# Patient Record
Sex: FEMALE | Race: WHITE | NOT HISPANIC OR LATINO | Employment: FULL TIME | URBAN - METROPOLITAN AREA
[De-identification: names, ages, dates, MRNs, and addresses within clinical notes are randomized per-mention and may not be internally consistent; named-entity substitution may affect disease eponyms.]

---

## 2017-01-13 ENCOUNTER — OFFICE VISIT (OUTPATIENT)
Dept: INTERNAL MEDICINE | Facility: CLINIC | Age: 57
End: 2017-01-13

## 2017-01-13 VITALS
HEIGHT: 65 IN | SYSTOLIC BLOOD PRESSURE: 128 MMHG | HEART RATE: 80 BPM | WEIGHT: 250 LBS | OXYGEN SATURATION: 98 % | DIASTOLIC BLOOD PRESSURE: 68 MMHG | BODY MASS INDEX: 41.65 KG/M2 | RESPIRATION RATE: 16 BRPM | TEMPERATURE: 97.7 F

## 2017-01-13 DIAGNOSIS — Z23 NEED FOR INFLUENZA VACCINATION: ICD-10-CM

## 2017-01-13 DIAGNOSIS — G47.33 OSA (OBSTRUCTIVE SLEEP APNEA): ICD-10-CM

## 2017-01-13 DIAGNOSIS — F32.A DEPRESSION, UNSPECIFIED DEPRESSION TYPE: ICD-10-CM

## 2017-01-13 DIAGNOSIS — Z13.6 SCREENING FOR CARDIOVASCULAR CONDITION: ICD-10-CM

## 2017-01-13 DIAGNOSIS — E78.5 HYPERLIPIDEMIA, UNSPECIFIED HYPERLIPIDEMIA TYPE: ICD-10-CM

## 2017-01-13 DIAGNOSIS — I10 ESSENTIAL HYPERTENSION: Primary | ICD-10-CM

## 2017-01-13 DIAGNOSIS — K76.0 FATTY LIVER DISEASE, NONALCOHOLIC: ICD-10-CM

## 2017-01-13 LAB
CHOLEST SERPL-MCNC: 140 MG/DL (ref 0–200)
HDLC SERPL-MCNC: 44 MG/DL (ref 40–60)
LDLC SERPL CALC-MCNC: 77 MG/DL (ref 0–100)
TRIGL SERPL-MCNC: 97 MG/DL (ref 0–150)
VLDLC SERPL CALC-MCNC: 19.4 MG/DL (ref 5–40)

## 2017-01-13 PROCEDURE — 90658 IIV3 VACCINE SPLT 0.5 ML IM: CPT | Performed by: INTERNAL MEDICINE

## 2017-01-13 PROCEDURE — 99213 OFFICE O/P EST LOW 20 MIN: CPT | Performed by: INTERNAL MEDICINE

## 2017-01-13 PROCEDURE — 90471 IMMUNIZATION ADMIN: CPT | Performed by: INTERNAL MEDICINE

## 2017-01-13 RX ORDER — EZETIMIBE 10 MG/1
10 TABLET ORAL DAILY
COMMUNITY
End: 2018-03-16 | Stop reason: SDUPTHER

## 2017-01-13 NOTE — MR AVS SNAPSHOT
Ann-Marie Eaton   1/13/2017 11:00 AM   Office Visit    Provider:  John Cowan MD   Department:  [facility] INTERNAL MEDICINE   Dept Phone:  [phone]                Your Full Care Plan              Today's Medication Changes          These changes are accurate as of: 1/13/17 11:47 AM.  If you have any questions, ask your nurse or doctor.               Stop taking medication(s)listed here:     dicyclomine 20 MG tablet   Commonly known as:  BENTYL   Stopped by:  John Cowan MD           gabapentin 300 MG capsule   Commonly known as:  NEURONTIN   Stopped by:  John Cowan MD                      Your Updated Medication List          This list is accurate as of: 1/13/17 11:47 AM.  Always use your most recent med list.                atorvastatin 20 MG tablet   Commonly known as:  LIPITOR   TAKE 1 TABLET BY MOUTH DAILY       CARTIA  MG 24 hr capsule   Generic drug:  diltiaZEM CD   TAKE 1 CAPSULE BY MOUTH DAILY       cetirizine 10 MG tablet   Commonly known as:  zyrTEC       lisinopril 10 MG tablet   Commonly known as:  PRINIVIL,ZESTRIL   TAKE 1 TABLET BY MOUTH EVERY DAY       loperamide 2 MG capsule   Commonly known as:  IMODIUM       melatonin 3 MG tablet       raNITIdine 150 MG tablet   Commonly known as:  ZANTAC       sertraline 100 MG tablet   Commonly known as:  ZOLOFT   TAKE 1&1/2 TABLETS BY MOUTH DAILY       valACYclovir 500 MG tablet   Commonly known as:  VALTREX   Take 1 tablet by mouth Daily.       vitamin D 08758 UNITS capsule capsule   Commonly known as:  ERGOCALCIFEROL   TAKE ONE CAPSULE BY MOUTH EVERY WEEK       Vitamin D3 16192 UNITS tablet       ZETIA 10 MG tablet   Generic drug:  ezetimibe               We Performed the Following     Flu Vaccine Greater Than or Equal To 2yo PF     Lipid Panel       You Were Diagnosed With        Codes Comments    Essential hypertension    -  Primary ICD-10-CM: I10  ICD-9-CM: 401.9     Hyperlipidemia, unspecified  hyperlipidemia type     ICD-10-CM: E78.5  ICD-9-CM: 272.4     Fatty liver disease, nonalcoholic     ICD-10-CM: K76.0  ICD-9-CM: 571.8     SHANNAN (obstructive sleep apnea)     ICD-10-CM: G47.33  ICD-9-CM: 327.23     Depression, unspecified depression type     ICD-10-CM: F32.9  ICD-9-CM: 311     Screening for cardiovascular condition     ICD-10-CM: Z13.6  ICD-9-CM: V81.2     Need for influenza vaccination     ICD-10-CM: Z23  ICD-9-CM: V04.81       Instructions     None    Patient Instructions History      [employer] Signup     [facility] [employer]Panel allows you to send messages to your doctor, view your test results, renew your prescriptions, schedule appointments, and more. To sign up, go to [employer] and click on the Sign Up Now link in the New User? box. Enter your MobPanel Activation Code exactly as it appears below along with the last four digits of your Social Security Number and your Date of Birth () to complete the sign-up process. If you do not sign up before the expiration date, you must request a new code.    MobPanel Activation Code: [email or web address]  Expires: 2017 11:46 AM    If you have questions, you can email [email or web address] or call [phone] to talk to our MobPanel staff. Remember, MobPanel is NOT to be used for urgent needs. For medical emergencies, dial 911.               Other Info from Your Visit           Your Appointments     2017  1:10 PM EDT   Follow Up with John Cowan MD   [facility] MEDICAL [facility] INTERNAL MEDICINE (--)    [address] [place] [id number] [phone]           Arrive 15 minutes prior to appointment.              Allergies     Darvon [Propoxyphene]      Other      Animal dander    Pollen Extract      Strawberry Extract  Hives    Sulfa Antibiotics      Penicillins  Rash      Reason for Visit     Hyperlipidemia IBS, GERD    Hypertension           Vital Signs     Blood Pressure Pulse Temperature Respirations Height  "Weight    128/68 (BP Location: Left arm, Patient Position: Sitting, Cuff Size: Large Adult) 80 97.7 °F (36.5 °C) (Oral) 16 65\" (165.1 cm) 250 lb (113 kg)    Oxygen Saturation Body Mass Index Smoking Status             98% 41.6 kg/m2 Former Smoker         Problems and Diagnoses Noted     Depression    Fatty liver disease, nonalcoholic    High cholesterol or triglycerides    High blood pressure    SHANNAN (obstructive sleep apnea)    Screening for cardiovascular condition        Needs flu shot          Immunizations Administered     Name Date    Influenza (IM) Preservative Free       "

## 2017-01-13 NOTE — PROGRESS NOTES
Subjective   Ann-Marie Eaton is a 56 y.o. female.     Chief Complaint   Patient presents with   • Hyperlipidemia     IBS, GERD   • Hypertension         Hyperlipidemia   This is a chronic problem. The current episode started more than 1 year ago. The problem is controlled. Recent lipid tests were reviewed and are normal. Pertinent negatives include no chest pain or shortness of breath. Current antihyperlipidemic treatment includes statins. The current treatment provides significant improvement of lipids. There are no compliance problems.  Risk factors for coronary artery disease include dyslipidemia, obesity, stress, post-menopausal and a sedentary lifestyle.   Hypertension   This is a chronic problem. The current episode started more than 1 year ago. The problem is unchanged. The problem is controlled. Associated symptoms include peripheral edema. Pertinent negatives include no chest pain, headaches, orthopnea, palpitations or shortness of breath. There are no associated agents to hypertension. Risk factors for coronary artery disease include dyslipidemia, post-menopausal state, stress, sedentary lifestyle and smoking/tobacco exposure. Past treatments include ACE inhibitors and calcium channel blockers. The current treatment provides significant improvement. There are no compliance problems.  There is no history of angina, kidney disease, CAD/MI, CVA or heart failure.        The following portions of the patient's history were reviewed and updated as appropriate: allergies, current medications, past social history and problem list.    Outpatient Prescriptions Marked as Taking for the 1/13/17 encounter (Office Visit) with John Cowan MD   Medication Sig Dispense Refill   • atorvastatin (LIPITOR) 20 MG tablet TAKE 1 TABLET BY MOUTH DAILY 90 tablet 0   • CARTIA  MG 24 hr capsule TAKE 1 CAPSULE BY MOUTH DAILY 90 capsule 0   • cetirizine (ZyrTEC) 10 MG tablet Take 10 mg by mouth daily.     • Cholecalciferol  (VITAMIN D3) 82247 UNITS tablet Take 50,000 Units by mouth 1 (one) time per week.     • ezetimibe (ZETIA) 10 MG tablet Take 10 mg by mouth Daily.     • lisinopril (PRINIVIL,ZESTRIL) 10 MG tablet TAKE 1 TABLET BY MOUTH EVERY DAY 90 tablet 0   • loperamide (IMODIUM) 2 MG capsule Take 2 mg by mouth as needed for diarrhea.     • melatonin tablet Take 3 mg by mouth At Night As Needed.     • ranitidine (ZANTAC) 150 MG tablet Take 150 mg by mouth 2 (two) times a day.     • sertraline (ZOLOFT) 100 MG tablet TAKE 1&1/2 TABLETS BY MOUTH DAILY 135 tablet 0   • valACYclovir (VALTREX) 500 MG tablet Take 1 tablet by mouth Daily. 90 tablet 3   • vitamin D (ERGOCALCIFEROL) 09947 UNITS capsule capsule TAKE ONE CAPSULE BY MOUTH EVERY WEEK 13 capsule 0       Review of Systems   Constitutional: Negative for chills, fatigue and fever.   Respiratory: Negative for cough, shortness of breath and wheezing.    Cardiovascular: Negative for chest pain, palpitations, orthopnea and leg swelling.   Gastrointestinal: Positive for diarrhea. Negative for abdominal pain, constipation, nausea and vomiting.   Neurological: Negative for headaches.       Objective   Vitals:    01/13/17 1045   BP: 128/68   Pulse: 80   Resp: 16   Temp: 97.7 °F (36.5 °C)   SpO2: 98%      Last Weight    01/13/17  1045   Weight: 250 lb (113 kg)    [unfilled]  Body mass index is 41.6 kg/(m^2).      Physical Exam   Constitutional: She appears well-developed and well-nourished. No distress.   HENT:   Head: Normocephalic and atraumatic.   Neck: Carotid bruit is not present. No thyromegaly present.   Cardiovascular: Normal rate, regular rhythm, normal heart sounds and intact distal pulses.  Exam reveals no gallop.    No murmur heard.  Pulmonary/Chest: Effort normal and breath sounds normal. No respiratory distress. She has no wheezes. She has no rales.   Abdominal: Soft. Bowel sounds are normal. She exhibits no mass. There is no tenderness. There is no guarding.          Problem List Items Addressed This Visit        Cardiovascular and Mediastinum    Hypertension - Primary    Hyperlipidemia    Relevant Medications    ezetimibe (ZETIA) 10 MG tablet    Other Relevant Orders    Lipid Panel       Respiratory    SHANNAN (obstructive sleep apnea)       Digestive    Fatty liver disease, nonalcoholic       Other    Depression      Other Visit Diagnoses     Screening for cardiovascular condition        Relevant Orders    Vascular Screening (Carotid) CAR        Assessment/Plan   In for recheck of htn, lipids, IBS, and Gerd.  Overall doing well.  Annual labs 4/16.  Lipids and hepatic q 3 mos.  Sertraline worked well at 150 mg daily.  Not seeing therapist at present and she does not feel the need.  Father is terminally ill which is currently the biggest stress in her life.  Father lives in [place].  Due for lipids today.  Flu shot updated today.  She had some x-rays done a dental office and some vascular calcifications were seen in her neck near the right carotid artery.  Further evaluation was suggested.  We'll get an on demand carotid ultrasound.         Dragon disclaimer:   Much of this encounter note is an electronic transcription/translation of spoken language to printed text. The electronic translation of spoken language may permit erroneous, or at times, nonsensical words or phrases to be inadvertently transcribed; Although I have reviewed the note for such errors, some may still exist.

## 2017-02-23 RX ORDER — ERGOCALCIFEROL 1.25 MG/1
CAPSULE ORAL
Qty: 13 CAPSULE | Refills: 0 | Status: SHIPPED | OUTPATIENT
Start: 2017-02-23 | End: 2017-04-18

## 2017-02-23 RX ORDER — SERTRALINE HYDROCHLORIDE 100 MG/1
TABLET, FILM COATED ORAL
Qty: 135 TABLET | Refills: 0 | Status: SHIPPED | OUTPATIENT
Start: 2017-02-23 | End: 2017-05-26 | Stop reason: SDUPTHER

## 2017-02-23 RX ORDER — LISINOPRIL 10 MG/1
TABLET ORAL
Qty: 90 TABLET | Refills: 0 | Status: SHIPPED | OUTPATIENT
Start: 2017-02-23 | End: 2017-05-26 | Stop reason: SDUPTHER

## 2017-02-23 RX ORDER — ATORVASTATIN CALCIUM 20 MG/1
TABLET, FILM COATED ORAL
Qty: 90 TABLET | Refills: 0 | Status: SHIPPED | OUTPATIENT
Start: 2017-02-23 | End: 2017-05-26 | Stop reason: SDUPTHER

## 2017-02-23 RX ORDER — DILTIAZEM HYDROCHLORIDE 240 MG/1
CAPSULE, EXTENDED RELEASE ORAL
Qty: 90 CAPSULE | Refills: 0 | Status: SHIPPED | OUTPATIENT
Start: 2017-02-23 | End: 2017-05-26 | Stop reason: SDUPTHER

## 2017-04-18 ENCOUNTER — OFFICE VISIT (OUTPATIENT)
Dept: INTERNAL MEDICINE | Facility: CLINIC | Age: 57
End: 2017-04-18

## 2017-04-18 VITALS
DIASTOLIC BLOOD PRESSURE: 76 MMHG | WEIGHT: 247.6 LBS | TEMPERATURE: 98.2 F | HEART RATE: 68 BPM | RESPIRATION RATE: 16 BRPM | SYSTOLIC BLOOD PRESSURE: 128 MMHG | HEIGHT: 65 IN | BODY MASS INDEX: 41.25 KG/M2 | OXYGEN SATURATION: 97 %

## 2017-04-18 DIAGNOSIS — E78.5 HYPERLIPIDEMIA, UNSPECIFIED HYPERLIPIDEMIA TYPE: ICD-10-CM

## 2017-04-18 DIAGNOSIS — I10 ESSENTIAL HYPERTENSION: Primary | ICD-10-CM

## 2017-04-18 DIAGNOSIS — F32.A DEPRESSION, UNSPECIFIED DEPRESSION TYPE: ICD-10-CM

## 2017-04-18 DIAGNOSIS — Z11.59 NEED FOR HEPATITIS C SCREENING TEST: ICD-10-CM

## 2017-04-18 DIAGNOSIS — K58.0 IRRITABLE BOWEL SYNDROME WITH DIARRHEA: ICD-10-CM

## 2017-04-18 DIAGNOSIS — K21.9 GASTROESOPHAGEAL REFLUX DISEASE, ESOPHAGITIS PRESENCE NOT SPECIFIED: ICD-10-CM

## 2017-04-18 DIAGNOSIS — Z79.899 MEDICATION MANAGEMENT: ICD-10-CM

## 2017-04-18 PROCEDURE — 99214 OFFICE O/P EST MOD 30 MIN: CPT | Performed by: INTERNAL MEDICINE

## 2017-04-18 RX ORDER — DICYCLOMINE HYDROCHLORIDE 10 MG/1
10 CAPSULE ORAL
Qty: 120 CAPSULE | Refills: 2 | Status: SHIPPED | OUTPATIENT
Start: 2017-04-18 | End: 2017-08-07

## 2017-04-18 NOTE — PROGRESS NOTES
Subjective   Ann-Marie Eaton is a 57 y.o. female.     Chief Complaint   Patient presents with   • Hyperlipidemia   • Hypertension         Hyperlipidemia   This is a chronic problem. The current episode started more than 1 year ago. The problem is controlled. Recent lipid tests were reviewed and are normal. Pertinent negatives include no chest pain or shortness of breath. Current antihyperlipidemic treatment includes statins. The current treatment provides significant improvement of lipids. There are no compliance problems.  Risk factors for coronary artery disease include dyslipidemia, obesity, stress, post-menopausal and a sedentary lifestyle.   Hypertension   This is a chronic problem. The current episode started more than 1 year ago. The problem is unchanged. The problem is controlled. Associated symptoms include peripheral edema. Pertinent negatives include no chest pain, headaches, orthopnea, palpitations or shortness of breath. There are no associated agents to hypertension. Risk factors for coronary artery disease include dyslipidemia, post-menopausal state, stress, sedentary lifestyle and smoking/tobacco exposure. Past treatments include ACE inhibitors and calcium channel blockers. The current treatment provides significant improvement. There are no compliance problems.  There is no history of angina, kidney disease, CAD/MI, CVA or heart failure.   Heartburn   She reports no abdominal pain, no chest pain, no coughing, no nausea or no wheezing. This is a chronic problem. The current episode started more than 1 year ago. The problem occurs occasionally. The problem has been unchanged. Pertinent negatives include no fatigue. She has tried a histamine-2 antagonist for the symptoms. The treatment provided significant relief.        The following portions of the patient's history were reviewed and updated as appropriate: allergies, current medications, past social history and problem list.    Outpatient Prescriptions  Marked as Taking for the 4/18/17 encounter (Office Visit) with John Cowan MD   Medication Sig Dispense Refill   • atorvastatin (LIPITOR) 20 MG tablet TAKE 1 TABLET BY MOUTH DAILY 90 tablet 0   • CARTIA  MG 24 hr capsule TAKE 1 CAPSULE BY MOUTH DAILY 90 capsule 0   • cetirizine (ZyrTEC) 10 MG tablet Take 10 mg by mouth daily.     • Cholecalciferol (VITAMIN D3) 72546 UNITS tablet Take 50,000 Units by mouth 1 (one) time per week.     • ezetimibe (ZETIA) 10 MG tablet Take 10 mg by mouth Daily.     • lisinopril (PRINIVIL,ZESTRIL) 10 MG tablet TAKE 1 TABLET BY MOUTH EVERY DAY 90 tablet 0   • loperamide (IMODIUM) 2 MG capsule Take 2 mg by mouth as needed for diarrhea.     • ranitidine (ZANTAC) 150 MG tablet Take 150 mg by mouth 2 (two) times a day.     • sertraline (ZOLOFT) 100 MG tablet TAKE 1&1/2 TABLETS BY MOUTH DAILY 135 tablet 0   • valACYclovir (VALTREX) 500 MG tablet Take 1 tablet by mouth Daily. 90 tablet 3   • [DISCONTINUED] melatonin tablet Take 3 mg by mouth At Night As Needed.         Review of Systems   Constitutional: Negative for chills, fatigue and fever.   Respiratory: Negative for cough, shortness of breath and wheezing.    Cardiovascular: Negative for chest pain, palpitations, orthopnea and leg swelling.   Gastrointestinal: Positive for diarrhea. Negative for abdominal pain, constipation, nausea and vomiting.   Neurological: Negative for headaches.       Objective   Vitals:    04/18/17 1300   BP: 128/76   Pulse: 68   Resp: 16   Temp: 98.2 °F (36.8 °C)   SpO2: 97%      Last Weight    04/18/17  1300   Weight: 247 lb 9.6 oz (112 kg)    [unfilled]  Body mass index is 41.2 kg/(m^2).      Physical Exam   Constitutional: She appears well-developed and well-nourished. No distress.   HENT:   Head: Normocephalic and atraumatic.   Neck: Carotid bruit is not present. No thyromegaly present.   Cardiovascular: Normal rate, regular rhythm, normal heart sounds and intact distal pulses.  Exam reveals no  gallop.    No murmur heard.  Pulmonary/Chest: Effort normal and breath sounds normal. No respiratory distress. She has no wheezes. She has no rales.   Abdominal: Soft. Bowel sounds are normal. She exhibits no mass. There is no tenderness. There is no guarding.         Problem List Items Addressed This Visit        Cardiovascular and Mediastinum    Hypertension - Primary    Hyperlipidemia    Relevant Orders    Lipid Panel       Digestive    IBS (irritable bowel syndrome)    GERD (gastroesophageal reflux disease)       Other    Depression      Other Visit Diagnoses     Medication management        Relevant Orders    CBC & Differential    Comprehensive Metabolic Panel    Need for hepatitis C screening test        Relevant Orders    Hepatitis C Antibody        Assessment/Plan   In for recheck of htn, lipids, IBS, and Gerd.  Overall doing well.  Diarrhea is worse and has diarrhea predominant irritable bowel.  Takes Imodium when necessary.  May need to start back on dicyclomine.  For now she'll start on a daily probiotic.  Annual labs today 4/17.  Lipids and hepatic q 3 mos.  Sertraline worked well at 150 mg daily.  Not seeing therapist at present and she does not feel the need.  Father has passed away since last visit.  She had some x-rays done a dental office and some vascular calcifications were seen in her neck near the right carotid artery.  Further evaluation was suggested.  We'll get an on demand carotid ultrasound.  Also due for Pap smear and mammogram.         Dragon disclaimer:   Much of this encounter note is an electronic transcription/translation of spoken language to printed text. The electronic translation of spoken language may permit erroneous, or at times, nonsensical words or phrases to be inadvertently transcribed; Although I have reviewed the note for such errors, some may still exist.

## 2017-04-19 ENCOUNTER — RESULTS ENCOUNTER (OUTPATIENT)
Dept: INTERNAL MEDICINE | Facility: CLINIC | Age: 57
End: 2017-04-19

## 2017-04-19 DIAGNOSIS — E78.5 HYPERLIPIDEMIA, UNSPECIFIED HYPERLIPIDEMIA TYPE: ICD-10-CM

## 2017-04-19 DIAGNOSIS — Z79.899 MEDICATION MANAGEMENT: ICD-10-CM

## 2017-04-19 DIAGNOSIS — Z11.59 NEED FOR HEPATITIS C SCREENING TEST: ICD-10-CM

## 2017-05-26 RX ORDER — LISINOPRIL 10 MG/1
TABLET ORAL
Qty: 90 TABLET | Refills: 0 | Status: SHIPPED | OUTPATIENT
Start: 2017-05-26 | End: 2017-08-18 | Stop reason: SDUPTHER

## 2017-05-26 RX ORDER — SERTRALINE HYDROCHLORIDE 100 MG/1
TABLET, FILM COATED ORAL
Qty: 135 TABLET | Refills: 0 | Status: SHIPPED | OUTPATIENT
Start: 2017-05-26 | End: 2017-08-18 | Stop reason: SDUPTHER

## 2017-05-26 RX ORDER — ATORVASTATIN CALCIUM 20 MG/1
TABLET, FILM COATED ORAL
Qty: 90 TABLET | Refills: 0 | Status: SHIPPED | OUTPATIENT
Start: 2017-05-26 | End: 2017-08-18 | Stop reason: SDUPTHER

## 2017-05-26 RX ORDER — ERGOCALCIFEROL 1.25 MG/1
CAPSULE ORAL
Qty: 13 CAPSULE | Refills: 0 | Status: SHIPPED | OUTPATIENT
Start: 2017-05-26 | End: 2017-08-18 | Stop reason: SDUPTHER

## 2017-05-26 RX ORDER — DILTIAZEM HYDROCHLORIDE 240 MG/1
CAPSULE, EXTENDED RELEASE ORAL
Qty: 90 CAPSULE | Refills: 0 | Status: SHIPPED | OUTPATIENT
Start: 2017-05-26 | End: 2017-08-18 | Stop reason: SDUPTHER

## 2017-08-07 ENCOUNTER — OFFICE VISIT (OUTPATIENT)
Dept: INTERNAL MEDICINE | Facility: CLINIC | Age: 57
End: 2017-08-07

## 2017-08-07 VITALS
SYSTOLIC BLOOD PRESSURE: 122 MMHG | HEART RATE: 63 BPM | OXYGEN SATURATION: 96 % | TEMPERATURE: 98.3 F | HEIGHT: 65 IN | WEIGHT: 247.4 LBS | RESPIRATION RATE: 16 BRPM | DIASTOLIC BLOOD PRESSURE: 74 MMHG | BODY MASS INDEX: 41.22 KG/M2

## 2017-08-07 DIAGNOSIS — I10 ESSENTIAL HYPERTENSION: Primary | ICD-10-CM

## 2017-08-07 DIAGNOSIS — E78.5 HYPERLIPIDEMIA, UNSPECIFIED HYPERLIPIDEMIA TYPE: ICD-10-CM

## 2017-08-07 DIAGNOSIS — Z79.899 MEDICATION MANAGEMENT: ICD-10-CM

## 2017-08-07 DIAGNOSIS — Z11.59 NEED FOR HEPATITIS C SCREENING TEST: ICD-10-CM

## 2017-08-07 DIAGNOSIS — G47.33 OSA (OBSTRUCTIVE SLEEP APNEA): ICD-10-CM

## 2017-08-07 DIAGNOSIS — F41.1 ANXIETY, GENERALIZED: ICD-10-CM

## 2017-08-07 DIAGNOSIS — K21.9 GASTROESOPHAGEAL REFLUX DISEASE, ESOPHAGITIS PRESENCE NOT SPECIFIED: ICD-10-CM

## 2017-08-07 LAB
ALBUMIN SERPL-MCNC: 4.2 G/DL (ref 3.5–5.2)
ALP SERPL-CCNC: 115 U/L (ref 39–117)
ALT SERPL-CCNC: 29 U/L (ref 1–33)
AST SERPL-CCNC: 23 U/L (ref 1–32)
BILIRUB DIRECT SERPL-MCNC: <0.2 MG/DL (ref 0–0.3)
BILIRUB SERPL-MCNC: 0.7 MG/DL (ref 0.1–1.2)
CHOLEST SERPL-MCNC: 153 MG/DL (ref 0–200)
HDLC SERPL-MCNC: 49 MG/DL (ref 40–60)
LDLC SERPL CALC-MCNC: 83 MG/DL (ref 0–100)
TRIGL SERPL-MCNC: 106 MG/DL (ref 0–150)
VLDLC SERPL CALC-MCNC: 21.2 MG/DL (ref 5–40)

## 2017-08-07 PROCEDURE — 99214 OFFICE O/P EST MOD 30 MIN: CPT | Performed by: INTERNAL MEDICINE

## 2017-08-07 NOTE — PROGRESS NOTES
Subjective   Ann-Marie Eaton is a 57 y.o. female.     Chief Complaint   Patient presents with   • Hyperlipidemia   • Hypertension         Hyperlipidemia   This is a chronic problem. The current episode started more than 1 year ago. The problem is controlled. Recent lipid tests were reviewed and are normal. Pertinent negatives include no chest pain or shortness of breath. Current antihyperlipidemic treatment includes statins. The current treatment provides significant improvement of lipids. There are no compliance problems.  Risk factors for coronary artery disease include dyslipidemia, obesity, stress, post-menopausal and a sedentary lifestyle.   Hypertension   This is a chronic problem. The current episode started more than 1 year ago. The problem is unchanged. The problem is controlled. Associated symptoms include peripheral edema. Pertinent negatives include no chest pain, headaches, orthopnea, palpitations or shortness of breath. There are no associated agents to hypertension. Risk factors for coronary artery disease include dyslipidemia, post-menopausal state, stress, sedentary lifestyle and smoking/tobacco exposure. Past treatments include ACE inhibitors and calcium channel blockers. The current treatment provides significant improvement. There are no compliance problems.  There is no history of angina, kidney disease, CAD/MI, CVA or heart failure.   Heartburn   She reports no abdominal pain, no chest pain, no coughing, no nausea or no wheezing. This is a chronic problem. The current episode started more than 1 year ago. The problem occurs occasionally. The problem has been unchanged. Pertinent negatives include no fatigue. She has tried a histamine-2 antagonist for the symptoms. The treatment provided significant relief.        The following portions of the patient's history were reviewed and updated as appropriate: allergies, current medications, past social history and problem list.    Outpatient Prescriptions  Marked as Taking for the 8/7/17 encounter (Office Visit) with John Cowan MD   Medication Sig Dispense Refill   • atorvastatin (LIPITOR) 20 MG tablet TAKE 1 TABLET BY MOUTH DAILY 90 tablet 0   • CARTIA  MG 24 hr capsule TAKE 1 CAPSULE BY MOUTH DAILY 90 capsule 0   • cetirizine (ZyrTEC) 10 MG tablet Take 10 mg by mouth daily.     • ezetimibe (ZETIA) 10 MG tablet Take 10 mg by mouth Daily.     • lisinopril (PRINIVIL,ZESTRIL) 10 MG tablet TAKE 1 TABLET BY MOUTH EVERY DAY 90 tablet 0   • loperamide (IMODIUM) 2 MG capsule Take 2 mg by mouth as needed for diarrhea.     • ranitidine (ZANTAC) 150 MG tablet Take 150 mg by mouth 2 (two) times a day.     • sertraline (ZOLOFT) 100 MG tablet TAKE 1&1/2 TABLETS BY MOUTH DAILY 135 tablet 0   • vitamin D (ERGOCALCIFEROL) 49085 UNITS capsule capsule TAKE ONE CAPSULE BY MOUTH EVERY WEEK 13 capsule 0   • [DISCONTINUED] Cholecalciferol (VITAMIN D3) 47984 UNITS tablet Take 50,000 Units by mouth 1 (one) time per week.     • [DISCONTINUED] dicyclomine (BENTYL) 10 MG capsule Take 1 capsule by mouth 4 (Four) Times a Day Before Meals & at Bedtime. 120 capsule 2       Review of Systems   Constitutional: Negative for chills, fatigue and fever.   Respiratory: Negative for cough, shortness of breath and wheezing.    Cardiovascular: Negative for chest pain, palpitations, orthopnea and leg swelling.   Gastrointestinal: Positive for diarrhea. Negative for abdominal pain, constipation, nausea and vomiting.   Neurological: Negative for headaches.       Objective   Vitals:    08/07/17 0850   BP: 122/74   Pulse: 63   Resp: 16   Temp: 98.3 °F (36.8 °C)   SpO2: 96%      Last Weight    08/07/17  0850   Weight: 247 lb 6.4 oz (112 kg)    [unfilled]  Body mass index is 41.17 kg/(m^2).      Physical Exam   Constitutional: She appears well-developed and well-nourished. No distress.   HENT:   Head: Normocephalic and atraumatic.   Neck: Carotid bruit is not present. No thyromegaly present.    Cardiovascular: Normal rate, regular rhythm, normal heart sounds and intact distal pulses.  Exam reveals no gallop.    No murmur heard.  Pulmonary/Chest: Effort normal and breath sounds normal. No respiratory distress. She has no wheezes. She has no rales.   Abdominal: Soft. Bowel sounds are normal. She exhibits no mass. There is no tenderness. There is no guarding.         Problem List Items Addressed This Visit        Cardiovascular and Mediastinum    Hypertension - Primary    Hyperlipidemia       Respiratory    SHANNAN (obstructive sleep apnea)       Digestive    GERD (gastroesophageal reflux disease)       Other    Anxiety, generalized        Assessment/Plan   In for recheck of htn, lipids, IBS, and Gerd.  Overall doing well.  Has diarrhea predominant irritable bowel which is doing pretty well overall.  Takes Imodium when necessary.  May need to start back on dicyclomine.  For now she'll start on a daily probiotic.  Annual labs 4/17.  Lipids and hepatic q 3 mos.  Sertraline worked well at 150 mg daily.  She boosted it to 200 mg per day with recent deaths of father and brother.  Also due for Pap smear and mammogram.         Dragon disclaimer:   Much of this encounter note is an electronic transcription/translation of spoken language to printed text. The electronic translation of spoken language may permit erroneous, or at times, nonsensical words or phrases to be inadvertently transcribed; Although I have reviewed the note for such errors, some may still exist.

## 2017-08-08 LAB — HCV AB S/CO SERPL IA: <0.1 S/CO RATIO (ref 0–0.9)

## 2017-08-18 RX ORDER — SERTRALINE HYDROCHLORIDE 100 MG/1
TABLET, FILM COATED ORAL
Qty: 135 TABLET | Refills: 0 | Status: SHIPPED | OUTPATIENT
Start: 2017-08-18 | End: 2017-11-16 | Stop reason: SDUPTHER

## 2017-08-18 RX ORDER — LISINOPRIL 10 MG/1
TABLET ORAL
Qty: 90 TABLET | Refills: 0 | Status: SHIPPED | OUTPATIENT
Start: 2017-08-18 | End: 2017-11-16 | Stop reason: SDUPTHER

## 2017-08-18 RX ORDER — DILTIAZEM HYDROCHLORIDE 240 MG/1
CAPSULE, EXTENDED RELEASE ORAL
Qty: 90 CAPSULE | Refills: 0 | Status: SHIPPED | OUTPATIENT
Start: 2017-08-18 | End: 2017-11-16 | Stop reason: SDUPTHER

## 2017-08-18 RX ORDER — ERGOCALCIFEROL 1.25 MG/1
CAPSULE ORAL
Qty: 13 CAPSULE | Refills: 0 | Status: SHIPPED | OUTPATIENT
Start: 2017-08-18 | End: 2017-11-16 | Stop reason: SDUPTHER

## 2017-08-18 RX ORDER — ATORVASTATIN CALCIUM 20 MG/1
TABLET, FILM COATED ORAL
Qty: 90 TABLET | Refills: 0 | Status: SHIPPED | OUTPATIENT
Start: 2017-08-18 | End: 2017-11-16 | Stop reason: SDUPTHER

## 2017-08-28 RX ORDER — VALACYCLOVIR HYDROCHLORIDE 500 MG/1
TABLET, FILM COATED ORAL
Qty: 90 TABLET | Refills: 0 | Status: SHIPPED | OUTPATIENT
Start: 2017-08-28 | End: 2017-11-18 | Stop reason: SDUPTHER

## 2017-11-06 ENCOUNTER — HOSPITAL ENCOUNTER (OUTPATIENT)
Dept: CT IMAGING | Facility: HOSPITAL | Age: 57
Discharge: HOME | End: 2017-11-06
Attending: INTERNAL MEDICINE | Admitting: INTERNAL MEDICINE

## 2017-11-06 ENCOUNTER — HOSPITAL ENCOUNTER (OUTPATIENT)
Dept: ULTRASOUND IMAGING | Facility: HOSPITAL | Age: 57
Discharge: HOME | End: 2017-11-06

## 2017-11-06 ENCOUNTER — OFFICE VISIT (OUTPATIENT)
Dept: INTERNAL MEDICINE | Facility: CLINIC | Age: 57
End: 2017-11-06

## 2017-11-06 ENCOUNTER — HOSPITAL ENCOUNTER (OUTPATIENT)
Dept: CT IMAGING | Facility: HOSPITAL | Age: 57
Discharge: HOME | End: 2017-11-06
Attending: INTERNAL MEDICINE

## 2017-11-06 VITALS
BODY MASS INDEX: 42.65 KG/M2 | OXYGEN SATURATION: 96 % | HEART RATE: 71 BPM | WEIGHT: 256 LBS | RESPIRATION RATE: 16 BRPM | HEIGHT: 65 IN | SYSTOLIC BLOOD PRESSURE: 164 MMHG | DIASTOLIC BLOOD PRESSURE: 84 MMHG | TEMPERATURE: 97.7 F

## 2017-11-06 DIAGNOSIS — E78.5 HYPERLIPIDEMIA, UNSPECIFIED HYPERLIPIDEMIA TYPE: ICD-10-CM

## 2017-11-06 DIAGNOSIS — N20.0 KIDNEY STONE: ICD-10-CM

## 2017-11-06 DIAGNOSIS — F32.A DEPRESSION, UNSPECIFIED DEPRESSION TYPE: ICD-10-CM

## 2017-11-06 DIAGNOSIS — N13.30 HYDRONEPHROSIS, RIGHT: Primary | ICD-10-CM

## 2017-11-06 DIAGNOSIS — N20.0 KIDNEY STONE: Primary | ICD-10-CM

## 2017-11-06 DIAGNOSIS — R10.9 ACUTE RIGHT FLANK PAIN: Primary | ICD-10-CM

## 2017-11-06 DIAGNOSIS — Z79.899 MEDICATION MANAGEMENT: ICD-10-CM

## 2017-11-06 DIAGNOSIS — K58.0 IRRITABLE BOWEL SYNDROME WITH DIARRHEA: ICD-10-CM

## 2017-11-06 DIAGNOSIS — N20.0 KIDNEY STONE ON RIGHT SIDE: ICD-10-CM

## 2017-11-06 DIAGNOSIS — I10 ESSENTIAL HYPERTENSION: ICD-10-CM

## 2017-11-06 LAB
ALBUMIN SERPL-MCNC: 4.2 G/DL (ref 3.5–5.2)
ALP SERPL-CCNC: 89 U/L (ref 39–117)
ALT SERPL-CCNC: 24 U/L (ref 1–33)
AST SERPL-CCNC: 25 U/L (ref 1–32)
BILIRUB DIRECT SERPL-MCNC: <0.2 MG/DL (ref 0–0.3)
BILIRUB SERPL-MCNC: 0.7 MG/DL (ref 0.1–1.2)
CHOLEST SERPL-MCNC: 128 MG/DL (ref 0–200)
HDLC SERPL-MCNC: 47 MG/DL (ref 40–60)
LDLC SERPL CALC-MCNC: 64 MG/DL (ref 0–100)
TRIGL SERPL-MCNC: 85 MG/DL (ref 0–150)
VLDLC SERPL CALC-MCNC: 17 MG/DL (ref 5–40)

## 2017-11-06 PROCEDURE — 74176 CT ABD & PELVIS W/O CONTRAST: CPT

## 2017-11-06 PROCEDURE — 99214 OFFICE O/P EST MOD 30 MIN: CPT | Performed by: INTERNAL MEDICINE

## 2017-11-06 PROCEDURE — 76775 US EXAM ABDO BACK WALL LIM: CPT

## 2017-11-06 NOTE — PROGRESS NOTES
Subjective   Ann-Marie Eaton is a 57 y.o. female.     Chief Complaint   Patient presents with   • Hypertension     IBS, DEPRESSION FOLLOW UP   • Hyperlipidemia   • Flank Pain     RIGHT FLANK PAIN SINCE THIS MORNING         Hypertension   This is a chronic problem. The current episode started more than 1 year ago. The problem is unchanged. The problem is controlled. Associated symptoms include peripheral edema. Pertinent negatives include no chest pain, headaches, orthopnea, palpitations or shortness of breath. There are no associated agents to hypertension. Risk factors for coronary artery disease include dyslipidemia, post-menopausal state, stress, sedentary lifestyle and smoking/tobacco exposure. Past treatments include ACE inhibitors and calcium channel blockers. The current treatment provides significant improvement. There are no compliance problems.  There is no history of angina, kidney disease, CAD/MI, CVA or heart failure.   Hyperlipidemia   This is a chronic problem. The current episode started more than 1 year ago. The problem is controlled. Recent lipid tests were reviewed and are normal. Pertinent negatives include no chest pain or shortness of breath. Current antihyperlipidemic treatment includes statins. The current treatment provides significant improvement of lipids. There are no compliance problems.  Risk factors for coronary artery disease include dyslipidemia, obesity, stress, post-menopausal and a sedentary lifestyle.   Flank Pain   This is a new problem. The current episode started today. The problem occurs constantly. The problem is unchanged. The pain is present in the lumbar spine. The quality of the pain is described as stabbing and shooting. The pain does not radiate. The pain is severe. Pertinent negatives include no abdominal pain, chest pain, fever or headaches.   Heartburn   She reports no abdominal pain, no chest pain, no coughing, no nausea or no wheezing. This is a chronic problem. The  current episode started more than 1 year ago. The problem occurs occasionally. The problem has been unchanged. Pertinent negatives include no fatigue. She has tried a histamine-2 antagonist for the symptoms. The treatment provided significant relief.        The following portions of the patient's history were reviewed and updated as appropriate: allergies, current medications, past social history and problem list.    Outpatient Prescriptions Marked as Taking for the 11/6/17 encounter (Office Visit) with John Cowan MD   Medication Sig Dispense Refill   • CARTIA  MG 24 hr capsule TAKE 1 CAPSULE BY MOUTH DAILY 90 capsule 0   • cetirizine (ZyrTEC) 10 MG tablet Take 10 mg by mouth daily.     • ezetimibe (ZETIA) 10 MG tablet Take 10 mg by mouth Daily.     • lisinopril (PRINIVIL,ZESTRIL) 10 MG tablet TAKE 1 TABLET BY MOUTH EVERY DAY 90 tablet 0   • loperamide (IMODIUM) 2 MG capsule Take 2 mg by mouth as needed for diarrhea.     • ranitidine (ZANTAC) 150 MG tablet Take 150 mg by mouth 2 (two) times a day.     • sertraline (ZOLOFT) 100 MG tablet TAKE 1&1/2 TABLETS BY MOUTH DAILY 135 tablet 0   • valACYclovir (VALTREX) 500 MG tablet TAKE 1 TABLET BY MOUTH DAILY 90 tablet 0   • vitamin D (ERGOCALCIFEROL) 95508 units capsule capsule TAKE ONE CAPSULE BY MOUTH EVERY WEEK 13 capsule 0       Review of Systems   Constitutional: Negative for chills, fatigue and fever.   Respiratory: Negative for cough, shortness of breath and wheezing.    Cardiovascular: Negative for chest pain, palpitations, orthopnea and leg swelling.   Gastrointestinal: Positive for diarrhea. Negative for abdominal pain, constipation, nausea and vomiting.   Genitourinary: Positive for flank pain.   Neurological: Negative for headaches.       Objective   Vitals:    11/06/17 1003   BP: 164/84   Pulse: 71   Resp: 16   Temp: 97.7 °F (36.5 °C)   SpO2: 96%      Last Weight    11/06/17  1003   Weight: 256 lb (116 kg)    [unfilled]  Body mass index is 42.6  kg/(m^2).      Physical Exam   Constitutional: She appears well-developed and well-nourished. No distress.   HENT:   Head: Normocephalic and atraumatic.   Neck: Carotid bruit is not present. No thyromegaly present.   Cardiovascular: Normal rate, regular rhythm, normal heart sounds and intact distal pulses.  Exam reveals no gallop.    No murmur heard.  Pulmonary/Chest: Effort normal and breath sounds normal. No respiratory distress. She has no wheezes. She has no rales.   Abdominal: Soft. Bowel sounds are normal. She exhibits no mass. There is no tenderness. There is no guarding.         Problem List Items Addressed This Visit        Cardiovascular and Mediastinum    Hypertension    Hyperlipidemia       Digestive    IBS (irritable bowel syndrome)       Genitourinary    Kidney stone       Other    Depression      Other Visit Diagnoses     Acute right flank pain    -  Primary    Relevant Orders    POC Urinalysis Dipstick, Automated        Assessment/Plan   In for recheck of htn, lipids, IBS, and Gerd.  Began about 1 hour ago with right flank pain.  Is very severe and intense.  No worse with motion.  She's had a total of 5 kidney stones in the past.  This reminds her of a kidney stone.  Has diarrhea predominant irritable bowel which is doing pretty well overall.  Takes Imodium when necessary.  For now she is off of probiotics.  Annual labs 4/17.  Lipids and hepatic q 3 mos.  Sertraline dose is back to 150 mg daily.  Depression is better now.         Dragon disclaimer:   Much of this encounter note is an electronic transcription/translation of spoken language to printed text. The electronic translation of spoken language may permit erroneous, or at times, nonsensical words or phrases to be inadvertently transcribed; Although I have reviewed the note for such errors, some may still exist.

## 2017-11-06 NOTE — NURSING NOTE
Dr. Cowan called and spoke with patient. Patient able to go home. Patient ambulated towards [facility].

## 2017-11-16 RX ORDER — ATORVASTATIN CALCIUM 20 MG/1
TABLET, FILM COATED ORAL
Qty: 90 TABLET | Refills: 0 | Status: SHIPPED | OUTPATIENT
Start: 2017-11-16 | End: 2018-03-16 | Stop reason: SDUPTHER

## 2017-11-16 RX ORDER — SERTRALINE HYDROCHLORIDE 100 MG/1
TABLET, FILM COATED ORAL
Qty: 135 TABLET | Refills: 0 | Status: SHIPPED | OUTPATIENT
Start: 2017-11-16 | End: 2018-03-16 | Stop reason: SDUPTHER

## 2017-11-16 RX ORDER — LISINOPRIL 10 MG/1
TABLET ORAL
Qty: 90 TABLET | Refills: 0 | Status: SHIPPED | OUTPATIENT
Start: 2017-11-16 | End: 2018-03-16 | Stop reason: SDUPTHER

## 2017-11-16 RX ORDER — DILTIAZEM HYDROCHLORIDE 240 MG/1
CAPSULE, EXTENDED RELEASE ORAL
Qty: 90 CAPSULE | Refills: 0 | Status: SHIPPED | OUTPATIENT
Start: 2017-11-16 | End: 2018-03-16 | Stop reason: SDUPTHER

## 2017-11-16 RX ORDER — ERGOCALCIFEROL 1.25 MG/1
CAPSULE ORAL
Qty: 13 CAPSULE | Refills: 0 | Status: SHIPPED | OUTPATIENT
Start: 2017-11-16 | End: 2018-03-16 | Stop reason: SDUPTHER

## 2017-11-20 RX ORDER — VALACYCLOVIR HYDROCHLORIDE 500 MG/1
TABLET, FILM COATED ORAL
Qty: 90 TABLET | Refills: 0 | Status: SHIPPED | OUTPATIENT
Start: 2017-11-20 | End: 2018-02-10 | Stop reason: SDUPTHER

## 2018-01-17 ENCOUNTER — OFFICE VISIT (OUTPATIENT)
Dept: INTERNAL MEDICINE | Facility: CLINIC | Age: 58
End: 2018-01-17

## 2018-01-17 VITALS
OXYGEN SATURATION: 96 % | TEMPERATURE: 98.1 F | WEIGHT: 249.2 LBS | HEART RATE: 78 BPM | DIASTOLIC BLOOD PRESSURE: 78 MMHG | RESPIRATION RATE: 16 BRPM | HEIGHT: 65 IN | BODY MASS INDEX: 41.52 KG/M2 | SYSTOLIC BLOOD PRESSURE: 128 MMHG

## 2018-01-17 DIAGNOSIS — J40 BRONCHITIS: Primary | ICD-10-CM

## 2018-01-17 PROCEDURE — 99213 OFFICE O/P EST LOW 20 MIN: CPT | Performed by: INTERNAL MEDICINE

## 2018-01-17 RX ORDER — DICYCLOMINE HYDROCHLORIDE 10 MG/1
10 CAPSULE ORAL AS NEEDED
COMMUNITY
End: 2019-08-08

## 2018-01-17 RX ORDER — AZITHROMYCIN 250 MG/1
TABLET, FILM COATED ORAL
Qty: 6 TABLET | Refills: 0 | Status: SHIPPED | OUTPATIENT
Start: 2018-01-17 | End: 2018-03-16

## 2018-01-17 NOTE — PROGRESS NOTES
Subjective   Ann-Marie Eaton is a 57 y.o. female.     Chief Complaint   Patient presents with   • Nasal Congestion     X 1 WK   • Cough         Cough   This is a new problem. The current episode started in the past 7 days. The problem has been unchanged. The cough is non-productive. Associated symptoms include rhinorrhea. Pertinent negatives include no chest pain, chills, fever, headaches, myalgias, nasal congestion, postnasal drip or shortness of breath. Nothing aggravates the symptoms. She has tried OTC cough suppressant for the symptoms. The treatment provided no relief.        The following portions of the patient's history were reviewed and updated as appropriate: allergies, current medications, past social history and problem list.    Outpatient Prescriptions Marked as Taking for the 1/17/18 encounter (Office Visit) with John Cowan MD   Medication Sig Dispense Refill   • atorvastatin (LIPITOR) 20 MG tablet TAKE 1 TABLET BY MOUTH DAILY 90 tablet 0   • CARTIA  MG 24 hr capsule TAKE 1 CAPSULE BY MOUTH DAILY 90 capsule 0   • cetirizine (ZyrTEC) 10 MG tablet Take 10 mg by mouth daily.     • dicyclomine (BENTYL) 10 MG capsule Take 10 mg by mouth As Needed.     • ezetimibe (ZETIA) 10 MG tablet Take 10 mg by mouth Daily.     • lisinopril (PRINIVIL,ZESTRIL) 10 MG tablet TAKE 1 TABLET BY MOUTH EVERY DAY 90 tablet 0   • loperamide (IMODIUM) 2 MG capsule Take 2 mg by mouth as needed for diarrhea.     • ranitidine (ZANTAC) 150 MG tablet Take 150 mg by mouth 2 (two) times a day.     • sertraline (ZOLOFT) 100 MG tablet TAKE 1&1/2 TABLETS BY MOUTH DAILY 135 tablet 0   • valACYclovir (VALTREX) 500 MG tablet TAKE 1 TABLET BY MOUTH DAILY 90 tablet 0   • vitamin D (ERGOCALCIFEROL) 93880 units capsule capsule TAKE ONE CAPSULE BY MOUTH EVERY WEEK 13 capsule 0       Review of Systems   Constitutional: Negative for chills and fever.   HENT: Positive for rhinorrhea. Negative for postnasal drip.    Respiratory: Positive for  cough. Negative for shortness of breath.    Cardiovascular: Negative for chest pain.   Musculoskeletal: Negative for myalgias.   Neurological: Negative for headaches.       Objective   Vitals:    01/17/18 0813   BP: 128/78   Pulse: 78   Resp: 16   Temp: 98.1 °F (36.7 °C)   SpO2: 96%      Last Weight    01/17/18 0813   Weight: 113 kg (249 lb 3.2 oz)    [unfilled]  Body mass index is 41.47 kg/(m^2).      Physical Exam   Constitutional: She appears well-developed and well-nourished.   HENT:   Head: Normocephalic and atraumatic.   Right Ear: External ear normal.   Left Ear: External ear normal.   Nose: Nose normal.   Mouth/Throat: Oropharynx is clear and moist.   Eyes: Conjunctivae are normal. Pupils are equal, round, and reactive to light.   Pulmonary/Chest: Effort normal and breath sounds normal. No respiratory distress. She has no wheezes. She has no rales.   Skin: Skin is warm and dry.         Problem List Items Addressed This Visit     None      Visit Diagnoses     Bronchitis    -  Primary        Assessment/Plan   With 1 week of head congestion.  Cough.  No sputum production.  Some ache in the maxillary sinus regions.  OTCs are not helping.  We went through options on treatment today.  She prefers to start Z-Paul which is reasonable.  She'll continue with OTCs.  She's using nasal spray and Mucinex.         Dragon disclaimer:   Much of this encounter note is an electronic transcription/translation of spoken language to printed text. The electronic translation of spoken language may permit erroneous, or at times, nonsensical words or phrases to be inadvertently transcribed; Although I have reviewed the note for such errors, some may still exist.

## 2018-02-09 RX ORDER — ERGOCALCIFEROL 1.25 MG/1
CAPSULE ORAL
Qty: 13 CAPSULE | Refills: 0 | OUTPATIENT
Start: 2018-02-09

## 2018-02-09 RX ORDER — SERTRALINE HYDROCHLORIDE 100 MG/1
TABLET, FILM COATED ORAL
Qty: 135 TABLET | Refills: 0 | OUTPATIENT
Start: 2018-02-09

## 2018-02-09 RX ORDER — LISINOPRIL 10 MG/1
TABLET ORAL
Qty: 90 TABLET | Refills: 0 | OUTPATIENT
Start: 2018-02-09

## 2018-02-09 RX ORDER — DILTIAZEM HYDROCHLORIDE 240 MG/1
CAPSULE, EXTENDED RELEASE ORAL
Qty: 90 CAPSULE | Refills: 0 | OUTPATIENT
Start: 2018-02-09

## 2018-02-09 RX ORDER — ATORVASTATIN CALCIUM 20 MG/1
TABLET, FILM COATED ORAL
Qty: 90 TABLET | Refills: 0 | OUTPATIENT
Start: 2018-02-09

## 2018-02-12 RX ORDER — VALACYCLOVIR HYDROCHLORIDE 500 MG/1
TABLET, FILM COATED ORAL
Qty: 90 TABLET | Refills: 0 | Status: SHIPPED | OUTPATIENT
Start: 2018-02-12 | End: 2018-03-16 | Stop reason: SDUPTHER

## 2018-03-16 ENCOUNTER — OFFICE VISIT (OUTPATIENT)
Dept: INTERNAL MEDICINE | Facility: CLINIC | Age: 58
End: 2018-03-16

## 2018-03-16 VITALS
SYSTOLIC BLOOD PRESSURE: 118 MMHG | OXYGEN SATURATION: 92 % | WEIGHT: 254.4 LBS | HEIGHT: 65 IN | BODY MASS INDEX: 42.38 KG/M2 | HEART RATE: 76 BPM | RESPIRATION RATE: 16 BRPM | TEMPERATURE: 97.8 F | DIASTOLIC BLOOD PRESSURE: 70 MMHG

## 2018-03-16 DIAGNOSIS — E78.5 HYPERLIPIDEMIA, UNSPECIFIED HYPERLIPIDEMIA TYPE: ICD-10-CM

## 2018-03-16 DIAGNOSIS — K58.0 IRRITABLE BOWEL SYNDROME WITH DIARRHEA: ICD-10-CM

## 2018-03-16 DIAGNOSIS — Z79.899 MEDICATION MANAGEMENT: ICD-10-CM

## 2018-03-16 DIAGNOSIS — K21.9 GASTROESOPHAGEAL REFLUX DISEASE, ESOPHAGITIS PRESENCE NOT SPECIFIED: ICD-10-CM

## 2018-03-16 DIAGNOSIS — F41.1 ANXIETY, GENERALIZED: ICD-10-CM

## 2018-03-16 DIAGNOSIS — I10 ESSENTIAL HYPERTENSION: Primary | ICD-10-CM

## 2018-03-16 LAB
ALBUMIN SERPL-MCNC: 4 G/DL (ref 3.5–5.2)
ALP SERPL-CCNC: 112 U/L (ref 39–117)
ALT SERPL-CCNC: 24 U/L (ref 1–33)
AST SERPL-CCNC: 23 U/L (ref 1–32)
BILIRUB DIRECT SERPL-MCNC: <0.2 MG/DL (ref 0–0.3)
BILIRUB SERPL-MCNC: 0.8 MG/DL (ref 0.1–1.2)
CHOLEST SERPL-MCNC: 152 MG/DL (ref 0–200)
HDLC SERPL-MCNC: 42 MG/DL (ref 40–60)
LDLC SERPL CALC-MCNC: 86 MG/DL (ref 0–100)
TRIGL SERPL-MCNC: 121 MG/DL (ref 0–150)
VLDLC SERPL CALC-MCNC: 24.2 MG/DL (ref 5–40)

## 2018-03-16 PROCEDURE — 99214 OFFICE O/P EST MOD 30 MIN: CPT | Performed by: INTERNAL MEDICINE

## 2018-03-16 RX ORDER — RANITIDINE 150 MG/1
150 TABLET ORAL NIGHTLY
Qty: 90 TABLET | Refills: 1 | Status: SHIPPED | OUTPATIENT
Start: 2018-03-16 | End: 2020-01-09 | Stop reason: RX

## 2018-03-16 RX ORDER — SERTRALINE HYDROCHLORIDE 100 MG/1
150 TABLET, FILM COATED ORAL DAILY
Qty: 135 TABLET | Refills: 1 | Status: SHIPPED | OUTPATIENT
Start: 2018-03-16 | End: 2018-09-01 | Stop reason: SDUPTHER

## 2018-03-16 RX ORDER — EZETIMIBE 10 MG/1
10 TABLET ORAL DAILY
Qty: 90 TABLET | Refills: 1 | Status: SHIPPED | OUTPATIENT
Start: 2018-03-16 | End: 2018-09-01 | Stop reason: SDUPTHER

## 2018-03-16 RX ORDER — ATORVASTATIN CALCIUM 20 MG/1
20 TABLET, FILM COATED ORAL DAILY
Qty: 90 TABLET | Refills: 1 | Status: SHIPPED | OUTPATIENT
Start: 2018-03-16 | End: 2018-09-01 | Stop reason: SDUPTHER

## 2018-03-16 RX ORDER — ERGOCALCIFEROL 1.25 MG/1
50000 CAPSULE ORAL WEEKLY
Qty: 13 CAPSULE | Refills: 1 | Status: SHIPPED | OUTPATIENT
Start: 2018-03-16 | End: 2018-09-01 | Stop reason: SDUPTHER

## 2018-03-16 RX ORDER — DILTIAZEM HYDROCHLORIDE 240 MG/1
240 CAPSULE, COATED, EXTENDED RELEASE ORAL DAILY
Qty: 90 CAPSULE | Refills: 1 | Status: SHIPPED | OUTPATIENT
Start: 2018-03-16 | End: 2018-09-01 | Stop reason: SDUPTHER

## 2018-03-16 RX ORDER — LISINOPRIL 10 MG/1
10 TABLET ORAL DAILY
Qty: 90 TABLET | Refills: 1 | Status: SHIPPED | OUTPATIENT
Start: 2018-03-16 | End: 2018-09-01 | Stop reason: SDUPTHER

## 2018-03-16 RX ORDER — VALACYCLOVIR HYDROCHLORIDE 500 MG/1
500 TABLET, FILM COATED ORAL DAILY
Qty: 90 TABLET | Refills: 1 | Status: SHIPPED | OUTPATIENT
Start: 2018-03-16 | End: 2018-12-16 | Stop reason: SDUPTHER

## 2018-03-16 NOTE — PROGRESS NOTES
Subjective   Ann-Marie Eaton is a 58 y.o. female.     Chief Complaint   Patient presents with   • Hypertension   • Hyperlipidemia   • Irritable Bowel Syndrome         Hypertension   This is a chronic problem. The current episode started more than 1 year ago. The problem is unchanged. The problem is controlled. Associated symptoms include peripheral edema. Pertinent negatives include no chest pain, headaches, orthopnea, palpitations or shortness of breath. There are no associated agents to hypertension. Risk factors for coronary artery disease include dyslipidemia, post-menopausal state, stress, sedentary lifestyle and smoking/tobacco exposure. Past treatments include ACE inhibitors and calcium channel blockers. The current treatment provides significant improvement. There are no compliance problems.  There is no history of angina, kidney disease, CAD/MI, CVA or heart failure. Current antihypertension treatment includes ACE inhibitors and calcium channel blockers.   Hyperlipidemia   This is a chronic problem. The current episode started more than 1 year ago. The problem is controlled. Recent lipid tests were reviewed and are normal. Pertinent negatives include no chest pain or shortness of breath. Current antihyperlipidemic treatment includes statins. The current treatment provides significant improvement of lipids. There are no compliance problems.  Risk factors for coronary artery disease include dyslipidemia, obesity, stress, post-menopausal and a sedentary lifestyle.   Irritable Bowel Syndrome   Pertinent negatives include no abdominal pain, chest pain, chills, coughing, fatigue, fever, headaches, nausea or vomiting.   Flank Pain   This is a new problem. The current episode started today. The problem occurs constantly. The problem is unchanged. The pain is present in the lumbar spine. The quality of the pain is described as stabbing and shooting. The pain does not radiate. The pain is severe. Pertinent negatives  include no abdominal pain, chest pain, fever or headaches.   Heartburn   She reports no abdominal pain, no chest pain, no coughing, no nausea or no wheezing. This is a chronic problem. The current episode started more than 1 year ago. The problem occurs occasionally. The problem has been unchanged. Pertinent negatives include no fatigue. She has tried a histamine-2 antagonist for the symptoms. The treatment provided significant relief.        The following portions of the patient's history were reviewed and updated as appropriate: allergies, current medications, past social history and problem list.    Outpatient Prescriptions Marked as Taking for the 3/16/18 encounter (Office Visit) with John Cowan MD   Medication Sig Dispense Refill   • atorvastatin (LIPITOR) 20 MG tablet TAKE 1 TABLET BY MOUTH DAILY 90 tablet 0   • CARTIA  MG 24 hr capsule TAKE 1 CAPSULE BY MOUTH DAILY 90 capsule 0   • cetirizine (ZyrTEC) 10 MG tablet Take 10 mg by mouth daily.     • dicyclomine (BENTYL) 10 MG capsule Take 10 mg by mouth As Needed.     • ezetimibe (ZETIA) 10 MG tablet Take 10 mg by mouth Daily.     • lisinopril (PRINIVIL,ZESTRIL) 10 MG tablet TAKE 1 TABLET BY MOUTH EVERY DAY 90 tablet 0   • loperamide (IMODIUM) 2 MG capsule Take 2 mg by mouth as needed for diarrhea.     • ranitidine (ZANTAC) 150 MG tablet Take 150 mg by mouth 2 (two) times a day.     • sertraline (ZOLOFT) 100 MG tablet TAKE 1&1/2 TABLETS BY MOUTH DAILY 135 tablet 0   • valACYclovir (VALTREX) 500 MG tablet TAKE 1 TABLET BY MOUTH DAILY 90 tablet 0   • vitamin D (ERGOCALCIFEROL) 65693 units capsule capsule TAKE ONE CAPSULE BY MOUTH EVERY WEEK 13 capsule 0       Review of Systems   Constitutional: Negative for chills, fatigue and fever.   Respiratory: Negative for cough, shortness of breath and wheezing.    Cardiovascular: Negative for chest pain, palpitations, orthopnea and leg swelling.   Gastrointestinal: Positive for diarrhea. Negative for abdominal  pain, constipation, nausea and vomiting.   Genitourinary: Positive for flank pain.   Neurological: Negative for headaches.       Objective   Vitals:    03/16/18 1032   BP: 118/70   Pulse: 76   Resp: 16   Temp: 97.8 °F (36.6 °C)   SpO2: 92%      1    03/16/18  1032   Weight: 115 kg (254 lb 6.4 oz)    [unfilled]  Body mass index is 42.33 kg/m².      Physical Exam   Constitutional: She appears well-developed and well-nourished. No distress.   HENT:   Head: Normocephalic and atraumatic.   Neck: Carotid bruit is not present. No thyromegaly present.   Cardiovascular: Normal rate, regular rhythm, normal heart sounds and intact distal pulses.  Exam reveals no gallop.    No murmur heard.  Pulmonary/Chest: Effort normal and breath sounds normal. No respiratory distress. She has no wheezes. She has no rales.   Abdominal: Soft. Bowel sounds are normal. She exhibits no mass. There is no tenderness. There is no guarding.         Problem List Items Addressed This Visit        Cardiovascular and Mediastinum    Hypertension - Primary    Hyperlipidemia       Digestive    IBS (irritable bowel syndrome)    GERD (gastroesophageal reflux disease)       Other    Anxiety, generalized      Other Visit Diagnoses    None.       Assessment/Plan   In for recheck of htn, lipids, IBS, and Gerd.  Has diarrhea predominant irritable bowel which is doing pretty well overall.  Takes Imodium when necessary.  For now she is off of probiotics.  Annual labs 6/18.  Lipids and hepatic q 3 mos.  Sertraline dose is back to 150 mg daily.  Depression is better now.  Due for Shingrix.         Dragon disclaimer:   Much of this encounter note is an electronic transcription/translation of spoken language to printed text. The electronic translation of spoken language may permit erroneous, or at times, nonsensical words or phrases to be inadvertently transcribed; Although I have reviewed the note for such errors, some may still exist.

## 2018-05-27 ENCOUNTER — HOSPITAL ENCOUNTER (INPATIENT)
Facility: HOSPITAL | Age: 58
LOS: 2 days | Discharge: HOME | End: 2018-05-29
Attending: EMERGENCY MEDICINE | Admitting: INTERNAL MEDICINE

## 2018-05-27 ENCOUNTER — APPOINTMENT (OUTPATIENT)
Dept: CT IMAGING | Facility: HOSPITAL | Age: 58
End: 2018-05-27

## 2018-05-27 DIAGNOSIS — K12.2 SUBMANDIBULAR SPACE INFECTION: Primary | ICD-10-CM

## 2018-05-27 DIAGNOSIS — J32.0 LEFT MAXILLARY SINUSITIS: ICD-10-CM

## 2018-05-27 PROBLEM — T85.79XA: Status: ACTIVE | Noted: 2018-05-27

## 2018-05-27 LAB
ALBUMIN SERPL-MCNC: 3.8 G/DL (ref 3.5–5.2)
ALBUMIN/GLOB SERPL: 1.2 G/DL
ALP SERPL-CCNC: 118 U/L (ref 39–117)
ALT SERPL W P-5'-P-CCNC: 16 U/L (ref 1–33)
ANION GAP SERPL CALCULATED.3IONS-SCNC: 9.7 MMOL/L
AST SERPL-CCNC: 12 U/L (ref 1–32)
BASOPHILS # BLD AUTO: 0.03 10*3/MM3 (ref 0–0.2)
BASOPHILS NFR BLD AUTO: 0.3 % (ref 0–1.5)
BILIRUB SERPL-MCNC: 0.8 MG/DL (ref 0.1–1.2)
BUN BLD-MCNC: 12 MG/DL (ref 8–23)
BUN/CREAT SERPL: 20 (ref 7–25)
CALCIUM SPEC-SCNC: 9.2 MG/DL (ref 8.6–10.5)
CHLORIDE SERPL-SCNC: 102 MMOL/L (ref 98–107)
CO2 SERPL-SCNC: 29.3 MMOL/L (ref 22–29)
CREAT BLD-MCNC: 0.6 MG/DL (ref 0.57–1)
DEPRECATED RDW RBC AUTO: 48.5 FL (ref 37–54)
EOSINOPHIL # BLD AUTO: 0.3 10*3/MM3 (ref 0–0.7)
EOSINOPHIL NFR BLD AUTO: 2.8 % (ref 0.3–6.2)
ERYTHROCYTE [DISTWIDTH] IN BLOOD BY AUTOMATED COUNT: 13.4 % (ref 11.7–13)
GFR SERPL CREATININE-BSD FRML MDRD: 102 ML/MIN/1.73
GLOBULIN UR ELPH-MCNC: 3.2 GM/DL
GLUCOSE BLD-MCNC: 105 MG/DL (ref 65–99)
HCT VFR BLD AUTO: 38.1 % (ref 35.6–45.5)
HGB BLD-MCNC: 12.5 G/DL (ref 11.9–15.5)
IMM GRANULOCYTES # BLD: 0.02 10*3/MM3 (ref 0–0.03)
IMM GRANULOCYTES NFR BLD: 0.2 % (ref 0–0.5)
LYMPHOCYTES # BLD AUTO: 1.23 10*3/MM3 (ref 0.9–4.8)
LYMPHOCYTES NFR BLD AUTO: 11.6 % (ref 19.6–45.3)
MCH RBC QN AUTO: 32.5 PG (ref 26.9–32)
MCHC RBC AUTO-ENTMCNC: 32.8 G/DL (ref 32.4–36.3)
MCV RBC AUTO: 99 FL (ref 80.5–98.2)
MONOCYTES # BLD AUTO: 1.16 10*3/MM3 (ref 0.2–1.2)
MONOCYTES NFR BLD AUTO: 11 % (ref 5–12)
NEUTROPHILS # BLD AUTO: 7.84 10*3/MM3 (ref 1.9–8.1)
NEUTROPHILS NFR BLD AUTO: 74.3 % (ref 42.7–76)
PLATELET # BLD AUTO: 271 10*3/MM3 (ref 140–500)
PMV BLD AUTO: 9.9 FL (ref 6–12)
POTASSIUM BLD-SCNC: 4 MMOL/L (ref 3.5–5.2)
PROT SERPL-MCNC: 7 G/DL (ref 6–8.5)
RBC # BLD AUTO: 3.85 10*6/MM3 (ref 3.9–5.2)
SODIUM BLD-SCNC: 141 MMOL/L (ref 136–145)
WBC NRBC COR # BLD: 10.56 10*3/MM3 (ref 4.5–10.7)

## 2018-05-27 PROCEDURE — 99284 EMERGENCY DEPT VISIT MOD MDM: CPT

## 2018-05-27 PROCEDURE — 25010000002 IOPAMIDOL 61 % SOLUTION: Performed by: EMERGENCY MEDICINE

## 2018-05-27 PROCEDURE — 25010000002 PIPERACILLIN SOD-TAZOBACTAM PER 1 G: Performed by: EMERGENCY MEDICINE

## 2018-05-27 PROCEDURE — 25010000002 MORPHINE PER 10 MG: Performed by: EMERGENCY MEDICINE

## 2018-05-27 PROCEDURE — 25010000002 HYDROMORPHONE HCL PF 500 MG/50ML SOLUTION: Performed by: INTERNAL MEDICINE

## 2018-05-27 PROCEDURE — 85025 COMPLETE CBC W/AUTO DIFF WBC: CPT | Performed by: EMERGENCY MEDICINE

## 2018-05-27 PROCEDURE — 25010000002 HEPARIN (PORCINE) PER 1000 UNITS: Performed by: INTERNAL MEDICINE

## 2018-05-27 PROCEDURE — 80053 COMPREHEN METABOLIC PANEL: CPT | Performed by: EMERGENCY MEDICINE

## 2018-05-27 PROCEDURE — 70491 CT SOFT TISSUE NECK W/DYE: CPT

## 2018-05-27 RX ORDER — FAMOTIDINE 40 MG/1
40 TABLET, FILM COATED ORAL DAILY
Status: DISCONTINUED | OUTPATIENT
Start: 2018-05-27 | End: 2018-05-29 | Stop reason: HOSPADM

## 2018-05-27 RX ORDER — NALOXONE HCL 0.4 MG/ML
0.4 VIAL (ML) INJECTION
Status: DISCONTINUED | OUTPATIENT
Start: 2018-05-27 | End: 2018-05-29 | Stop reason: HOSPADM

## 2018-05-27 RX ORDER — HEPARIN SODIUM 5000 [USP'U]/ML
5000 INJECTION, SOLUTION INTRAVENOUS; SUBCUTANEOUS EVERY 12 HOURS SCHEDULED
Status: DISCONTINUED | OUTPATIENT
Start: 2018-05-27 | End: 2018-05-29 | Stop reason: HOSPADM

## 2018-05-27 RX ORDER — DIAZEPAM 10 MG/1
10 TABLET ORAL NIGHTLY PRN
COMMUNITY
End: 2018-07-27

## 2018-05-27 RX ORDER — CLINDAMYCIN HYDROCHLORIDE 300 MG/1
300 CAPSULE ORAL 4 TIMES DAILY
COMMUNITY
End: 2018-05-29 | Stop reason: HOSPADM

## 2018-05-27 RX ORDER — ASPIRIN 81 MG/1
81 TABLET, CHEWABLE ORAL DAILY
Status: DISCONTINUED | OUTPATIENT
Start: 2018-05-27 | End: 2018-05-29 | Stop reason: HOSPADM

## 2018-05-27 RX ORDER — LISINOPRIL 10 MG/1
10 TABLET ORAL DAILY
Status: DISCONTINUED | OUTPATIENT
Start: 2018-05-27 | End: 2018-05-29 | Stop reason: HOSPADM

## 2018-05-27 RX ORDER — DILTIAZEM HYDROCHLORIDE 240 MG/1
240 CAPSULE, COATED, EXTENDED RELEASE ORAL DAILY
Status: DISCONTINUED | OUTPATIENT
Start: 2018-05-27 | End: 2018-05-29 | Stop reason: HOSPADM

## 2018-05-27 RX ORDER — CETIRIZINE HYDROCHLORIDE 10 MG/1
10 TABLET ORAL DAILY
Status: DISCONTINUED | OUTPATIENT
Start: 2018-05-27 | End: 2018-05-29 | Stop reason: HOSPADM

## 2018-05-27 RX ORDER — DIPHENHYDRAMINE HCL 25 MG
25 CAPSULE ORAL EVERY 6 HOURS
Status: COMPLETED | OUTPATIENT
Start: 2018-05-28 | End: 2018-05-28

## 2018-05-27 RX ORDER — ASPIRIN 81 MG/1
81 TABLET, CHEWABLE ORAL DAILY
COMMUNITY
End: 2019-08-08

## 2018-05-27 RX ORDER — HYDROMORPHONE HYDROCHLORIDE 1 MG/ML
0.5 INJECTION, SOLUTION INTRAMUSCULAR; INTRAVENOUS; SUBCUTANEOUS
Status: DISCONTINUED | OUTPATIENT
Start: 2018-05-27 | End: 2018-05-29 | Stop reason: HOSPADM

## 2018-05-27 RX ORDER — SODIUM CHLORIDE 0.9 % (FLUSH) 0.9 %
1-10 SYRINGE (ML) INJECTION AS NEEDED
Status: DISCONTINUED | OUTPATIENT
Start: 2018-05-27 | End: 2018-05-29 | Stop reason: HOSPADM

## 2018-05-27 RX ORDER — LOPERAMIDE HYDROCHLORIDE 2 MG/1
2 CAPSULE ORAL AS NEEDED
Status: DISCONTINUED | OUTPATIENT
Start: 2018-05-27 | End: 2018-05-29 | Stop reason: HOSPADM

## 2018-05-27 RX ORDER — SODIUM CHLORIDE 9 MG/ML
50 INJECTION, SOLUTION INTRAVENOUS CONTINUOUS
Status: DISCONTINUED | OUTPATIENT
Start: 2018-05-27 | End: 2018-05-29 | Stop reason: HOSPADM

## 2018-05-27 RX ORDER — ACETAMINOPHEN 325 MG/1
650 TABLET ORAL EVERY 4 HOURS PRN
Status: DISCONTINUED | OUTPATIENT
Start: 2018-05-27 | End: 2018-05-29 | Stop reason: HOSPADM

## 2018-05-27 RX ORDER — L.ACID,PARA/B.BIFIDUM/S.THERM 8B CELL
1 CAPSULE ORAL DAILY
Status: DISCONTINUED | OUTPATIENT
Start: 2018-05-27 | End: 2018-05-29 | Stop reason: HOSPADM

## 2018-05-27 RX ORDER — MORPHINE SULFATE 2 MG/ML
2 INJECTION, SOLUTION INTRAMUSCULAR; INTRAVENOUS ONCE
Status: COMPLETED | OUTPATIENT
Start: 2018-05-27 | End: 2018-05-27

## 2018-05-27 RX ORDER — ATORVASTATIN CALCIUM 20 MG/1
20 TABLET, FILM COATED ORAL DAILY
Status: DISCONTINUED | OUTPATIENT
Start: 2018-05-27 | End: 2018-05-29 | Stop reason: HOSPADM

## 2018-05-27 RX ORDER — MELATONIN
5000 DAILY
Status: DISCONTINUED | OUTPATIENT
Start: 2018-05-27 | End: 2018-05-29 | Stop reason: HOSPADM

## 2018-05-27 RX ORDER — DIAZEPAM 5 MG/1
10 TABLET ORAL NIGHTLY PRN
Status: DISCONTINUED | OUTPATIENT
Start: 2018-05-27 | End: 2018-05-29 | Stop reason: HOSPADM

## 2018-05-27 RX ORDER — ONDANSETRON 2 MG/ML
4 INJECTION INTRAMUSCULAR; INTRAVENOUS EVERY 6 HOURS PRN
Status: DISCONTINUED | OUTPATIENT
Start: 2018-05-27 | End: 2018-05-29 | Stop reason: HOSPADM

## 2018-05-27 RX ORDER — DEXAMETHASONE SODIUM PHOSPHATE 4 MG/ML
6 VIAL (ML) INJECTION EVERY 6 HOURS
Status: COMPLETED | OUTPATIENT
Start: 2018-05-28 | End: 2018-05-28

## 2018-05-27 RX ORDER — SODIUM CHLORIDE 0.9 % (FLUSH) 0.9 %
10 SYRINGE (ML) INJECTION AS NEEDED
Status: DISCONTINUED | OUTPATIENT
Start: 2018-05-27 | End: 2018-05-29 | Stop reason: HOSPADM

## 2018-05-27 RX ADMIN — IOPAMIDOL 80 ML: 612 INJECTION, SOLUTION INTRAVENOUS at 14:12

## 2018-05-27 RX ADMIN — HYDROMORPHONE HYDROCHLORIDE 0.5 MG: 10 INJECTION INTRAMUSCULAR; INTRAVENOUS; SUBCUTANEOUS at 20:19

## 2018-05-27 RX ADMIN — TAZOBACTAM SODIUM AND PIPERACILLIN SODIUM 4.5 G: 500; 4 INJECTION, SOLUTION INTRAVENOUS at 14:48

## 2018-05-27 RX ADMIN — DIPHENHYDRAMINE HYDROCHLORIDE 25 MG: 25 CAPSULE ORAL at 23:47

## 2018-05-27 RX ADMIN — HYDROMORPHONE HYDROCHLORIDE 0.5 MG: 10 INJECTION INTRAMUSCULAR; INTRAVENOUS; SUBCUTANEOUS at 23:54

## 2018-05-27 RX ADMIN — SODIUM CHLORIDE 100 ML/HR: 9 INJECTION, SOLUTION INTRAVENOUS at 20:25

## 2018-05-27 RX ADMIN — HEPARIN SODIUM 5000 UNITS: 5000 INJECTION, SOLUTION INTRAVENOUS; SUBCUTANEOUS at 23:47

## 2018-05-27 RX ADMIN — MORPHINE SULFATE 2 MG: 2 INJECTION, SOLUTION INTRAMUSCULAR; INTRAVENOUS at 13:55

## 2018-05-28 ENCOUNTER — APPOINTMENT (OUTPATIENT)
Dept: GENERAL RADIOLOGY | Facility: HOSPITAL | Age: 58
End: 2018-05-28

## 2018-05-28 PROBLEM — D72.829 LEUKOCYTOSIS: Status: ACTIVE | Noted: 2018-05-28

## 2018-05-28 LAB
ALBUMIN SERPL-MCNC: 3.9 G/DL (ref 3.5–5.2)
ALBUMIN/GLOB SERPL: 1.1 G/DL
ALP SERPL-CCNC: 123 U/L (ref 39–117)
ALT SERPL W P-5'-P-CCNC: 14 U/L (ref 1–33)
ANION GAP SERPL CALCULATED.3IONS-SCNC: 11.5 MMOL/L
AST SERPL-CCNC: 12 U/L (ref 1–32)
BASOPHILS # BLD AUTO: 0.01 10*3/MM3 (ref 0–0.2)
BASOPHILS NFR BLD AUTO: 0.1 % (ref 0–1.5)
BILIRUB SERPL-MCNC: 0.9 MG/DL (ref 0.1–1.2)
BUN BLD-MCNC: 10 MG/DL (ref 8–23)
BUN/CREAT SERPL: 15.9 (ref 7–25)
CALCIUM SPEC-SCNC: 9.3 MG/DL (ref 8.6–10.5)
CHLORIDE SERPL-SCNC: 99 MMOL/L (ref 98–107)
CO2 SERPL-SCNC: 26.5 MMOL/L (ref 22–29)
CREAT BLD-MCNC: 0.63 MG/DL (ref 0.57–1)
DEPRECATED RDW RBC AUTO: 49.4 FL (ref 37–54)
EOSINOPHIL # BLD AUTO: 0.06 10*3/MM3 (ref 0–0.7)
EOSINOPHIL NFR BLD AUTO: 0.5 % (ref 0.3–6.2)
ERYTHROCYTE [DISTWIDTH] IN BLOOD BY AUTOMATED COUNT: 13.6 % (ref 11.7–13)
GFR SERPL CREATININE-BSD FRML MDRD: 96 ML/MIN/1.73
GLOBULIN UR ELPH-MCNC: 3.5 GM/DL
GLUCOSE BLD-MCNC: 143 MG/DL (ref 65–99)
HCT VFR BLD AUTO: 39 % (ref 35.6–45.5)
HGB BLD-MCNC: 12.8 G/DL (ref 11.9–15.5)
IMM GRANULOCYTES # BLD: 0.02 10*3/MM3 (ref 0–0.03)
IMM GRANULOCYTES NFR BLD: 0.2 % (ref 0–0.5)
LYMPHOCYTES # BLD AUTO: 0.74 10*3/MM3 (ref 0.9–4.8)
LYMPHOCYTES NFR BLD AUTO: 6 % (ref 19.6–45.3)
MCH RBC QN AUTO: 32.7 PG (ref 26.9–32)
MCHC RBC AUTO-ENTMCNC: 32.8 G/DL (ref 32.4–36.3)
MCV RBC AUTO: 99.5 FL (ref 80.5–98.2)
MONOCYTES # BLD AUTO: 0.18 10*3/MM3 (ref 0.2–1.2)
MONOCYTES NFR BLD AUTO: 1.5 % (ref 5–12)
NEUTROPHILS # BLD AUTO: 11.29 10*3/MM3 (ref 1.9–8.1)
NEUTROPHILS NFR BLD AUTO: 91.9 % (ref 42.7–76)
PLATELET # BLD AUTO: 277 10*3/MM3 (ref 140–500)
PMV BLD AUTO: 10.3 FL (ref 6–12)
POTASSIUM BLD-SCNC: 4.1 MMOL/L (ref 3.5–5.2)
PROT SERPL-MCNC: 7.4 G/DL (ref 6–8.5)
RBC # BLD AUTO: 3.92 10*6/MM3 (ref 3.9–5.2)
SODIUM BLD-SCNC: 137 MMOL/L (ref 136–145)
WBC NRBC COR # BLD: 12.28 10*3/MM3 (ref 4.5–10.7)

## 2018-05-28 PROCEDURE — 70355 PANORAMIC X-RAY OF JAWS: CPT

## 2018-05-28 PROCEDURE — 85025 COMPLETE CBC W/AUTO DIFF WBC: CPT | Performed by: INTERNAL MEDICINE

## 2018-05-28 PROCEDURE — 25010000002 DEXAMETHASONE PER 1 MG: Performed by: INTERNAL MEDICINE

## 2018-05-28 PROCEDURE — 80053 COMPREHEN METABOLIC PANEL: CPT | Performed by: INTERNAL MEDICINE

## 2018-05-28 PROCEDURE — 25010000002 HEPARIN (PORCINE) PER 1000 UNITS: Performed by: INTERNAL MEDICINE

## 2018-05-28 PROCEDURE — 25010000003 AMPICILLIN-SULBACTAM PER 1.5 G: Performed by: INTERNAL MEDICINE

## 2018-05-28 PROCEDURE — 63710000001 DIPHENHYDRAMINE PER 50 MG: Performed by: INTERNAL MEDICINE

## 2018-05-28 RX ADMIN — DIPHENHYDRAMINE HYDROCHLORIDE 25 MG: 25 CAPSULE ORAL at 12:21

## 2018-05-28 RX ADMIN — SODIUM CHLORIDE 3 G: 900 INJECTION INTRAVENOUS at 19:11

## 2018-05-28 RX ADMIN — LOPERAMIDE HYDROCHLORIDE 2 MG: 2 CAPSULE ORAL at 20:43

## 2018-05-28 RX ADMIN — HEPARIN SODIUM 5000 UNITS: 5000 INJECTION, SOLUTION INTRAVENOUS; SUBCUTANEOUS at 08:25

## 2018-05-28 RX ADMIN — SODIUM CHLORIDE 50 ML/HR: 9 INJECTION, SOLUTION INTRAVENOUS at 10:01

## 2018-05-28 RX ADMIN — DEXAMETHASONE SODIUM PHOSPHATE 6 MG: 4 INJECTION, SOLUTION INTRAMUSCULAR; INTRAVENOUS at 19:12

## 2018-05-28 RX ADMIN — DEXAMETHASONE SODIUM PHOSPHATE 6 MG: 4 INJECTION, SOLUTION INTRAMUSCULAR; INTRAVENOUS at 07:08

## 2018-05-28 RX ADMIN — DIPHENHYDRAMINE HYDROCHLORIDE 25 MG: 25 CAPSULE ORAL at 19:12

## 2018-05-28 RX ADMIN — DEXAMETHASONE SODIUM PHOSPHATE 6 MG: 4 INJECTION, SOLUTION INTRAMUSCULAR; INTRAVENOUS at 12:21

## 2018-05-28 RX ADMIN — SODIUM CHLORIDE 3 G: 900 INJECTION INTRAVENOUS at 01:20

## 2018-05-28 RX ADMIN — SODIUM CHLORIDE 3 G: 900 INJECTION INTRAVENOUS at 07:08

## 2018-05-28 RX ADMIN — DEXAMETHASONE SODIUM PHOSPHATE 6 MG: 4 INJECTION, SOLUTION INTRAMUSCULAR; INTRAVENOUS at 01:19

## 2018-05-28 RX ADMIN — SODIUM CHLORIDE 3 G: 900 INJECTION INTRAVENOUS at 12:22

## 2018-05-28 RX ADMIN — DIPHENHYDRAMINE HYDROCHLORIDE 25 MG: 25 CAPSULE ORAL at 07:08

## 2018-05-29 VITALS
RESPIRATION RATE: 16 BRPM | BODY MASS INDEX: 43.59 KG/M2 | HEART RATE: 82 BPM | WEIGHT: 246 LBS | SYSTOLIC BLOOD PRESSURE: 121 MMHG | HEIGHT: 63 IN | TEMPERATURE: 97.8 F | DIASTOLIC BLOOD PRESSURE: 67 MMHG | OXYGEN SATURATION: 92 %

## 2018-05-29 LAB
ANION GAP SERPL CALCULATED.3IONS-SCNC: 13.2 MMOL/L
BUN BLD-MCNC: 11 MG/DL (ref 8–23)
BUN/CREAT SERPL: 19.6 (ref 7–25)
CALCIUM SPEC-SCNC: 9.5 MG/DL (ref 8.6–10.5)
CHLORIDE SERPL-SCNC: 103 MMOL/L (ref 98–107)
CO2 SERPL-SCNC: 25.8 MMOL/L (ref 22–29)
CREAT BLD-MCNC: 0.56 MG/DL (ref 0.57–1)
DEPRECATED RDW RBC AUTO: 46.6 FL (ref 37–54)
ERYTHROCYTE [DISTWIDTH] IN BLOOD BY AUTOMATED COUNT: 12.9 % (ref 11.7–13)
GFR SERPL CREATININE-BSD FRML MDRD: 110 ML/MIN/1.73
GLUCOSE BLD-MCNC: 148 MG/DL (ref 65–99)
HCT VFR BLD AUTO: 38.1 % (ref 35.6–45.5)
HGB BLD-MCNC: 12.3 G/DL (ref 11.9–15.5)
MCH RBC QN AUTO: 32.1 PG (ref 26.9–32)
MCHC RBC AUTO-ENTMCNC: 32.3 G/DL (ref 32.4–36.3)
MCV RBC AUTO: 99.5 FL (ref 80.5–98.2)
PLATELET # BLD AUTO: 325 10*3/MM3 (ref 140–500)
PMV BLD AUTO: 10.5 FL (ref 6–12)
POTASSIUM BLD-SCNC: 3.5 MMOL/L (ref 3.5–5.2)
RBC # BLD AUTO: 3.83 10*6/MM3 (ref 3.9–5.2)
SODIUM BLD-SCNC: 142 MMOL/L (ref 136–145)
WBC NRBC COR # BLD: 13.18 10*3/MM3 (ref 4.5–10.7)

## 2018-05-29 PROCEDURE — 25010000003 AMPICILLIN-SULBACTAM PER 1.5 G: Performed by: INTERNAL MEDICINE

## 2018-05-29 PROCEDURE — 80048 BASIC METABOLIC PNL TOTAL CA: CPT | Performed by: HOSPITALIST

## 2018-05-29 PROCEDURE — 99239 HOSP IP/OBS DSCHRG MGMT >30: CPT | Performed by: INTERNAL MEDICINE

## 2018-05-29 PROCEDURE — 85027 COMPLETE CBC AUTOMATED: CPT | Performed by: HOSPITALIST

## 2018-05-29 RX ORDER — AMOXICILLIN AND CLAVULANATE POTASSIUM 875; 125 MG/1; MG/1
1 TABLET, FILM COATED ORAL EVERY 12 HOURS SCHEDULED
Qty: 14 TABLET | Refills: 0 | Status: SHIPPED | OUTPATIENT
Start: 2018-05-29 | End: 2018-06-15

## 2018-05-29 RX ADMIN — SODIUM CHLORIDE 3 G: 900 INJECTION INTRAVENOUS at 00:18

## 2018-05-29 RX ADMIN — SODIUM CHLORIDE 3 G: 900 INJECTION INTRAVENOUS at 06:16

## 2018-05-29 RX ADMIN — ACETAMINOPHEN 650 MG: 325 TABLET, FILM COATED ORAL at 03:49

## 2018-06-04 ENCOUNTER — OFFICE VISIT (OUTPATIENT)
Dept: INTERNAL MEDICINE | Facility: CLINIC | Age: 58
End: 2018-06-04

## 2018-06-04 ENCOUNTER — TRANSCRIBE ORDERS (OUTPATIENT)
Dept: ADMINISTRATIVE | Facility: HOSPITAL | Age: 58
End: 2018-06-04

## 2018-06-04 VITALS
HEART RATE: 89 BPM | OXYGEN SATURATION: 95 % | RESPIRATION RATE: 16 BRPM | TEMPERATURE: 98.5 F | HEIGHT: 63 IN | DIASTOLIC BLOOD PRESSURE: 80 MMHG | SYSTOLIC BLOOD PRESSURE: 118 MMHG | WEIGHT: 238.6 LBS | BODY MASS INDEX: 42.28 KG/M2

## 2018-06-04 DIAGNOSIS — T85.79XA: Primary | ICD-10-CM

## 2018-06-04 DIAGNOSIS — Z13.9 VISIT FOR SCREENING: Primary | ICD-10-CM

## 2018-06-04 PROCEDURE — 99213 OFFICE O/P EST LOW 20 MIN: CPT | Performed by: INTERNAL MEDICINE

## 2018-06-04 NOTE — PROGRESS NOTES
Subjective   Ann-Marie Eaton is a 58 y.o. female.     Chief Complaint   Patient presents with   • Facial Swelling     x 1 wkER 5/27/18, dental implant 4/27/18 5 on top, 4 on bottom, removed 1 bottom implant x1.5wk agoi         Facial Swelling   This is a new problem. The current episode started 1 to 4 weeks ago. The problem occurs constantly. The problem has been waxing and waning. Associated symptoms include nausea. Pertinent negatives include no chills, fever, sore throat or vomiting. Nothing aggravates the symptoms. The treatment provided moderate relief.        The following portions of the patient's history were reviewed and updated as appropriate: allergies, current medications, past social history and problem list.    Outpatient Prescriptions Marked as Taking for the 6/4/18 encounter (Office Visit) with John Cowan MD   Medication Sig Dispense Refill   • amoxicillin-clavulanate (AUGMENTIN) 875-125 MG per tablet Take 1 tablet by mouth Every 12 (Twelve) Hours. 14 tablet 0   • aspirin 81 MG chewable tablet Chew 81 mg Daily.     • atorvastatin (LIPITOR) 20 MG tablet Take 1 tablet by mouth Daily. 90 tablet 1   • cetirizine (ZyrTEC) 10 MG tablet Take 10 mg by mouth daily.     • diazePAM (VALIUM) 10 MG tablet Take 10 mg by mouth At Night As Needed for Anxiety (for grinding teeth). Patient taking half a tablet qHS for grinding teeth since dental procedure April 27th, 2018.     • dicyclomine (BENTYL) 10 MG capsule Take 10 mg by mouth As Needed.     • diltiaZEM CD (CARTIA XT) 240 MG 24 hr capsule Take 1 capsule by mouth Daily. 90 capsule 1   • ezetimibe (ZETIA) 10 MG tablet Take 1 tablet by mouth Daily. 90 tablet 1   • lisinopril (PRINIVIL,ZESTRIL) 10 MG tablet Take 1 tablet by mouth Daily. 90 tablet 1   • loperamide (IMODIUM) 2 MG capsule Take 2 mg by mouth as needed for diarrhea.     • Probiotic Product (PROBIOTIC DAILY PO) Take  by mouth. Taking while on antibiotic therapy.     • raNITIdine (ZANTAC) 150 MG tablet  Take 1 tablet by mouth Every Night. 90 tablet 1   • sertraline (ZOLOFT) 100 MG tablet Take 1.5 tablets by mouth Daily. 135 tablet 1   • valACYclovir (VALTREX) 500 MG tablet Take 1 tablet by mouth Daily. 90 tablet 1   • vitamin D (ERGOCALCIFEROL) 14257 units capsule capsule Take 1 capsule by mouth 1 (One) Time Per Week. 13 capsule 1       Review of Systems   Constitutional: Negative for chills and fever.   HENT: Positive for facial swelling. Negative for sore throat.    Gastrointestinal: Positive for nausea. Negative for vomiting.       Objective   Vitals:    06/04/18 0927   BP: 118/80   Pulse: 89   Resp: 16   Temp: 98.5 °F (36.9 °C)   SpO2: 95%      1    06/04/18 0927   Weight: 108 kg (238 lb 9.6 oz)    [unfilled]  Body mass index is 42.28 kg/m².      Physical Exam   Constitutional: She appears well-developed and well-nourished.   Neck: Normal range of motion. Neck supple. No tracheal deviation present. No thyromegaly present.   Lymphadenopathy:     She has no cervical adenopathy.         Problem List Items Addressed This Visit        Other    Dental will-implantitis - Primary        Assessment/Plan   In today for recheck from hospitalization with submandibular abscess related to infection around her dental implant.  She been on antibiotics for about 3 weeks prior to coming into the hospital.  Was given IV Avelox for a few days and went home 6 days ago and had an implant removal.  Since then she's doing very well.  Her examination is fantastic today.  I feel very little pathology in the neck.  She is having diarrhea about 3 times a day but is loose, not watery.  On a probiotic.  On Augmentin 875 twice a day.  We'll going to stop her antibiotics today.  Call if she has any worsening in her neck symptoms.  She also plans to get her hep A shots.       Current outpatient and discharge medications have been reconciled for the patient.  Reviewed by: John Cowan MD    Patient's Body mass index is 42.28 kg/m². BMI  is above normal parameters. Recommendations include: educational material and exercise counseling.    Dragon disclaimer:   Much of this encounter note is an electronic transcription/translation of spoken language to printed text. The electronic translation of spoken language may permit erroneous, or at times, nonsensical words or phrases to be inadvertently transcribed; Although I have reviewed the note for such errors, some may still exist.

## 2018-06-14 ENCOUNTER — TELEPHONE (OUTPATIENT)
Dept: INTERNAL MEDICINE | Facility: CLINIC | Age: 58
End: 2018-06-14

## 2018-06-14 RX ORDER — AMOXICILLIN AND CLAVULANATE POTASSIUM 875; 125 MG/1; MG/1
1 TABLET, FILM COATED ORAL EVERY 12 HOURS SCHEDULED
Qty: 14 TABLET | Refills: 0 | Status: SHIPPED | OUTPATIENT
Start: 2018-06-14 | End: 2018-07-27

## 2018-06-14 NOTE — TELEPHONE ENCOUNTER
Patient was in the office 6-4-18 with submandibular abcess related to infection around dental implant. Per your office note, if patient has any worsening symptoms in her neck to call the office. This morning she started with pain and swollen lymph nodes in the neck again. Within the last hour it has worsened. Please advise.

## 2018-06-14 NOTE — TELEPHONE ENCOUNTER
Per Dr. Cowan e-scribe Augmentin and have patient come in for appointment tomorrow.    E-scribed medication & scheduled appt for tomorrow.

## 2018-06-15 ENCOUNTER — OFFICE VISIT (OUTPATIENT)
Dept: INTERNAL MEDICINE | Facility: CLINIC | Age: 58
End: 2018-06-15

## 2018-06-15 VITALS
DIASTOLIC BLOOD PRESSURE: 62 MMHG | RESPIRATION RATE: 16 BRPM | BODY MASS INDEX: 43.77 KG/M2 | SYSTOLIC BLOOD PRESSURE: 122 MMHG | HEART RATE: 68 BPM | OXYGEN SATURATION: 97 % | HEIGHT: 63 IN | TEMPERATURE: 98.1 F | WEIGHT: 247 LBS

## 2018-06-15 DIAGNOSIS — T85.79XA: Primary | ICD-10-CM

## 2018-06-15 PROCEDURE — 99213 OFFICE O/P EST LOW 20 MIN: CPT | Performed by: INTERNAL MEDICINE

## 2018-06-15 NOTE — PROGRESS NOTES
Subjective   Ann-Marie Eaton is a 58 y.o. female.     Chief Complaint   Patient presents with   • Neck Pain     TOOTH ABCESS x 1 day         Neck Pain    This is a new problem. The current episode started in the past 7 days. The problem occurs constantly. The problem has been gradually worsening. The pain is associated with nothing. Pertinent negatives include no fever, headaches or pain with swallowing. The treatment provided no relief.        The following portions of the patient's history were reviewed and updated as appropriate: allergies, current medications, past social history and problem list.    Outpatient Prescriptions Marked as Taking for the 6/15/18 encounter (Office Visit) with John Cowan MD   Medication Sig Dispense Refill   • amoxicillin-clavulanate (AUGMENTIN) 875-125 MG per tablet Take 1 tablet by mouth Every 12 (Twelve) Hours. 14 tablet 0   • aspirin 81 MG chewable tablet Chew 81 mg Daily.     • atorvastatin (LIPITOR) 20 MG tablet Take 1 tablet by mouth Daily. 90 tablet 1   • cetirizine (ZyrTEC) 10 MG tablet Take 10 mg by mouth daily.     • diazePAM (VALIUM) 10 MG tablet Take 10 mg by mouth At Night As Needed for Anxiety (for grinding teeth). Patient taking half a tablet qHS for grinding teeth since dental procedure April 27th, 2018.     • dicyclomine (BENTYL) 10 MG capsule Take 10 mg by mouth As Needed.     • diltiaZEM CD (CARTIA XT) 240 MG 24 hr capsule Take 1 capsule by mouth Daily. 90 capsule 1   • ezetimibe (ZETIA) 10 MG tablet Take 1 tablet by mouth Daily. 90 tablet 1   • lisinopril (PRINIVIL,ZESTRIL) 10 MG tablet Take 1 tablet by mouth Daily. 90 tablet 1   • loperamide (IMODIUM) 2 MG capsule Take 2 mg by mouth as needed for diarrhea.     • Probiotic Product (PROBIOTIC DAILY PO) Take  by mouth. Taking while on antibiotic therapy.     • raNITIdine (ZANTAC) 150 MG tablet Take 1 tablet by mouth Every Night. 90 tablet 1   • sertraline (ZOLOFT) 100 MG tablet Take 1.5 tablets by mouth Daily. 135  tablet 1   • valACYclovir (VALTREX) 500 MG tablet Take 1 tablet by mouth Daily. 90 tablet 1   • vitamin D (ERGOCALCIFEROL) 22159 units capsule capsule Take 1 capsule by mouth 1 (One) Time Per Week. 13 capsule 1       Review of Systems   Constitutional: Negative for chills and fever.   HENT: Positive for dental problem. Negative for drooling, postnasal drip, rhinorrhea, sinus pressure and sore throat.    Musculoskeletal: Positive for neck pain.   Neurological: Negative for headaches.       Objective   Vitals:    06/15/18 0811   BP: 122/62   Pulse: 68   Resp: 16   Temp: 98.1 °F (36.7 °C)   SpO2: 97%      1    06/15/18  0811   Weight: 112 kg (247 lb)    [unfilled]  Body mass index is 43.77 kg/m².      Physical Exam   Constitutional: She appears well-developed and well-nourished.   Neck: Normal range of motion. Neck supple. No tracheal deviation present. No thyromegaly present.   Lymphadenopathy:        Head (left side): Submental adenopathy present.         Problem List Items Addressed This Visit        Other    Dental will-implantitis - Primary    Relevant Orders    CT soft tissue neck w contrast        Assessment/Plan   In with swelling in the left submandibular region.  Seems be getting worse.  Symptoms for 3-4 days.  Began on Augmentin last night.  She was just discharged from [facility] about 3 weeks ago with a dental abscess that extended into the left neck region causing a phlegmon in that area.  She was treated with IV antibiotics, Zosyn, and discharged on Augmentin for another 3-5 days.  She did had a removal of the implant.  She does have some firmness right under the angle of the jaw.  We'll continue Augmentin for now.  Recheck a CT scan of the soft tissues of the neck with contrast.  Mammogram is scheduled for next week.  Due for Shingrix follow-up shot.           .[email or web address]  Dragon disclaimer:   Much of this encounter note is an electronic transcription/translation of spoken language to printed  text. The electronic translation of spoken language may permit erroneous, or at times, nonsensical words or phrases to be inadvertently transcribed; Although I have reviewed the note for such errors, some may still exist.

## 2018-06-16 ENCOUNTER — HOSPITAL ENCOUNTER (OUTPATIENT)
Dept: CT IMAGING | Facility: HOSPITAL | Age: 58
Discharge: HOME | End: 2018-06-16
Attending: INTERNAL MEDICINE | Admitting: INTERNAL MEDICINE

## 2018-06-16 DIAGNOSIS — T85.79XA: ICD-10-CM

## 2018-06-16 LAB — CREAT BLDA-MCNC: 0.5 MG/DL (ref 0.6–1.3)

## 2018-06-16 PROCEDURE — 25010000002 IOPAMIDOL 61 % SOLUTION: Performed by: INTERNAL MEDICINE

## 2018-06-16 PROCEDURE — 82565 ASSAY OF CREATININE: CPT

## 2018-06-16 PROCEDURE — 70491 CT SOFT TISSUE NECK W/DYE: CPT

## 2018-06-16 RX ADMIN — IOPAMIDOL 85 ML: 612 INJECTION, SOLUTION INTRAVENOUS at 09:58

## 2018-06-19 ENCOUNTER — APPOINTMENT (OUTPATIENT)
Dept: MAMMOGRAPHY | Facility: HOSPITAL | Age: 58
End: 2018-06-19
Attending: INTERNAL MEDICINE

## 2018-07-03 ENCOUNTER — APPOINTMENT (OUTPATIENT)
Dept: MAMMOGRAPHY | Facility: HOSPITAL | Age: 58
End: 2018-07-03
Attending: INTERNAL MEDICINE

## 2018-07-27 ENCOUNTER — LAB (OUTPATIENT)
Dept: LAB | Facility: HOSPITAL | Age: 58
End: 2018-07-27

## 2018-07-27 ENCOUNTER — OFFICE VISIT (OUTPATIENT)
Dept: INTERNAL MEDICINE | Facility: CLINIC | Age: 58
End: 2018-07-27

## 2018-07-27 VITALS
WEIGHT: 240.6 LBS | SYSTOLIC BLOOD PRESSURE: 130 MMHG | HEART RATE: 81 BPM | TEMPERATURE: 98.6 F | BODY MASS INDEX: 42.63 KG/M2 | HEIGHT: 63 IN | OXYGEN SATURATION: 97 % | DIASTOLIC BLOOD PRESSURE: 80 MMHG | RESPIRATION RATE: 16 BRPM

## 2018-07-27 DIAGNOSIS — I10 ESSENTIAL HYPERTENSION: ICD-10-CM

## 2018-07-27 DIAGNOSIS — E78.5 HYPERLIPIDEMIA, UNSPECIFIED HYPERLIPIDEMIA TYPE: ICD-10-CM

## 2018-07-27 DIAGNOSIS — K58.0 IRRITABLE BOWEL SYNDROME WITH DIARRHEA: ICD-10-CM

## 2018-07-27 DIAGNOSIS — K76.0 FATTY LIVER DISEASE, NONALCOHOLIC: ICD-10-CM

## 2018-07-27 DIAGNOSIS — K21.9 GASTROESOPHAGEAL REFLUX DISEASE, ESOPHAGITIS PRESENCE NOT SPECIFIED: ICD-10-CM

## 2018-07-27 DIAGNOSIS — Z00.00 ENCOUNTER FOR PREVENTIVE HEALTH EXAMINATION: Primary | ICD-10-CM

## 2018-07-27 DIAGNOSIS — E78.5 HYPERLIPIDEMIA, UNSPECIFIED HYPERLIPIDEMIA TYPE: Primary | ICD-10-CM

## 2018-07-27 PROBLEM — K12.2 SUBMANDIBULAR SPACE INFECTION: Status: RESOLVED | Noted: 2018-05-27 | Resolved: 2018-07-27

## 2018-07-27 LAB
ALBUMIN SERPL-MCNC: 3.9 G/DL (ref 3.5–5.2)
ALP SERPL-CCNC: 93 U/L (ref 39–117)
ALT SERPL W P-5'-P-CCNC: 17 U/L (ref 1–33)
AST SERPL-CCNC: 14 U/L (ref 1–32)
BILIRUB CONJ SERPL-MCNC: <0.2 MG/DL (ref 0–0.3)
BILIRUB INDIRECT SERPL-MCNC: NORMAL MG/DL
BILIRUB SERPL-MCNC: 0.5 MG/DL (ref 0.1–1.2)
CHOLEST SERPL-MCNC: 121 MG/DL (ref 0–200)
HDLC SERPL-MCNC: 42 MG/DL (ref 40–60)
LDLC SERPL CALC-MCNC: 66 MG/DL (ref 0–100)
LDLC/HDLC SERPL: 1.56 {RATIO}
PROT SERPL-MCNC: 6.5 G/DL (ref 6–8.5)
TRIGL SERPL-MCNC: 67 MG/DL (ref 0–150)
VLDLC SERPL-MCNC: 13.4 MG/DL (ref 5–40)

## 2018-07-27 PROCEDURE — 99396 PREV VISIT EST AGE 40-64: CPT | Performed by: INTERNAL MEDICINE

## 2018-07-27 PROCEDURE — 36415 COLL VENOUS BLD VENIPUNCTURE: CPT

## 2018-07-27 PROCEDURE — 80061 LIPID PANEL: CPT | Performed by: INTERNAL MEDICINE

## 2018-07-27 PROCEDURE — 80076 HEPATIC FUNCTION PANEL: CPT

## 2018-07-27 NOTE — PATIENT INSTRUCTIONS
Exercising to Stay Healthy  Exercising regularly is important. It has many health benefits, such as:  · Improving your overall fitness, flexibility, and endurance.  · Increasing your bone density.  · Helping with weight control.  · Decreasing your body fat.  · Increasing your muscle strength.  · Reducing stress and tension.  · Improving your overall health.    In order to become healthy and stay healthy, it is recommended that you do moderate-intensity and vigorous-intensity exercise. You can tell that you are exercising at a moderate intensity if you have a higher heart rate and faster breathing, but you are still able to hold a conversation. You can tell that you are exercising at a vigorous intensity if you are breathing much harder and faster and cannot hold a conversation while exercising.  How often should I exercise?  Choose an activity that you enjoy and set realistic goals. Your health care provider can help you to make an activity plan that works for you. Exercise regularly as directed by your health care provider. This may include:  · Doing resistance training twice each week, such as:  ? Push-ups.  ? Sit-ups.  ? Lifting weights.  ? Using resistance bands.  · Doing a given intensity of exercise for a given amount of time. Choose from these options:  ? 150 minutes of moderate-intensity exercise every week.  ? 75 minutes of vigorous-intensity exercise every week.  ? A mix of moderate-intensity and vigorous-intensity exercise every week.    Children, pregnant women, people who are out of shape, people who are overweight, and older adults may need to consult a health care provider for individual recommendations. If you have any sort of medical condition, be sure to consult your health care provider before starting a new exercise program.  What are some exercise ideas?  Some moderate-intensity exercise ideas include:  · Walking at a rate of 1 mile in 15  minutes.  · Biking.  · Hiking.  · Golfing.  · Dancing.    Some vigorous-intensity exercise ideas include:  · Walking at a rate of at least 4.5 miles per hour.  · Jogging or running at a rate of 5 miles per hour.  · Biking at a rate of at least 10 miles per hour.  · Lap swimming.  · Roller-skating or in-line skating.  · Cross-country skiing.  · Vigorous competitive sports, such as football, basketball, and soccer.  · Jumping rope.  · Aerobic dancing.    What are some everyday activities that can help me to get exercise?  · Yard work, such as:  ? Pushing a .  ? Raking and bagging leaves.  · Washing and waxing your car.  · Pushing a stroller.  · Shoveling snow.  · Gardening.  · Washing windows or floors.  How can I be more active in my day-to-day activities?  · Use the stairs instead of the elevator.  · Take a walk during your lunch break.  · If you drive, park your car farther away from work or school.  · If you take public transportation, get off one stop early and walk the rest of the way.  · Make all of your phone calls while standing up and walking around.  · Get up, stretch, and walk around every 30 minutes throughout the day.  What guidelines should I follow while exercising?  · Do not exercise so much that you hurt yourself, feel dizzy, or get very short of breath.  · Consult your health care provider before starting a new exercise program.  · Wear comfortable clothes and shoes with good support.  · Drink plenty of water while you exercise to prevent dehydration or heat stroke. Body water is lost during exercise and must be replaced.  · Work out until you breathe faster and your heart beats faster.  This information is not intended to replace advice given to you by your health care provider. Make sure you discuss any questions you have with your health care provider.  Document Released: 01/20/2012 Document Revised: 05/25/2017 Document Reviewed: 05/21/2015  [employer] Interactive Patient Education © 2018  [employer]  Calorie Counting for Weight Loss  Calories are units of energy. Your body needs a certain amount of calories from food to keep you going throughout the day. When you eat more calories than your body needs, your body stores the extra calories as fat. When you eat fewer calories than your body needs, your body burns fat to get the energy it needs.  Calorie counting means keeping track of how many calories you eat and drink each day. Calorie counting can be helpful if you need to lose weight. If you make sure to eat fewer calories than your body needs, you should lose weight. Ask your health care provider what a healthy weight is for you.  For calorie counting to work, you will need to eat the right number of calories in a day in order to lose a healthy amount of weight per week. A dietitian can help you determine how many calories you need in a day and will give you suggestions on how to reach your calorie goal.  · A healthy amount of weight to lose per week is usually 1-2 lb (0.5-0.9 kg). This usually means that your daily calorie intake should be reduced by 500-750 calories.  · Eating 1,200 - 1,500 calories per day can help most women lose weight.  · Eating 1,500 - 1,800 calories per day can help most men lose weight.    What do I need to know about calorie counting?  In order to meet your daily calorie goal, you will need to:  · Find out how many calories are in each food you would like to eat. Try to do this before you eat.  · Decide how much of the food you plan to eat.  · Write down what you ate and how many calories it had. Doing this is called keeping a food log.    To successfully lose weight, it is important to balance calorie counting with a healthy lifestyle that includes regular activity. Aim for 150 minutes of moderate exercise (such as walking) or 75 minutes of vigorous exercise (such as running) each week.  Where do I find calorie information?    The number of calories in a food can be  found on a Nutrition Facts label. If a food does not have a Nutrition Facts label, try to look up the calories online or ask your dietitian for help.  Remember that calories are listed per serving. If you choose to have more than one serving of a food, you will have to multiply the calories per serving by the amount of servings you plan to eat. For example, the label on a package of bread might say that a serving size is 1 slice and that there are 90 calories in a serving. If you eat 1 slice, you will have eaten 90 calories. If you eat 2 slices, you will have eaten 180 calories.  How do I keep a food log?  Immediately after each meal, record the following information in your food log:  · What you ate. Don't forget to include toppings, sauces, and other extras on the food.  · How much you ate. This can be measured in cups, ounces, or number of items.  · How many calories each food and drink had.  · The total number of calories in the meal.    Keep your food log near you, such as in a small notebook in your pocket, or use a mobile krystal or website. Some programs will calculate calories for you and show you how many calories you have left for the day to meet your goal.  What are some calorie counting tips?  · Use your calories on foods and drinks that will fill you up and not leave you hungry:  ? Some examples of foods that fill you up are nuts and nut butters, vegetables, lean proteins, and high-fiber foods like whole grains. High-fiber foods are foods with more than 5 g fiber per serving.  ? Drinks such as sodas, specialty coffee drinks, alcohol, and juices have a lot of calories, yet do not fill you up.  · Eat nutritious foods and avoid empty calories. Empty calories are calories you get from foods or beverages that do not have many vitamins or protein, such as candy, sweets, and soda. It is better to have a nutritious high-calorie food (such as an avocado) than a food with few nutrients (such as a bag of  "chips).  · Know how many calories are in the foods you eat most often. This will help you calculate calorie counts faster.  · Pay attention to calories in drinks. Low-calorie drinks include water and unsweetened drinks.  · Pay attention to nutrition labels for \"low fat\" or \"fat free\" foods. These foods sometimes have the same amount of calories or more calories than the full fat versions. They also often have added sugar, starch, or salt, to make up for flavor that was removed with the fat.  · Find a way of tracking calories that works for you. Get creative. Try different apps or programs if writing down calories does not work for you.  What are some portion control tips?  · Know how many calories are in a serving. This will help you know how many servings of a certain food you can have.  · Use a measuring cup to measure serving sizes. You could also try weighing out portions on a kitchen scale. With time, you will be able to estimate serving sizes for some foods.  · Take some time to put servings of different foods on your favorite plates, bowls, and cups so you know what a serving looks like.  · Try not to eat straight from a bag or box. Doing this can lead to overeating. Put the amount you would like to eat in a cup or on a plate to make sure you are eating the right portion.  · Use smaller plates, glasses, and bowls to prevent overeating.  · Try not to multitask (for example, watch TV or use your computer) while eating. If it is time to eat, sit down at a table and enjoy your food. This will help you to know when you are full. It will also help you to be aware of what you are eating and how much you are eating.  What are tips for following this plan?  Reading food labels  · Check the calorie count compared to the serving size. The serving size may be smaller than what you are used to eating.  · Check the source of the calories. Make sure the food you are eating is high in vitamins and protein and low in " saturated and trans fats.  Shopping  · Read nutrition labels while you shop. This will help you make healthy decisions before you decide to purchase your food.  · Make a grocery list and stick to it.  Cooking  · Try to cook your favorite foods in a healthier way. For example, try baking instead of frying.  · Use low-fat dairy products.  Meal planning  · Use more fruits and vegetables. Half of your plate should be fruits and vegetables.  · Include lean proteins like poultry and fish.  How do I count calories when eating out?  · Ask for smaller portion sizes.  · Consider sharing an entree and sides instead of getting your own entree.  · If you get your own entree, eat only half. Ask for a box at the beginning of your meal and put the rest of your entree in it so you are not tempted to eat it.  · If calories are listed on the menu, choose the lower calorie options.  · Choose dishes that include vegetables, fruits, whole grains, low-fat dairy products, and lean protein.  · Choose items that are boiled, broiled, grilled, or steamed. Stay away from items that are buttered, battered, fried, or served with cream sauce. Items labeled “crispy” are usually fried, unless stated otherwise.  · Choose water, low-fat milk, unsweetened iced tea, or other drinks without added sugar. If you want an alcoholic beverage, choose a lower calorie option such as a glass of wine or light beer.  · Ask for dressings, sauces, and syrups on the side. These are usually high in calories, so you should limit the amount you eat.  · If you want a salad, choose a garden salad and ask for grilled meats. Avoid extra toppings like felder, cheese, or fried items. Ask for the dressing on the side, or ask for olive oil and vinegar or lemon to use as dressing.  · Estimate how many servings of a food you are given. For example, a serving of cooked rice is ½ cup or about the size of half a baseball. Knowing serving sizes will help you be aware of how much food  you are eating at restaurants. The list below tells you how big or small some common portion sizes are based on everyday objects:  ? 1 oz--4 stacked dice.  ? 3 oz--1 deck of cards.  ? 1 tsp--1 die.  ? 1 Tbsp--½ a ping-pong ball.  ? 2 Tbsp--1 ping-pong ball.  ? ½ cup--½ baseball.  ? 1 cup--1 baseball.  Summary  · Calorie counting means keeping track of how many calories you eat and drink each day. If you eat fewer calories than your body needs, you should lose weight.  · A healthy amount of weight to lose per week is usually 1-2 lb (0.5-0.9 kg). This usually means reducing your daily calorie intake by 500-750 calories.  · The number of calories in a food can be found on a Nutrition Facts label. If a food does not have a Nutrition Facts label, try to look up the calories online or ask your dietitian for help.  · Use your calories on foods and drinks that will fill you up, and not on foods and drinks that will leave you hungry.  · Use smaller plates, glasses, and bowls to prevent overeating.  This information is not intended to replace advice given to you by your health care provider. Make sure you discuss any questions you have with your health care provider.  Document Released: 12/18/2006 Document Revised: 11/17/2017 Document Reviewed: 11/17/2017  [employer] Interactive Patient Education © 2018 [employer]  BMI for Adults  Body mass index (BMI) is a number that is calculated from a person's weight and height. In most adults, the number is used to find how much of an adult's weight is made up of fat. BMI is not as accurate as a direct measure of body fat.  How is BMI calculated?  BMI is calculated by dividing weight in kilograms by height in meters squared. It can also be calculated by dividing weight in pounds by height in inches squared, then multiplying the resulting number by 703. Charts are available to help you find your BMI quickly and easily without doing this calculation.  How is BMI interpreted?  Health care  professionals use BMI charts to identify whether an adult is underweight, at a normal weight, or overweight based on the following guidelines:  · Underweight: BMI less than 18.5.  · Normal weight: BMI between 18.5 and 24.9.  · Overweight: BMI between 25 and 29.9.  · Obese: BMI of 30 and above.    BMI is usually interpreted the same for males and females.  Weight includes both fat and muscle, so someone with a muscular build, such as an athlete, may have a BMI that is higher than 24.9. In cases like these, BMI may not accurately depict body fat. To determine if excess body fat is the cause of a BMI of 25 or higher, further assessments may need to be done by a health care provider.  Why is BMI a useful tool?  BMI is used to identify a possible weight problem that may be related to a medical problem or may increase the risk for medical problems. BMI can also be used to promote changes to reach a healthy weight.  This information is not intended to replace advice given to you by your health care provider. Make sure you discuss any questions you have with your health care provider.  Document Released: 08/29/2005 Document Revised: 04/27/2017 Document Reviewed: 05/15/2015  [employer] Interactive Patient Education © 2018 [employer]

## 2018-07-27 NOTE — PROGRESS NOTES
Subjective   Ann-Marie Eaton is a 58 y.o. female.     Chief Complaint   Patient presents with   • Annual Exam     mammo scheduled 8/8/18         In for annual preventative exam.  Sleeps 6-7 hours per night.  Exercises about one day per week.  Energy is good.  Diet is so-so.  Still on a soft diet.         The following portions of the patient's history were reviewed and updated as appropriate: allergies, current medications, past social history and problem list.    HISTORY  Outpatient Prescriptions Marked as Taking for the 7/27/18 encounter (Office Visit) with John Cowan MD   Medication Sig Dispense Refill   • aspirin 81 MG chewable tablet Chew 81 mg Daily.     • atorvastatin (LIPITOR) 20 MG tablet Take 1 tablet by mouth Daily. 90 tablet 1   • diltiaZEM CD (CARTIA XT) 240 MG 24 hr capsule Take 1 capsule by mouth Daily. 90 capsule 1   • ezetimibe (ZETIA) 10 MG tablet Take 1 tablet by mouth Daily. 90 tablet 1   • lisinopril (PRINIVIL,ZESTRIL) 10 MG tablet Take 1 tablet by mouth Daily. 90 tablet 1   • loperamide (IMODIUM) 2 MG capsule Take 2 mg by mouth as needed for diarrhea.     • Naproxen (NAPROSYN PO) Take 220 mg by mouth Daily.     • Probiotic Product (PROBIOTIC DAILY PO) Take 1 tablet by mouth Daily. Taking while on antibiotic therapy.      • raNITIdine (ZANTAC) 150 MG tablet Take 1 tablet by mouth Every Night. 90 tablet 1   • sertraline (ZOLOFT) 100 MG tablet Take 1.5 tablets by mouth Daily. (Patient taking differently: Take 100 mg by mouth Daily.) 135 tablet 1   • valACYclovir (VALTREX) 500 MG tablet Take 1 tablet by mouth Daily. 90 tablet 1   • vitamin D (ERGOCALCIFEROL) 41160 units capsule capsule Take 1 capsule by mouth 1 (One) Time Per Week. 13 capsule 1     Social History     Social History   • Marital status:      Spouse name: N/A   • Number of children: N/A   • Years of education: N/A     Occupational History   • Not on file.     Social History Main Topics   • Smoking status: Former Smoker      Packs/day: 1.00     Years: 15.00     Types: Cigarettes     Quit date: 2006   • Smokeless tobacco: Never Used   • Alcohol use Yes      Comment: occasionaly    • Drug use: No   • Sexual activity: Defer     Other Topics Concern   • Not on file     Social History Narrative   • No narrative on file     Family History   Problem Relation Age of Onset   • Lung cancer Mother    • Aneurysm Father    • COPD Father    • No Known Problems Brother    • No Known Problems Daughter    • No Known Problems Sister      Past Medical History:   Diagnosis Date   • Anxiety    • DDD (degenerative disc disease), lumbar    • Fatty liver disease, nonalcoholic    • GERD (gastroesophageal reflux disease)    • HLD (hyperlipidemia)    • HTN (hypertension)    • IBS (irritable bowel syndrome)    • SHANNAN (obstructive sleep apnea)    • Osteoarthritis of right knee    • Urolithiasis    • Vitamin D deficiency      Past Surgical History:   Procedure Laterality Date   • APPENDECTOMY     • CHOLECYSTECTOMY     • CYSTOSCOPY W/ LASER LITHOTRIPSY  04/2014   • [place] TOTAL KNEE ARTHROPLASTY Left 4/18/2016    Procedure: LT TOTAL KNEE ARTHROPLASTY;  Surgeon: Cam Ryan MD;  Location: [facility];  Service: Orthopedics   • TONSILLECTOMY     • TOTAL KNEE ARTHROPLASTY     • URETEROTOMY  03/2014    R Stent- stone       Review of Systems   Constitutional: Negative for appetite change, chills, fatigue and fever.   HENT: Negative for congestion, ear pain, hearing loss, nosebleeds, postnasal drip, rhinorrhea, sinus pressure and trouble swallowing.    Eyes: Negative for pain, itching and visual disturbance.   Respiratory: Negative for cough, chest tightness, shortness of breath and wheezing.    Cardiovascular: Negative for chest pain, palpitations and leg swelling.   Gastrointestinal: Positive for abdominal pain (GERD), constipation and diarrhea. Negative for anal bleeding, nausea, rectal pain and vomiting.   Endocrine: Negative for cold intolerance, heat intolerance  and polyuria.   Genitourinary: Negative for difficulty urinating, dysuria, flank pain, frequency, hematuria and urgency.   Musculoskeletal: Positive for arthralgias (right knee). Negative for back pain and myalgias.   Skin: Negative for rash.   Allergic/Immunologic: Positive for environmental allergies.   Neurological: Negative for dizziness, syncope, speech difficulty, weakness, light-headedness, numbness and headaches.   Hematological: Bruises/bleeds easily.   Psychiatric/Behavioral: Positive for dysphoric mood. Negative for agitation, confusion and sleep disturbance. The patient is not nervous/anxious.        Objective   Vitals:    07/27/18 0919   BP: 130/80   Pulse: 81   Resp: 16   Temp: 98.6 °F (37 °C)   SpO2: 97%      1    07/27/18 0919   Weight: 109 kg (240 lb 9.6 oz)    [unfilled]  Body mass index is 42.63 kg/m².      Physical Exam   Constitutional: She is oriented to person, place, and time. She appears well-developed and well-nourished.   HENT:   Head: Normocephalic and atraumatic.   Right Ear: External ear normal.   Left Ear: External ear normal.   Nose: Nose normal.   Mouth/Throat: Oropharynx is clear and moist.   Eyes: Pupils are equal, round, and reactive to light. Conjunctivae and EOM are normal.   Neck: Normal range of motion. Neck supple. No JVD present. No thyromegaly present.   Cardiovascular: Normal rate, regular rhythm, normal heart sounds and intact distal pulses.  Exam reveals no gallop.    No murmur heard.  Pulmonary/Chest: Effort normal and breath sounds normal. No respiratory distress. She has no wheezes. She has no rales.   Abdominal: Soft. Bowel sounds are normal. She exhibits no distension and no mass. There is no tenderness. There is no guarding. No hernia.   Musculoskeletal: Normal range of motion. She exhibits no edema.   Lymphadenopathy:     She has no cervical adenopathy.   Neurological: She is alert and oriented to person, place, and time. She displays normal reflexes. No  cranial nerve deficit. Coordination normal.   Skin: Skin is warm and dry.   Psychiatric: She has a normal mood and affect. Her behavior is normal. Judgment and thought content normal.   Nursing note and vitals reviewed.        Problem List Items Addressed This Visit        Cardiovascular and Mediastinum    Hypertension    Hyperlipidemia       Digestive    IBS (irritable bowel syndrome)    GERD (gastroesophageal reflux disease)      Other Visit Diagnoses     Encounter for preventive health examination    -  Primary        Assessment/Plan   In for annual preventative exam and recheck of htn, lipids, IBS, and Gerd.  Has diarrhea predominant irritable bowel which is doing pretty well overall.  Takes Imodium when necessary.  For now she is improved on probiotics.  Annual labs 5/18.  Lipids and hepatic q 3 mos.  Sertraline dose is back to 150 mg daily.  Depression is better now.  Best I have seen her in several years.  Due for Shingrix.  Mammogram is scheduled in about 2 weeks.         Dragon disclaimer:   Much of this encounter note is an electronic transcription/translation of spoken language to printed text. The electronic translation of spoken language may permit erroneous, or at times, nonsensical words or phrases to be inadvertently transcribed; Although I have reviewed the note for such errors, some may still exist.

## 2018-08-15 ENCOUNTER — HOSPITAL ENCOUNTER (OUTPATIENT)
Dept: MAMMOGRAPHY | Facility: HOSPITAL | Age: 58
Discharge: HOME | End: 2018-08-15
Attending: INTERNAL MEDICINE | Admitting: INTERNAL MEDICINE

## 2018-08-15 DIAGNOSIS — Z13.9 VISIT FOR SCREENING: ICD-10-CM

## 2018-08-15 PROCEDURE — 77063 BREAST TOMOSYNTHESIS BI: CPT

## 2018-08-15 PROCEDURE — 77067 SCR MAMMO BI INCL CAD: CPT

## 2018-09-04 RX ORDER — SERTRALINE HYDROCHLORIDE 100 MG/1
TABLET, FILM COATED ORAL
Qty: 135 TABLET | Refills: 0 | Status: SHIPPED | OUTPATIENT
Start: 2018-09-04 | End: 2018-11-29 | Stop reason: SDUPTHER

## 2018-09-04 RX ORDER — ERGOCALCIFEROL 1.25 MG/1
CAPSULE ORAL
Qty: 13 CAPSULE | Refills: 0 | Status: SHIPPED | OUTPATIENT
Start: 2018-09-04 | End: 2018-11-29 | Stop reason: SDUPTHER

## 2018-09-04 RX ORDER — LISINOPRIL 10 MG/1
10 TABLET ORAL DAILY
Qty: 90 TABLET | Refills: 0 | Status: SHIPPED | OUTPATIENT
Start: 2018-09-04 | End: 2018-11-29 | Stop reason: SDUPTHER

## 2018-09-04 RX ORDER — EZETIMIBE 10 MG/1
10 TABLET ORAL DAILY
Qty: 90 TABLET | Refills: 0 | Status: SHIPPED | OUTPATIENT
Start: 2018-09-04 | End: 2018-11-29 | Stop reason: SDUPTHER

## 2018-09-04 RX ORDER — DILTIAZEM HYDROCHLORIDE 240 MG/1
240 CAPSULE, EXTENDED RELEASE ORAL DAILY
Qty: 90 CAPSULE | Refills: 0 | Status: SHIPPED | OUTPATIENT
Start: 2018-09-04 | End: 2018-11-29 | Stop reason: SDUPTHER

## 2018-09-04 RX ORDER — ATORVASTATIN CALCIUM 20 MG/1
20 TABLET, FILM COATED ORAL DAILY
Qty: 90 TABLET | Refills: 0 | Status: SHIPPED | OUTPATIENT
Start: 2018-09-04 | End: 2018-11-29 | Stop reason: SDUPTHER

## 2018-10-29 ENCOUNTER — OFFICE VISIT (OUTPATIENT)
Dept: INTERNAL MEDICINE | Facility: CLINIC | Age: 58
End: 2018-10-29

## 2018-10-29 VITALS
WEIGHT: 252 LBS | HEIGHT: 63 IN | DIASTOLIC BLOOD PRESSURE: 74 MMHG | HEART RATE: 74 BPM | SYSTOLIC BLOOD PRESSURE: 128 MMHG | OXYGEN SATURATION: 95 % | TEMPERATURE: 98.3 F | BODY MASS INDEX: 44.65 KG/M2 | RESPIRATION RATE: 16 BRPM

## 2018-10-29 DIAGNOSIS — K21.9 GASTROESOPHAGEAL REFLUX DISEASE, ESOPHAGITIS PRESENCE NOT SPECIFIED: ICD-10-CM

## 2018-10-29 DIAGNOSIS — F41.1 ANXIETY, GENERALIZED: ICD-10-CM

## 2018-10-29 DIAGNOSIS — Z23 NEED FOR IMMUNIZATION AGAINST INFLUENZA: Primary | ICD-10-CM

## 2018-10-29 DIAGNOSIS — K58.0 IRRITABLE BOWEL SYNDROME WITH DIARRHEA: ICD-10-CM

## 2018-10-29 DIAGNOSIS — G47.33 OSA (OBSTRUCTIVE SLEEP APNEA): ICD-10-CM

## 2018-10-29 DIAGNOSIS — I10 ESSENTIAL HYPERTENSION: ICD-10-CM

## 2018-10-29 DIAGNOSIS — E78.5 HYPERLIPIDEMIA, UNSPECIFIED HYPERLIPIDEMIA TYPE: ICD-10-CM

## 2018-10-29 LAB
CHOLEST SERPL-MCNC: 144 MG/DL (ref 0–200)
HDLC SERPL-MCNC: 56 MG/DL (ref 40–60)
LDLC SERPL CALC-MCNC: 74 MG/DL (ref 0–100)
TRIGL SERPL-MCNC: 72 MG/DL (ref 0–150)
VLDLC SERPL CALC-MCNC: 14.4 MG/DL (ref 5–40)

## 2018-10-29 PROCEDURE — 90674 CCIIV4 VAC NO PRSV 0.5 ML IM: CPT | Performed by: INTERNAL MEDICINE

## 2018-10-29 PROCEDURE — 99214 OFFICE O/P EST MOD 30 MIN: CPT | Performed by: INTERNAL MEDICINE

## 2018-10-29 PROCEDURE — 90471 IMMUNIZATION ADMIN: CPT | Performed by: INTERNAL MEDICINE

## 2018-10-29 NOTE — PATIENT INSTRUCTIONS
Influenza Virus Vaccine injection  What is this medicine?  INFLUENZA VIRUS VACCINE (in [facility] SE[facility]) helps to reduce the risk of getting influenza also known as the flu. The vaccine only helps protect you against some strains of the flu.  This medicine may be used for other purposes; ask your health care provider or pharmacist if you have questions.  COMMON BRAND NAME(S): Afluria, Agriflu, Alfuria, FLUAD, Fluarix, Fluarix Quadrivalent, Flublok, Flublok Quadrivalent, FLUCELVAX, Flulaval, Fluvirin, Fluzone, Fluzone High-Dose, Fluzone Intradermal  What should I tell my health care provider before I take this medicine?  They need to know if you have any of these conditions:  -bleeding disorder like hemophilia  -fever or infection  -Guillain-Crosby syndrome or other neurological problems  -immune system problems  -infection with the human immunodeficiency virus (HIV) or AIDS  -low blood platelet counts  -multiple sclerosis  -an unusual or allergic reaction to influenza virus vaccine, latex, other medicines, foods, dyes, or preservatives. Different brands of vaccines contain different allergens. Some may contain latex or eggs. Talk to your doctor about your allergies to make sure that you get the right vaccine.  -pregnant or trying to get pregnant  -breast-feeding  How should I use this medicine?  This vaccine is for injection into a muscle or under the skin. It is given by a health care professional.  A copy of Vaccine Information Statements will be given before each vaccination. Read this sheet carefully each time. The sheet may change frequently.  Talk to your healthcare provider to see which vaccines are right for you. Some vaccines should not be used in all age groups.  Overdosage: If you think you have taken too much of this medicine contact a poison control center or emergency room at once.  NOTE: This medicine is only for you. Do not share this medicine with others.  What if I miss a dose?  This  does not apply.  What may interact with this medicine?  -chemotherapy or radiation therapy  -medicines that lower your immune system like etanercept, anakinra, infliximab, and adalimumab  -medicines that treat or prevent blood clots like warfarin  -phenytoin  -steroid medicines like prednisone or cortisone  -theophylline  -vaccines  This list may not describe all possible interactions. Give your health care provider a list of all the medicines, herbs, non-prescription drugs, or dietary supplements you use. Also tell them if you smoke, drink alcohol, or use illegal drugs. Some items may interact with your medicine.  What should I watch for while using this medicine?  Report any side effects that do not go away within 3 days to your doctor or health care professional. Call your health care provider if any unusual symptoms occur within 6 weeks of receiving this vaccine.  You may still catch the flu, but the illness is not usually as bad. You cannot get the flu from the vaccine. The vaccine will not protect against colds or other illnesses that may cause fever. The vaccine is needed every year.  What side effects may I notice from receiving this medicine?  Side effects that you should report to your doctor or health care professional as soon as possible:  -allergic reactions like skin rash, itching or hives, swelling of the face, lips, or tongue  Side effects that usually do not require medical attention (report to your doctor or health care professional if they continue or are bothersome):  -fever  -headache  -muscle aches and pains  -pain, tenderness, redness, or swelling at the injection site  -tiredness  This list may not describe all possible side effects. Call your doctor for medical advice about side effects. You may report side effects to FDA at 2-902-FDA-5043.  Where should I keep my medicine?  The vaccine will be given by a health care professional in a clinic, pharmacy, doctor's office, or other health care  setting. You will not be given vaccine doses to store at home.  NOTE: This sheet is a summary. It may not cover all possible information. If you have questions about this medicine, talk to your doctor, pharmacist, or health care provider.  © 2018 [employer]vier/Gold Standard (2016-07-08 10:07:28)

## 2018-10-29 NOTE — PROGRESS NOTES
Subjective   Ann-Marie Eaton is a 58 y.o. female.     Chief Complaint   Patient presents with   • Hypertension   • Hyperlipidemia   • Irritable Bowel Syndrome         Hypertension   This is a chronic problem. The current episode started more than 1 year ago. The problem is unchanged. The problem is controlled. Associated symptoms include peripheral edema. Pertinent negatives include no chest pain, headaches, orthopnea, palpitations or shortness of breath. There are no associated agents to hypertension. Risk factors for coronary artery disease include dyslipidemia, post-menopausal state, stress, sedentary lifestyle and smoking/tobacco exposure. Current antihypertension treatment includes ACE inhibitors and calcium channel blockers. The current treatment provides significant improvement. There are no compliance problems.  There is no history of angina, kidney disease, CAD/MI, CVA or heart failure.   Hyperlipidemia   This is a chronic problem. The current episode started more than 1 year ago. The problem is controlled. Recent lipid tests were reviewed and are normal. Pertinent negatives include no chest pain or shortness of breath. Current antihyperlipidemic treatment includes statins. The current treatment provides significant improvement of lipids. There are no compliance problems.  Risk factors for coronary artery disease include dyslipidemia, obesity, stress, post-menopausal and a sedentary lifestyle.   Irritable Bowel Syndrome   Pertinent negatives include no abdominal pain, chest pain, chills, coughing, fatigue, fever, headaches, nausea or vomiting.   Flank Pain   This is a new problem. The current episode started today. The problem occurs constantly. The problem is unchanged. The pain is present in the lumbar spine. The quality of the pain is described as stabbing and shooting. The pain does not radiate. The pain is severe. Pertinent negatives include no abdominal pain, chest pain, fever or headaches.   Heartburn    She reports no abdominal pain, no chest pain, no coughing, no nausea or no wheezing. This is a chronic problem. The current episode started more than 1 year ago. The problem occurs occasionally. The problem has been unchanged. Pertinent negatives include no fatigue. She has tried a histamine-2 antagonist for the symptoms. The treatment provided significant relief.        The following portions of the patient's history were reviewed and updated as appropriate: allergies, current medications, past social history and problem list.    Outpatient Prescriptions Marked as Taking for the 10/29/18 encounter (Office Visit) with John Cowan MD   Medication Sig Dispense Refill   • aspirin 81 MG chewable tablet Chew 81 mg Daily.     • atorvastatin (LIPITOR) 20 MG tablet TAKE 1 TABLET BY MOUTH DAILY 90 tablet 0   • CARTIA  MG 24 hr capsule TAKE 1 CAPSULE BY MOUTH DAILY 90 capsule 0   • dicyclomine (BENTYL) 10 MG capsule Take 10 mg by mouth As Needed.     • ezetimibe (ZETIA) 10 MG tablet TAKE 1 TABLET BY MOUTH DAILY 90 tablet 0   • lisinopril (PRINIVIL,ZESTRIL) 10 MG tablet TAKE 1 TABLET BY MOUTH DAILY 90 tablet 0   • loperamide (IMODIUM) 2 MG capsule Take 2 mg by mouth as needed for diarrhea.     • Naproxen (NAPROSYN PO) Take 220 mg by mouth Daily.     • Probiotic Product (PROBIOTIC DAILY PO) Take 1 tablet by mouth Daily. Taking while on antibiotic therapy.      • raNITIdine (ZANTAC) 150 MG tablet Take 1 tablet by mouth Every Night. 90 tablet 1   • sertraline (ZOLOFT) 100 MG tablet TAKE 1 AND 1/2 TABLETS BY MOUTH DAILY 135 tablet 0   • valACYclovir (VALTREX) 500 MG tablet Take 1 tablet by mouth Daily. 90 tablet 1   • vitamin D (ERGOCALCIFEROL) 30310 units capsule capsule TAKE 1 CAPSULE BY MOUTH ONCE A WEEK 13 capsule 0       Review of Systems   Constitutional: Negative for chills, fatigue and fever.   Respiratory: Negative for cough, shortness of breath and wheezing.    Cardiovascular: Negative for chest pain,  palpitations, orthopnea and leg swelling.   Gastrointestinal: Positive for diarrhea. Negative for abdominal pain, constipation, nausea and vomiting.   Genitourinary: Positive for flank pain.   Neurological: Negative for headaches.       Objective   Vitals:    10/29/18 1021   BP: 128/74   Pulse: 74   Resp: 16   Temp: 98.3 °F (36.8 °C)   SpO2: 95%      1    10/29/18  1021   Weight: 114 kg (252 lb)    [unfilled]  Body mass index is 44.65 kg/m².      Physical Exam   Constitutional: She appears well-developed and well-nourished. No distress.   HENT:   Head: Normocephalic and atraumatic.   Neck: Carotid bruit is not present. No thyromegaly present.   Cardiovascular: Normal rate, regular rhythm, normal heart sounds and intact distal pulses.  Exam reveals no gallop.    No murmur heard.  Pulmonary/Chest: Effort normal and breath sounds normal. No respiratory distress. She has no wheezes. She has no rales.   Abdominal: Soft. Bowel sounds are normal. She exhibits no mass. There is no tenderness. There is no guarding.         Problem List Items Addressed This Visit        Cardiovascular and Mediastinum    Hypertension    Hyperlipidemia       Respiratory    SHANNAN (obstructive sleep apnea)       Digestive    IBS (irritable bowel syndrome)    GERD (gastroesophageal reflux disease)       Other    Anxiety, generalized      Other Visit Diagnoses     Need for immunization against influenza    -  Primary    Relevant Orders    Flucelvax Quad=>4Years (PFS) (Completed)        Assessment/Plan   In for recheck of htn, lipids, IBS, and Gerd.  Has diarrhea predominant irritable bowel which is doing pretty well overall.  Takes Imodium when necessary.  For now she is off of probiotics.  Annual labs 6/18.  Lipids q 3 mos.  Sertraline dose is back to 150 mg daily.  Depression is better now.  Due for Shingrix.         Shree disclaimer:   Much of this encounter note is an electronic transcription/translation of spoken language to printed text.  The electronic translation of spoken language may permit erroneous, or at times, nonsensical words or phrases to be inadvertently transcribed; Although I have reviewed the note for such errors, some may still exist.

## 2018-11-29 RX ORDER — EZETIMIBE 10 MG/1
10 TABLET ORAL DAILY
Qty: 90 TABLET | Refills: 0 | Status: SHIPPED | OUTPATIENT
Start: 2018-11-29 | End: 2019-02-24 | Stop reason: SDUPTHER

## 2018-11-29 RX ORDER — ERGOCALCIFEROL 1.25 MG/1
CAPSULE ORAL
Qty: 13 CAPSULE | Refills: 0 | Status: SHIPPED | OUTPATIENT
Start: 2018-11-29 | End: 2019-02-24 | Stop reason: SDUPTHER

## 2018-11-29 RX ORDER — SERTRALINE HYDROCHLORIDE 100 MG/1
TABLET, FILM COATED ORAL
Qty: 135 TABLET | Refills: 0 | Status: SHIPPED | OUTPATIENT
Start: 2018-11-29 | End: 2019-02-24 | Stop reason: SDUPTHER

## 2018-11-29 RX ORDER — ATORVASTATIN CALCIUM 20 MG/1
20 TABLET, FILM COATED ORAL DAILY
Qty: 90 TABLET | Refills: 0 | Status: SHIPPED | OUTPATIENT
Start: 2018-11-29 | End: 2019-02-24 | Stop reason: SDUPTHER

## 2018-11-29 RX ORDER — DILTIAZEM HYDROCHLORIDE 240 MG/1
240 CAPSULE, EXTENDED RELEASE ORAL DAILY
Qty: 90 CAPSULE | Refills: 0 | Status: SHIPPED | OUTPATIENT
Start: 2018-11-29 | End: 2019-02-24 | Stop reason: SDUPTHER

## 2018-11-29 RX ORDER — LISINOPRIL 10 MG/1
10 TABLET ORAL DAILY
Qty: 90 TABLET | Refills: 0 | Status: SHIPPED | OUTPATIENT
Start: 2018-11-29 | End: 2019-02-24 | Stop reason: SDUPTHER

## 2018-12-17 RX ORDER — VALACYCLOVIR HYDROCHLORIDE 500 MG/1
500 TABLET, FILM COATED ORAL DAILY
Qty: 90 TABLET | Refills: 0 | Status: SHIPPED | OUTPATIENT
Start: 2018-12-17 | End: 2019-03-10 | Stop reason: SDUPTHER

## 2018-12-18 ENCOUNTER — OFFICE VISIT (OUTPATIENT)
Dept: INTERNAL MEDICINE | Facility: CLINIC | Age: 58
End: 2018-12-18

## 2018-12-18 VITALS
HEART RATE: 77 BPM | BODY MASS INDEX: 46.21 KG/M2 | RESPIRATION RATE: 16 BRPM | OXYGEN SATURATION: 95 % | WEIGHT: 260.8 LBS | HEIGHT: 63 IN | SYSTOLIC BLOOD PRESSURE: 128 MMHG | DIASTOLIC BLOOD PRESSURE: 82 MMHG | TEMPERATURE: 98.6 F

## 2018-12-18 DIAGNOSIS — J01.00 ACUTE NON-RECURRENT MAXILLARY SINUSITIS: Primary | ICD-10-CM

## 2018-12-18 PROCEDURE — 99213 OFFICE O/P EST LOW 20 MIN: CPT | Performed by: INTERNAL MEDICINE

## 2018-12-18 RX ORDER — DIAZEPAM 5 MG/1
TABLET ORAL
Refills: 0 | COMMUNITY
Start: 2018-11-02 | End: 2019-08-08

## 2018-12-18 RX ORDER — AZITHROMYCIN 250 MG/1
TABLET, FILM COATED ORAL
Qty: 6 TABLET | Refills: 0 | Status: SHIPPED | OUTPATIENT
Start: 2018-12-18 | End: 2019-01-24

## 2018-12-18 NOTE — PROGRESS NOTES
Subjective   Ann-Marie Eaton is a 58 y.o. female.     Chief Complaint   Patient presents with   • Sinusitis         Sinusitis   This is a new problem. The current episode started in the past 7 days. The problem is unchanged. There has been no fever. Associated symptoms include congestion, coughing, headaches, sneezing and a sore throat. Pertinent negatives include no shortness of breath. Past treatments include acetaminophen. The treatment provided mild relief.        The following portions of the patient's history were reviewed and updated as appropriate: allergies, current medications, past social history and problem list.    Outpatient Medications Marked as Taking for the 12/18/18 encounter (Office Visit) with John Cowan MD   Medication Sig Dispense Refill   • aspirin 81 MG chewable tablet Chew 81 mg Daily.     • atorvastatin (LIPITOR) 20 MG tablet TAKE 1 TABLET BY MOUTH DAILY 90 tablet 0   • CARTIA  MG 24 hr capsule TAKE 1 CAPSULE BY MOUTH DAILY 90 capsule 0   • diazePAM (VALIUM) 5 MG tablet TK 1 T PO 30 MINUTES BEFORE BEDTIME  0   • dicyclomine (BENTYL) 10 MG capsule Take 10 mg by mouth As Needed.     • ezetimibe (ZETIA) 10 MG tablet TAKE 1 TABLET BY MOUTH DAILY 90 tablet 0   • lisinopril (PRINIVIL,ZESTRIL) 10 MG tablet TAKE 1 TABLET BY MOUTH DAILY 90 tablet 0   • loperamide (IMODIUM) 2 MG capsule Take 2 mg by mouth as needed for diarrhea.     • Naproxen (NAPROSYN PO) Take 220 mg by mouth Daily.     • Probiotic Product (PROBIOTIC DAILY PO) Take 1 tablet by mouth Daily. Taking while on antibiotic therapy.      • raNITIdine (ZANTAC) 150 MG tablet Take 1 tablet by mouth Every Night. 90 tablet 1   • sertraline (ZOLOFT) 100 MG tablet TAKE 1 AND 1/2 TABLETS BY MOUTH DAILY 135 tablet 0   • valACYclovir (VALTREX) 500 MG tablet TAKE 1 TABLET BY MOUTH DAILY 90 tablet 0   • vitamin D (ERGOCALCIFEROL) 45014 units capsule capsule TAKE 1 CAPSULE BY MOUTH ONCE A WEEK 13 capsule 0       Review of Systems   HENT:  Positive for congestion, sneezing and sore throat.    Respiratory: Positive for cough. Negative for shortness of breath.    Neurological: Positive for headaches.       Objective   Vitals:    12/18/18 1319   BP: 128/82   Pulse: 77   Resp: 16   Temp: 98.6 °F (37 °C)   SpO2: 95%          12/18/18  1319   Weight: 118 kg (260 lb 12.8 oz)    [unfilled]  Body mass index is 46.21 kg/m².      Physical Exam   Constitutional: She appears well-developed and well-nourished.   HENT:   Head: Normocephalic and atraumatic.   Right Ear: External ear normal.   Left Ear: External ear normal.   Nose: Nose normal.   Mouth/Throat: Oropharynx is clear and moist.   Eyes: Conjunctivae are normal. Pupils are equal, round, and reactive to light.   Pulmonary/Chest: Effort normal and breath sounds normal. No respiratory distress. She has no wheezes. She has no rales.   Skin: Skin is warm and dry.         Problem List Items Addressed This Visit     None      Visit Diagnoses     Acute non-recurrent maxillary sinusitis    -  Primary        Assessment/Plan   In with 3-4 day illness.  Head congestion.  Facial ache.  Slight sore throat.  Sneezing and cough.  Exam is unremarkable.  Likely sinusitis.  We'll begin on a Z-Paul.  She's got multiple drug allergies.  Continue with OTCs.           .[email or web address]  Dragon disclaimer:   Much of this encounter note is an electronic transcription/translation of spoken language to printed text. The electronic translation of spoken language may permit erroneous, or at times, nonsensical words or phrases to be inadvertently transcribed; Although I have reviewed the note for such errors, some may still exist.

## 2019-01-24 ENCOUNTER — OFFICE VISIT (OUTPATIENT)
Dept: INTERNAL MEDICINE | Facility: CLINIC | Age: 59
End: 2019-01-24

## 2019-01-24 VITALS
SYSTOLIC BLOOD PRESSURE: 120 MMHG | WEIGHT: 254 LBS | BODY MASS INDEX: 45 KG/M2 | OXYGEN SATURATION: 98 % | DIASTOLIC BLOOD PRESSURE: 74 MMHG | HEIGHT: 63 IN | TEMPERATURE: 98.4 F | HEART RATE: 83 BPM | RESPIRATION RATE: 16 BRPM

## 2019-01-24 DIAGNOSIS — K58.0 IRRITABLE BOWEL SYNDROME WITH DIARRHEA: ICD-10-CM

## 2019-01-24 DIAGNOSIS — M51.37 DDD (DEGENERATIVE DISC DISEASE), LUMBOSACRAL: ICD-10-CM

## 2019-01-24 DIAGNOSIS — I10 ESSENTIAL HYPERTENSION: Primary | ICD-10-CM

## 2019-01-24 DIAGNOSIS — K21.9 GASTROESOPHAGEAL REFLUX DISEASE, ESOPHAGITIS PRESENCE NOT SPECIFIED: ICD-10-CM

## 2019-01-24 DIAGNOSIS — E78.5 HYPERLIPIDEMIA, UNSPECIFIED HYPERLIPIDEMIA TYPE: ICD-10-CM

## 2019-01-24 LAB
CHOLEST SERPL-MCNC: 148 MG/DL (ref 0–200)
HDLC SERPL-MCNC: 54 MG/DL (ref 40–60)
LDLC SERPL CALC-MCNC: 79 MG/DL (ref 0–100)
TRIGL SERPL-MCNC: 76 MG/DL (ref 0–150)
VLDLC SERPL CALC-MCNC: 15.2 MG/DL (ref 5–40)

## 2019-01-24 PROCEDURE — 99214 OFFICE O/P EST MOD 30 MIN: CPT | Performed by: INTERNAL MEDICINE

## 2019-01-24 NOTE — PROGRESS NOTES
Subjective   Ann-Marie Eaton is a 59 y.o. female.     Chief Complaint   Patient presents with   • Hypertension   • Hyperlipidemia   • Irritable Bowel Syndrome         Hypertension   This is a chronic problem. The current episode started more than 1 year ago. The problem is unchanged. The problem is controlled. Associated symptoms include peripheral edema. Pertinent negatives include no chest pain, headaches, orthopnea, palpitations or shortness of breath. There are no associated agents to hypertension. Risk factors for coronary artery disease include dyslipidemia, post-menopausal state, stress, sedentary lifestyle and smoking/tobacco exposure. Current antihypertension treatment includes ACE inhibitors and calcium channel blockers. The current treatment provides significant improvement. There are no compliance problems.  There is no history of angina, kidney disease, CAD/MI, CVA or heart failure.   Hyperlipidemia   This is a chronic problem. The current episode started more than 1 year ago. The problem is controlled. Recent lipid tests were reviewed and are normal. Pertinent negatives include no chest pain or shortness of breath. Current antihyperlipidemic treatment includes statins. The current treatment provides significant improvement of lipids. There are no compliance problems.  Risk factors for coronary artery disease include dyslipidemia, obesity, stress, post-menopausal and a sedentary lifestyle.   Irritable Bowel Syndrome   This is a chronic problem. Pertinent negatives include no abdominal pain, chest pain, chills, coughing, fatigue, fever, headaches, nausea or vomiting.   Flank Pain   This is a new problem. The current episode started today. The problem occurs constantly. The problem is unchanged. The pain is present in the lumbar spine. The quality of the pain is described as stabbing and shooting. The pain does not radiate. The pain is severe. Pertinent negatives include no abdominal pain, chest pain, fever  or headaches.   Heartburn   She reports no abdominal pain, no chest pain, no coughing, no nausea or no wheezing. This is a chronic problem. The current episode started more than 1 year ago. The problem occurs occasionally. The problem has been unchanged. Pertinent negatives include no fatigue. She has tried a histamine-2 antagonist for the symptoms. The treatment provided significant relief.        The following portions of the patient's history were reviewed and updated as appropriate: allergies, current medications, past social history and problem list.    Outpatient Medications Marked as Taking for the 1/24/19 encounter (Office Visit) with John Cowan MD   Medication Sig Dispense Refill   • aspirin 81 MG chewable tablet Chew 81 mg Daily.     • atorvastatin (LIPITOR) 20 MG tablet TAKE 1 TABLET BY MOUTH DAILY 90 tablet 0   • CARTIA  MG 24 hr capsule TAKE 1 CAPSULE BY MOUTH DAILY 90 capsule 0   • diazePAM (VALIUM) 5 MG tablet TK 1 T PO 30 MINUTES BEFORE BEDTIME  0   • dicyclomine (BENTYL) 10 MG capsule Take 10 mg by mouth As Needed.     • ezetimibe (ZETIA) 10 MG tablet TAKE 1 TABLET BY MOUTH DAILY 90 tablet 0   • lisinopril (PRINIVIL,ZESTRIL) 10 MG tablet TAKE 1 TABLET BY MOUTH DAILY 90 tablet 0   • loperamide (IMODIUM) 2 MG capsule Take 2 mg by mouth as needed for diarrhea.     • Naproxen (NAPROSYN PO) Take 220 mg by mouth Daily.     • Probiotic Product (PROBIOTIC DAILY PO) Take 1 tablet by mouth Daily. Taking while on antibiotic therapy.      • raNITIdine (ZANTAC) 150 MG tablet Take 1 tablet by mouth Every Night. 90 tablet 1   • sertraline (ZOLOFT) 100 MG tablet TAKE 1 AND 1/2 TABLETS BY MOUTH DAILY 135 tablet 0   • valACYclovir (VALTREX) 500 MG tablet TAKE 1 TABLET BY MOUTH DAILY 90 tablet 0   • vitamin D (ERGOCALCIFEROL) 20802 units capsule capsule TAKE 1 CAPSULE BY MOUTH ONCE A WEEK 13 capsule 0       Review of Systems   Constitutional: Negative for chills, fatigue and fever.   Respiratory: Negative  for cough, shortness of breath and wheezing.    Cardiovascular: Negative for chest pain, palpitations, orthopnea and leg swelling.   Gastrointestinal: Positive for diarrhea. Negative for abdominal pain, constipation, nausea and vomiting.   Genitourinary: Positive for flank pain.   Neurological: Negative for headaches.       Objective   Vitals:    01/24/19 1029   BP: 120/74   Pulse: 83   Resp: 16   Temp: 98.4 °F (36.9 °C)   SpO2: 98%          01/24/19  1029   Weight: 115 kg (254 lb)    [unfilled]  Body mass index is 45.01 kg/m².      Physical Exam   Constitutional: She appears well-developed and well-nourished. No distress.   HENT:   Head: Normocephalic and atraumatic.   Neck: Carotid bruit is not present. No thyromegaly present.   Cardiovascular: Normal rate, regular rhythm, normal heart sounds and intact distal pulses. Exam reveals no gallop.   No murmur heard.  Pulmonary/Chest: Effort normal and breath sounds normal. No respiratory distress. She has no wheezes. She has no rales.   Abdominal: Soft. Bowel sounds are normal. She exhibits no mass. There is no tenderness. There is no guarding.         Problem List Items Addressed This Visit        Cardiovascular and Mediastinum    Hypertension - Primary    Hyperlipidemia       Digestive    IBS (irritable bowel syndrome)    GERD (gastroesophageal reflux disease)       Musculoskeletal and Integument    DDD (degenerative disc disease), lumbosacral        Assessment/Plan   In for recheck of htn, lipids, IBS, and Gerd.  Has diarrhea predominant irritable bowel which is doing pretty well overall.  Takes Imodium when necessary.  For now she is off of probiotics.  Annual labs 6/18.  Lipids q 3 mos.  Sertraline dose is back to 150 mg daily.  Depression is better now.  Due for Shingrix.         Dragon disclaimer:   Much of this encounter note is an electronic transcription/translation of spoken language to printed text. The electronic translation of spoken language may  permit erroneous, or at times, nonsensical words or phrases to be inadvertently transcribed; Although I have reviewed the note for such errors, some may still exist.

## 2019-02-11 ENCOUNTER — OFFICE VISIT (OUTPATIENT)
Dept: INTERNAL MEDICINE | Facility: CLINIC | Age: 59
End: 2019-02-11

## 2019-02-11 VITALS
BODY MASS INDEX: 45.01 KG/M2 | RESPIRATION RATE: 16 BRPM | HEART RATE: 96 BPM | TEMPERATURE: 98.8 F | SYSTOLIC BLOOD PRESSURE: 142 MMHG | HEIGHT: 63 IN | DIASTOLIC BLOOD PRESSURE: 82 MMHG | OXYGEN SATURATION: 95 %

## 2019-02-11 DIAGNOSIS — J06.9 VIRAL UPPER RESPIRATORY TRACT INFECTION: Primary | ICD-10-CM

## 2019-02-11 PROCEDURE — 99213 OFFICE O/P EST LOW 20 MIN: CPT | Performed by: INTERNAL MEDICINE

## 2019-02-11 RX ORDER — BENZONATATE 200 MG/1
200 CAPSULE ORAL 3 TIMES DAILY PRN
Qty: 30 CAPSULE | Refills: 0 | Status: SHIPPED | OUTPATIENT
Start: 2019-02-11 | End: 2019-05-10

## 2019-02-11 NOTE — PROGRESS NOTES
Subjective   Ann-Marie Eaton is a 59 y.o. female.     Chief Complaint   Patient presents with   • Cough     3-4 days ago, using mucinex   • Nasal Congestion         Cough   This is a new problem. The current episode started in the past 7 days. The problem has been gradually worsening. The cough is productive of sputum. Associated symptoms include chest pain, chills, nasal congestion, a sore throat and shortness of breath. Pertinent negatives include no fever, postnasal drip or rhinorrhea. Nothing aggravates the symptoms. She has tried OTC cough suppressant for the symptoms. The treatment provided no relief. There is no history of asthma, bronchiectasis, COPD or emphysema.        The following portions of the patient's history were reviewed and updated as appropriate: allergies, current medications, past social history and problem list.    Outpatient Medications Marked as Taking for the 2/11/19 encounter (Office Visit) with John Cowan MD   Medication Sig Dispense Refill   • aspirin 81 MG chewable tablet Chew 81 mg Daily.     • atorvastatin (LIPITOR) 20 MG tablet TAKE 1 TABLET BY MOUTH DAILY 90 tablet 0   • CARTIA  MG 24 hr capsule TAKE 1 CAPSULE BY MOUTH DAILY 90 capsule 0   • diazePAM (VALIUM) 5 MG tablet TK 1 T PO 30 MINUTES BEFORE BEDTIME  0   • dicyclomine (BENTYL) 10 MG capsule Take 10 mg by mouth As Needed.     • ezetimibe (ZETIA) 10 MG tablet TAKE 1 TABLET BY MOUTH DAILY 90 tablet 0   • lisinopril (PRINIVIL,ZESTRIL) 10 MG tablet TAKE 1 TABLET BY MOUTH DAILY 90 tablet 0   • loperamide (IMODIUM) 2 MG capsule Take 2 mg by mouth as needed for diarrhea.     • Naproxen (NAPROSYN PO) Take 220 mg by mouth Daily.     • Probiotic Product (PROBIOTIC DAILY PO) Take 1 tablet by mouth Daily. Taking while on antibiotic therapy.      • raNITIdine (ZANTAC) 150 MG tablet Take 1 tablet by mouth Every Night. 90 tablet 1   • sertraline (ZOLOFT) 100 MG tablet TAKE 1 AND 1/2 TABLETS BY MOUTH DAILY 135 tablet 0   •  valACYclovir (VALTREX) 500 MG tablet TAKE 1 TABLET BY MOUTH DAILY 90 tablet 0   • vitamin D (ERGOCALCIFEROL) 34355 units capsule capsule TAKE 1 CAPSULE BY MOUTH ONCE A WEEK 13 capsule 0       Review of Systems   Constitutional: Positive for chills. Negative for fever.   HENT: Positive for sore throat. Negative for postnasal drip and rhinorrhea.    Respiratory: Positive for cough and shortness of breath.    Cardiovascular: Positive for chest pain.       Objective   Vitals:    02/11/19 1312   BP: 142/82   Pulse: 96   Resp: 16   Temp: 98.8 °F (37.1 °C)   SpO2: 95%    There were no vitals filed for this visit. [unfilled]  Body mass index is 45.01 kg/m².      Physical Exam   Constitutional: She appears well-developed and well-nourished.   HENT:   Head: Normocephalic and atraumatic.   Right Ear: External ear normal.   Left Ear: External ear normal.   Nose: Nose normal.   Mouth/Throat: Oropharynx is clear and moist.   Eyes: Conjunctivae are normal. Pupils are equal, round, and reactive to light.   Pulmonary/Chest: Effort normal and breath sounds normal. No respiratory distress. She has no wheezes. She has no rales.   Skin: Skin is warm and dry.         Problem List Items Addressed This Visit     None      Visit Diagnoses     Viral upper respiratory tract infection    -  Primary        Assessment/Plan   In with 5-day illness.  Cough.  Chest congestion.  Sputum production.  She has had some head congestion.  Fullness in her forehead.  Achiness.  Chills.  She is taking Mucinex which dries her up.  I think the Mucinex is fine.  She should have some warm tea with honey.  Tessalon cough Perles as needed.           .[email or web address]  Dragon disclaimer:   Much of this encounter note is an electronic transcription/translation of spoken language to printed text. The electronic translation of spoken language may permit erroneous, or at times, nonsensical words or phrases to be inadvertently transcribed; Although I have reviewed the  note for such errors, some may still exist.

## 2019-02-15 ENCOUNTER — TELEPHONE (OUTPATIENT)
Dept: INTERNAL MEDICINE | Facility: CLINIC | Age: 59
End: 2019-02-15

## 2019-02-15 RX ORDER — AZITHROMYCIN 250 MG/1
TABLET, FILM COATED ORAL
Qty: 6 TABLET | Refills: 0 | Status: SHIPPED | OUTPATIENT
Start: 2019-02-15 | End: 2019-05-10

## 2019-02-15 NOTE — TELEPHONE ENCOUNTER
Patient was in the office for a URI on 2/11. Her sxs have not improved. Patient is c/o fatigue and her drainage is now green. Is there anything patient can take for this? Please advise.    Let us add a Z-Paul.  Let her know that it is a 5-day course of antibiotics but it stays in the body for 10 days.

## 2019-02-25 RX ORDER — EZETIMIBE 10 MG/1
10 TABLET ORAL DAILY
Qty: 90 TABLET | Refills: 0 | Status: SHIPPED | OUTPATIENT
Start: 2019-02-25 | End: 2019-05-22 | Stop reason: SDUPTHER

## 2019-02-25 RX ORDER — ATORVASTATIN CALCIUM 20 MG/1
20 TABLET, FILM COATED ORAL DAILY
Qty: 90 TABLET | Refills: 0 | Status: SHIPPED | OUTPATIENT
Start: 2019-02-25 | End: 2019-05-22 | Stop reason: SDUPTHER

## 2019-02-25 RX ORDER — SERTRALINE HYDROCHLORIDE 100 MG/1
TABLET, FILM COATED ORAL
Qty: 135 TABLET | Refills: 0 | Status: SHIPPED | OUTPATIENT
Start: 2019-02-25 | End: 2019-05-22 | Stop reason: SDUPTHER

## 2019-02-25 RX ORDER — LISINOPRIL 10 MG/1
10 TABLET ORAL DAILY
Qty: 90 TABLET | Refills: 0 | Status: SHIPPED | OUTPATIENT
Start: 2019-02-25 | End: 2019-05-22 | Stop reason: SDUPTHER

## 2019-02-25 RX ORDER — ERGOCALCIFEROL 1.25 MG/1
CAPSULE ORAL
Qty: 13 CAPSULE | Refills: 0 | Status: SHIPPED | OUTPATIENT
Start: 2019-02-25 | End: 2019-05-22 | Stop reason: SDUPTHER

## 2019-02-25 RX ORDER — DILTIAZEM HYDROCHLORIDE 240 MG/1
240 CAPSULE, EXTENDED RELEASE ORAL DAILY
Qty: 90 CAPSULE | Refills: 0 | Status: SHIPPED | OUTPATIENT
Start: 2019-02-25 | End: 2019-05-22 | Stop reason: SDUPTHER

## 2019-03-11 RX ORDER — VALACYCLOVIR HYDROCHLORIDE 500 MG/1
500 TABLET, FILM COATED ORAL DAILY
Qty: 90 TABLET | Refills: 0 | Status: SHIPPED | OUTPATIENT
Start: 2019-03-11 | End: 2019-06-02 | Stop reason: SDUPTHER

## 2019-05-10 ENCOUNTER — OFFICE VISIT (OUTPATIENT)
Dept: INTERNAL MEDICINE | Facility: CLINIC | Age: 59
End: 2019-05-10

## 2019-05-10 VITALS
HEIGHT: 63 IN | WEIGHT: 256.8 LBS | BODY MASS INDEX: 45.5 KG/M2 | TEMPERATURE: 98.1 F | DIASTOLIC BLOOD PRESSURE: 70 MMHG | OXYGEN SATURATION: 99 % | HEART RATE: 70 BPM | SYSTOLIC BLOOD PRESSURE: 118 MMHG | RESPIRATION RATE: 16 BRPM

## 2019-05-10 DIAGNOSIS — F32.A DEPRESSION, UNSPECIFIED DEPRESSION TYPE: ICD-10-CM

## 2019-05-10 DIAGNOSIS — K58.0 IRRITABLE BOWEL SYNDROME WITH DIARRHEA: ICD-10-CM

## 2019-05-10 DIAGNOSIS — G47.33 OSA (OBSTRUCTIVE SLEEP APNEA): ICD-10-CM

## 2019-05-10 DIAGNOSIS — Z79.899 MEDICATION MANAGEMENT: ICD-10-CM

## 2019-05-10 DIAGNOSIS — I10 ESSENTIAL HYPERTENSION: Primary | ICD-10-CM

## 2019-05-10 DIAGNOSIS — E78.5 HYPERLIPIDEMIA, UNSPECIFIED HYPERLIPIDEMIA TYPE: ICD-10-CM

## 2019-05-10 DIAGNOSIS — K76.0 FATTY LIVER DISEASE, NONALCOHOLIC: ICD-10-CM

## 2019-05-10 LAB
ALBUMIN SERPL-MCNC: 3.8 G/DL (ref 3.5–5.2)
ALBUMIN/GLOB SERPL: 1.4 G/DL
ALP SERPL-CCNC: 107 U/L (ref 39–117)
ALT SERPL-CCNC: 14 U/L (ref 1–33)
AST SERPL-CCNC: 12 U/L (ref 1–32)
BASOPHILS # BLD AUTO: 0.05 10*3/MM3 (ref 0–0.2)
BASOPHILS NFR BLD AUTO: 0.6 % (ref 0–1.5)
BILIRUB SERPL-MCNC: 0.6 MG/DL (ref 0.2–1.2)
BUN SERPL-MCNC: 12 MG/DL (ref 8–23)
BUN/CREAT SERPL: 22.2 (ref 7–25)
CALCIUM SERPL-MCNC: 9.6 MG/DL (ref 8.6–10.5)
CHLORIDE SERPL-SCNC: 103 MMOL/L (ref 98–107)
CHOLEST SERPL-MCNC: 138 MG/DL (ref 0–200)
CO2 SERPL-SCNC: 30 MMOL/L (ref 22–29)
CREAT SERPL-MCNC: 0.54 MG/DL (ref 0.57–1)
EOSINOPHIL # BLD AUTO: 0.19 10*3/MM3 (ref 0–0.4)
EOSINOPHIL NFR BLD AUTO: 2.2 % (ref 0.3–6.2)
ERYTHROCYTE [DISTWIDTH] IN BLOOD BY AUTOMATED COUNT: 13.2 % (ref 12.3–15.4)
GLOBULIN SER CALC-MCNC: 2.8 GM/DL
GLUCOSE SERPL-MCNC: 91 MG/DL (ref 65–99)
HCT VFR BLD AUTO: 42.3 % (ref 34–46.6)
HDLC SERPL-MCNC: 53 MG/DL (ref 40–60)
HGB BLD-MCNC: 13.5 G/DL (ref 12–15.9)
IMM GRANULOCYTES # BLD AUTO: 0.03 10*3/MM3 (ref 0–0.05)
IMM GRANULOCYTES NFR BLD AUTO: 0.3 % (ref 0–0.5)
LDLC SERPL CALC-MCNC: 70 MG/DL (ref 0–100)
LYMPHOCYTES # BLD AUTO: 1.56 10*3/MM3 (ref 0.7–3.1)
LYMPHOCYTES NFR BLD AUTO: 17.7 % (ref 19.6–45.3)
MCH RBC QN AUTO: 33.3 PG (ref 26.6–33)
MCHC RBC AUTO-ENTMCNC: 31.9 G/DL (ref 31.5–35.7)
MCV RBC AUTO: 104.4 FL (ref 79–97)
MONOCYTES # BLD AUTO: 0.74 10*3/MM3 (ref 0.1–0.9)
MONOCYTES NFR BLD AUTO: 8.4 % (ref 5–12)
NEUTROPHILS # BLD AUTO: 6.23 10*3/MM3 (ref 1.7–7)
NEUTROPHILS NFR BLD AUTO: 70.8 % (ref 42.7–76)
NRBC BLD AUTO-RTO: 0 /100 WBC (ref 0–0.2)
PLATELET # BLD AUTO: 242 10*3/MM3 (ref 140–450)
POTASSIUM SERPL-SCNC: 4.2 MMOL/L (ref 3.5–5.2)
PROT SERPL-MCNC: 6.6 G/DL (ref 6–8.5)
RBC # BLD AUTO: 4.05 10*6/MM3 (ref 3.77–5.28)
SODIUM SERPL-SCNC: 142 MMOL/L (ref 136–145)
T4 FREE SERPL-MCNC: 1.09 NG/DL (ref 0.93–1.7)
TRIGL SERPL-MCNC: 74 MG/DL (ref 0–150)
TSH SERPL DL<=0.005 MIU/L-ACNC: 1.43 MIU/ML (ref 0.27–4.2)
VLDLC SERPL CALC-MCNC: 14.8 MG/DL
WBC # BLD AUTO: 8.8 10*3/MM3 (ref 3.4–10.8)

## 2019-05-10 PROCEDURE — 99214 OFFICE O/P EST MOD 30 MIN: CPT | Performed by: INTERNAL MEDICINE

## 2019-05-10 NOTE — PROGRESS NOTES
Subjective   Ann-Marie Eaton is a 59 y.o. female.     Chief Complaint   Patient presents with   • Hyperlipidemia   • Hypertension   • Heartburn   • Irritable Bowel Syndrome         Hypertension   This is a chronic problem. The current episode started more than 1 year ago. The problem is unchanged. The problem is controlled. Associated symptoms include peripheral edema. Pertinent negatives include no chest pain, headaches, orthopnea, palpitations or shortness of breath. There are no associated agents to hypertension. Risk factors for coronary artery disease include dyslipidemia, post-menopausal state, stress, sedentary lifestyle and smoking/tobacco exposure. Current antihypertension treatment includes ACE inhibitors and calcium channel blockers. The current treatment provides significant improvement. There are no compliance problems.  There is no history of angina, kidney disease, CAD/MI, CVA or heart failure.   Hyperlipidemia   This is a chronic problem. The current episode started more than 1 year ago. The problem is controlled. Recent lipid tests were reviewed and are normal. Pertinent negatives include no chest pain or shortness of breath. Current antihyperlipidemic treatment includes statins. The current treatment provides significant improvement of lipids. There are no compliance problems.  Risk factors for coronary artery disease include dyslipidemia, obesity, stress, post-menopausal and a sedentary lifestyle.   Irritable Bowel Syndrome   This is a chronic problem. Pertinent negatives include no abdominal pain, chest pain, chills, coughing, fatigue, fever, headaches, nausea or vomiting.   Flank Pain   This is a new problem. The current episode started today. The problem occurs constantly. The problem is unchanged. The pain is present in the lumbar spine. The quality of the pain is described as stabbing and shooting. The pain does not radiate. The pain is severe. Pertinent negatives include no abdominal pain,  chest pain, fever or headaches.   Heartburn   She reports no abdominal pain, no chest pain, no coughing, no nausea or no wheezing. This is a chronic problem. The current episode started more than 1 year ago. The problem occurs occasionally. The problem has been unchanged. Pertinent negatives include no fatigue. She has tried a histamine-2 antagonist for the symptoms. The treatment provided significant relief.        The following portions of the patient's history were reviewed and updated as appropriate: allergies, current medications, past social history and problem list.    Outpatient Medications Marked as Taking for the 5/10/19 encounter (Office Visit) with John Cowan MD   Medication Sig Dispense Refill   • aspirin 81 MG chewable tablet Chew 81 mg Daily.     • atorvastatin (LIPITOR) 20 MG tablet TAKE 1 TABLET BY MOUTH DAILY 90 tablet 0   • CARTIA  MG 24 hr capsule TAKE 1 CAPSULE BY MOUTH DAILY 90 capsule 0   • diazePAM (VALIUM) 5 MG tablet TK 1 T PO 30 MINUTES BEFORE BEDTIME  0   • dicyclomine (BENTYL) 10 MG capsule Take 10 mg by mouth As Needed.     • ezetimibe (ZETIA) 10 MG tablet TAKE 1 TABLET BY MOUTH DAILY 90 tablet 0   • lisinopril (PRINIVIL,ZESTRIL) 10 MG tablet TAKE 1 TABLET BY MOUTH DAILY 90 tablet 0   • loperamide (IMODIUM) 2 MG capsule Take 2 mg by mouth as needed for diarrhea.     • Naproxen (NAPROSYN PO) Take 220 mg by mouth Daily As Needed.     • Probiotic Product (PROBIOTIC DAILY PO) Take 1 tablet by mouth Daily. Taking while on antibiotic therapy.      • raNITIdine (ZANTAC) 150 MG tablet Take 1 tablet by mouth Every Night. 90 tablet 1   • sertraline (ZOLOFT) 100 MG tablet TAKE 1 AND 1/2 TABLETS BY MOUTH DAILY 135 tablet 0   • valACYclovir (VALTREX) 500 MG tablet TAKE 1 TABLET BY MOUTH DAILY 90 tablet 0   • vitamin D (ERGOCALCIFEROL) 06098 units capsule capsule TAKE 1 CAPSULE BY MOUTH ONCE A WEEK 13 capsule 0       Review of Systems   Constitutional: Negative for chills, fatigue and  fever.   Respiratory: Negative for cough, shortness of breath and wheezing.    Cardiovascular: Negative for chest pain, palpitations, orthopnea and leg swelling.   Gastrointestinal: Positive for diarrhea. Negative for abdominal pain, constipation, nausea and vomiting.   Genitourinary: Positive for flank pain.   Neurological: Negative for headaches.       Objective   Vitals:    05/10/19 1039   BP: 118/70   Pulse: 70   Resp: 16   Temp: 98.1 °F (36.7 °C)   SpO2: 99%          05/10/19  1039   Weight: 116 kg (256 lb 12.8 oz)    [unfilled]  Body mass index is 45.5 kg/m².      Physical Exam   Constitutional: She appears well-developed and well-nourished. No distress.   HENT:   Head: Normocephalic and atraumatic.   Neck: Carotid bruit is not present. No thyromegaly present.   Cardiovascular: Normal rate, regular rhythm, normal heart sounds and intact distal pulses. Exam reveals no gallop.   No murmur heard.  Pulmonary/Chest: Effort normal and breath sounds normal. No respiratory distress. She has no wheezes. She has no rales.   Abdominal: Soft. Bowel sounds are normal. She exhibits no mass. There is no tenderness. There is no guarding.         Problem List Items Addressed This Visit        Cardiovascular and Mediastinum    Hypertension - Primary    Hyperlipidemia       Respiratory    SHANNAN (obstructive sleep apnea)       Digestive    IBS (irritable bowel syndrome)    Fatty liver disease, nonalcoholic       Other    Depression        Assessment/Plan   In for recheck of htn, lipids, IBS, and Gerd.  Has diarrhea predominant irritable bowel which is doing pretty well overall.  Takes Imodium when necessary.  For now she is off of probiotics.  Annual labs today 5/19.  Lipids q 3 mos. we will add a TSH and free T4 today.  She has been very cold lately and also tired.  Sertraline dose is back to 150 mg daily.  Depression is better now.  Due for Shingrix.         Dragon disclaimer:   Much of this encounter note is an electronic  transcription/translation of spoken language to printed text. The electronic translation of spoken language may permit erroneous, or at times, nonsensical words or phrases to be inadvertently transcribed; Although I have reviewed the note for such errors, some may still exist.

## 2019-05-22 RX ORDER — EZETIMIBE 10 MG/1
10 TABLET ORAL DAILY
Qty: 90 TABLET | Refills: 0 | Status: SHIPPED | OUTPATIENT
Start: 2019-05-22 | End: 2019-08-08

## 2019-05-22 RX ORDER — SERTRALINE HYDROCHLORIDE 100 MG/1
TABLET, FILM COATED ORAL
Qty: 135 TABLET | Refills: 0 | Status: SHIPPED | OUTPATIENT
Start: 2019-05-22 | End: 2019-08-08

## 2019-05-22 RX ORDER — ERGOCALCIFEROL 1.25 MG/1
CAPSULE ORAL
Qty: 13 CAPSULE | Refills: 0 | Status: SHIPPED | OUTPATIENT
Start: 2019-05-22 | End: 2019-08-08

## 2019-05-22 RX ORDER — ATORVASTATIN CALCIUM 20 MG/1
20 TABLET, FILM COATED ORAL DAILY
Qty: 90 TABLET | Refills: 0 | Status: SHIPPED | OUTPATIENT
Start: 2019-05-22 | End: 2019-08-08

## 2019-05-22 RX ORDER — LISINOPRIL 10 MG/1
10 TABLET ORAL DAILY
Qty: 90 TABLET | Refills: 0 | Status: SHIPPED | OUTPATIENT
Start: 2019-05-22 | End: 2019-08-08

## 2019-05-22 RX ORDER — DILTIAZEM HYDROCHLORIDE 240 MG/1
CAPSULE, COATED, EXTENDED RELEASE ORAL
Qty: 90 CAPSULE | Refills: 0 | Status: SHIPPED | OUTPATIENT
Start: 2019-05-22 | End: 2019-08-08

## 2019-06-03 RX ORDER — VALACYCLOVIR HYDROCHLORIDE 500 MG/1
500 TABLET, FILM COATED ORAL DAILY
Qty: 90 TABLET | Refills: 0 | Status: SHIPPED | OUTPATIENT
Start: 2019-06-03 | End: 2019-08-08

## 2019-08-08 ENCOUNTER — HOSPITAL ENCOUNTER (OUTPATIENT)
Dept: GENERAL RADIOLOGY | Facility: HOSPITAL | Age: 59
Discharge: HOME | End: 2019-08-08
Admitting: ORTHOPAEDIC SURGERY

## 2019-08-08 ENCOUNTER — HOSPITAL ENCOUNTER (OUTPATIENT)
Dept: GENERAL RADIOLOGY | Facility: HOSPITAL | Age: 59
Discharge: HOME | End: 2019-08-08

## 2019-08-08 ENCOUNTER — APPOINTMENT (OUTPATIENT)
Dept: PREADMISSION TESTING | Facility: HOSPITAL | Age: 59
End: 2019-08-08

## 2019-08-08 VITALS
RESPIRATION RATE: 20 BRPM | OXYGEN SATURATION: 94 % | HEIGHT: 64 IN | TEMPERATURE: 98.2 F | WEIGHT: 257 LBS | SYSTOLIC BLOOD PRESSURE: 127 MMHG | HEART RATE: 95 BPM | DIASTOLIC BLOOD PRESSURE: 83 MMHG | BODY MASS INDEX: 43.87 KG/M2

## 2019-08-08 LAB
ALBUMIN SERPL-MCNC: 4 G/DL (ref 3.5–5.2)
ALBUMIN/GLOB SERPL: 1.4 G/DL
ALP SERPL-CCNC: 104 U/L (ref 39–117)
ALT SERPL W P-5'-P-CCNC: 17 U/L (ref 1–33)
ANION GAP SERPL CALCULATED.3IONS-SCNC: 10.7 MMOL/L (ref 5–15)
APTT PPP: 28.4 SECONDS (ref 22.7–35.4)
AST SERPL-CCNC: 16 U/L (ref 1–32)
BACTERIA UR QL AUTO: ABNORMAL /HPF
BASOPHILS # BLD AUTO: 0.05 10*3/MM3 (ref 0–0.2)
BASOPHILS NFR BLD AUTO: 0.6 % (ref 0–1.5)
BILIRUB SERPL-MCNC: 0.7 MG/DL (ref 0.2–1.2)
BILIRUB UR QL STRIP: NEGATIVE
BUN BLD-MCNC: 9 MG/DL (ref 8–23)
BUN/CREAT SERPL: 15 (ref 7–25)
CALCIUM SPEC-SCNC: 9.2 MG/DL (ref 8.6–10.5)
CHLORIDE SERPL-SCNC: 105 MMOL/L (ref 98–107)
CLARITY UR: CLEAR
CO2 SERPL-SCNC: 26.3 MMOL/L (ref 22–29)
COLOR UR: YELLOW
CREAT BLD-MCNC: 0.6 MG/DL (ref 0.57–1)
DEPRECATED RDW RBC AUTO: 48.7 FL (ref 37–54)
EOSINOPHIL # BLD AUTO: 0.43 10*3/MM3 (ref 0–0.4)
EOSINOPHIL NFR BLD AUTO: 5.3 % (ref 0.3–6.2)
ERYTHROCYTE [DISTWIDTH] IN BLOOD BY AUTOMATED COUNT: 13.3 % (ref 12.3–15.4)
GFR SERPL CREATININE-BSD FRML MDRD: 102 ML/MIN/1.73
GLOBULIN UR ELPH-MCNC: 2.9 GM/DL
GLUCOSE BLD-MCNC: 111 MG/DL (ref 65–99)
GLUCOSE UR STRIP-MCNC: NEGATIVE MG/DL
HCT VFR BLD AUTO: 38.7 % (ref 34–46.6)
HGB BLD-MCNC: 13.1 G/DL (ref 12–15.9)
HGB UR QL STRIP.AUTO: NEGATIVE
HYALINE CASTS UR QL AUTO: ABNORMAL /LPF
IMM GRANULOCYTES # BLD AUTO: 0.03 10*3/MM3 (ref 0–0.05)
IMM GRANULOCYTES NFR BLD AUTO: 0.4 % (ref 0–0.5)
INR PPP: 1.05 (ref 0.9–1.1)
KETONES UR QL STRIP: NEGATIVE
LEUKOCYTE ESTERASE UR QL STRIP.AUTO: ABNORMAL
LYMPHOCYTES # BLD AUTO: 1.21 10*3/MM3 (ref 0.7–3.1)
LYMPHOCYTES NFR BLD AUTO: 14.9 % (ref 19.6–45.3)
MCH RBC QN AUTO: 33.8 PG (ref 26.6–33)
MCHC RBC AUTO-ENTMCNC: 33.9 G/DL (ref 31.5–35.7)
MCV RBC AUTO: 99.7 FL (ref 79–97)
MONOCYTES # BLD AUTO: 0.74 10*3/MM3 (ref 0.1–0.9)
MONOCYTES NFR BLD AUTO: 9.1 % (ref 5–12)
NEUTROPHILS # BLD AUTO: 5.66 10*3/MM3 (ref 1.7–7)
NEUTROPHILS NFR BLD AUTO: 69.7 % (ref 42.7–76)
NITRITE UR QL STRIP: NEGATIVE
NRBC BLD AUTO-RTO: 0 /100 WBC (ref 0–0.2)
PH UR STRIP.AUTO: <=5 [PH] (ref 5–8)
PLATELET # BLD AUTO: 215 10*3/MM3 (ref 140–450)
PMV BLD AUTO: 10.7 FL (ref 6–12)
POTASSIUM BLD-SCNC: 3.9 MMOL/L (ref 3.5–5.2)
PROT SERPL-MCNC: 6.9 G/DL (ref 6–8.5)
PROT UR QL STRIP: NEGATIVE
PROTHROMBIN TIME: 13.4 SECONDS (ref 11.7–14.2)
RBC # BLD AUTO: 3.88 10*6/MM3 (ref 3.77–5.28)
RBC # UR: ABNORMAL /HPF
REF LAB TEST METHOD: ABNORMAL
SODIUM BLD-SCNC: 142 MMOL/L (ref 136–145)
SP GR UR STRIP: 1.02 (ref 1–1.03)
SQUAMOUS #/AREA URNS HPF: ABNORMAL /HPF
UROBILINOGEN UR QL STRIP: ABNORMAL
WBC NRBC COR # BLD: 8.12 10*3/MM3 (ref 3.4–10.8)
WBC UR QL AUTO: ABNORMAL /HPF

## 2019-08-08 PROCEDURE — 85730 THROMBOPLASTIN TIME PARTIAL: CPT | Performed by: ORTHOPAEDIC SURGERY

## 2019-08-08 PROCEDURE — 85025 COMPLETE CBC W/AUTO DIFF WBC: CPT | Performed by: ORTHOPAEDIC SURGERY

## 2019-08-08 PROCEDURE — 36415 COLL VENOUS BLD VENIPUNCTURE: CPT

## 2019-08-08 PROCEDURE — 81001 URINALYSIS AUTO W/SCOPE: CPT | Performed by: ORTHOPAEDIC SURGERY

## 2019-08-08 PROCEDURE — 85610 PROTHROMBIN TIME: CPT | Performed by: ORTHOPAEDIC SURGERY

## 2019-08-08 PROCEDURE — 80053 COMPREHEN METABOLIC PANEL: CPT | Performed by: ORTHOPAEDIC SURGERY

## 2019-08-08 PROCEDURE — 73560 X-RAY EXAM OF KNEE 1 OR 2: CPT

## 2019-08-08 PROCEDURE — 93010 ELECTROCARDIOGRAM REPORT: CPT | Performed by: INTERNAL MEDICINE

## 2019-08-08 PROCEDURE — 93005 ELECTROCARDIOGRAM TRACING: CPT

## 2019-08-08 PROCEDURE — 71046 X-RAY EXAM CHEST 2 VIEWS: CPT

## 2019-08-08 RX ORDER — ATORVASTATIN CALCIUM 20 MG/1
20 TABLET, FILM COATED ORAL DAILY
COMMUNITY
End: 2019-08-19 | Stop reason: SDUPTHER

## 2019-08-08 RX ORDER — CHLORHEXIDINE GLUCONATE 500 MG/1
1 CLOTH TOPICAL
Status: ON HOLD | COMMUNITY
End: 2019-08-12

## 2019-08-08 RX ORDER — DILTIAZEM HYDROCHLORIDE 240 MG/1
240 CAPSULE, COATED, EXTENDED RELEASE ORAL EVERY MORNING
COMMUNITY
End: 2019-09-09 | Stop reason: SDUPTHER

## 2019-08-08 RX ORDER — ERGOCALCIFEROL 1.25 MG/1
50000 CAPSULE ORAL WEEKLY
COMMUNITY
End: 2019-08-19 | Stop reason: SDUPTHER

## 2019-08-08 RX ORDER — ACETAMINOPHEN 500 MG
1000 TABLET ORAL EVERY 6 HOURS PRN
COMMUNITY
End: 2020-01-09

## 2019-08-08 RX ORDER — DIAZEPAM 5 MG/1
5 TABLET ORAL DAILY PRN
COMMUNITY
End: 2020-01-09

## 2019-08-08 RX ORDER — EZETIMIBE 10 MG/1
10 TABLET ORAL DAILY
COMMUNITY
End: 2019-08-19 | Stop reason: SDUPTHER

## 2019-08-08 RX ORDER — LISINOPRIL 10 MG/1
10 TABLET ORAL EVERY MORNING
COMMUNITY
End: 2019-08-19 | Stop reason: SDUPTHER

## 2019-08-08 RX ORDER — SERTRALINE HYDROCHLORIDE 100 MG/1
150 TABLET, FILM COATED ORAL DAILY
COMMUNITY
End: 2019-08-19 | Stop reason: SDUPTHER

## 2019-08-08 RX ORDER — VALACYCLOVIR HYDROCHLORIDE 500 MG/1
500 TABLET, FILM COATED ORAL DAILY
COMMUNITY
End: 2019-09-03 | Stop reason: SDUPTHER

## 2019-08-08 ASSESSMENT — KOOS JR
KOOS JR SCORE: 17
KOOS JR SCORE: 44.905

## 2019-08-08 NOTE — DISCHARGE INSTRUCTIONS
Take the following medications the morning of surgery with a small sip of water:    CARDIZEM   Bring lisinopril bottle to surgery do not take am of surgery    General Instructions:  • Do not eat solid food after midnight the night before surgery.  • You may drink clear liquids day of surgery but must stop at least one hour before your hospital arrival time.  • It is beneficial for you to have a clear drink that contains carbohydrates the day of surgery.  We suggest a 12 to 20 ounce bottle of Gatorade or Powerade for non-diabetic patients or a 12 to 20 ounce bottle of G2 or Powerade Zero for diabetic patients. (Pediatric patients, are not advised to drink a 12 to 20 ounce carbohydrate drink)    Clear liquids are liquids you can see through.  Nothing red in color.     Plain water                               Sports drinks  Sodas                                   Gelatin (Jell-O)  Fruit juices without pulp such as white grape juice and apple juice  Popsicles that contain no fruit or yogurt  Tea or coffee (no cream or milk added)  Gatorade / Powerade  G2 / Powerade Zero    • Infants may have breast milk up to four hours before surgery.  • Infants drinking formula may drink formula up to six hours before surgery.   • Patients who avoid smoking, chewing tobacco and alcohol for 4 weeks prior to surgery have a reduced risk of post-operative complications.  Quit smoking as many days before surgery as you can.  • Do not smoke, use chewing tobacco or drink alcohol the day of surgery.   • If applicable bring your C-PAP/ BI-PAP machine.  • Bring any papers given to you in the doctor’s office.  • Wear clean comfortable clothes and socks.  • Do not wear contact lenses, false eyelashes or make-up.  Bring a case for your glasses.   • Bring crutches or walker if applicable.  • Remove all piercings.  Leave jewelry and any other valuables at home.  • Hair extensions with metal clips must be removed prior to surgery.  • The  Pre-Admission Testing nurse will instruct you to bring medications if unable to obtain an accurate list in Pre-Admission Testing.        If you were given a blood bank ID arm band remember to bring it with you the day of surgery.    Preventing a Surgical Site Infection:  • For 2 to 3 days before surgery, avoid shaving with a razor because the razor can irritate skin and make it easier to develop an infection.    • Any areas of open skin can increase the risk of a post-operative wound infection by allowing bacteria to enter and travel throughout the body.  Notify your surgeon if you have any skin wounds / rashes even if it is not near the expected surgical site.  The area will need assessed to determine if surgery should be delayed until it is healed.  • The night prior to surgery sleep in a clean bed with clean clothing.  Do not allow pets to sleep with you.  • Shower on the morning of surgery using a fresh bar of anti-bacterial soap (such as Dial) and clean washcloth.  Dry with a clean towel and dress in clean clothing.  • Ask your surgeon if you will be receiving antibiotics prior to surgery.  • Make sure you, your family, and all healthcare providers clean their hands with soap and water or an alcohol based hand  before caring for you or your wound.    Day of surgery:8/12/19   0800  Upon arrival, a Pre-op nurse and Anesthesiologist will review your health history, obtain vital signs, and answer questions you may have.  The only belongings needed at this time will be a list of your home medications and if applicable your C-PAP/BI-PAP machine.  If you are staying overnight your family can leave the rest of your belongings in the car and bring them to your room later.  A Pre-op nurse will start an IV and you may receive medication in preparation for surgery, including something to help you relax.  Your family will be able to see you in the Pre-op area.  While you are in surgery your family should notify the  waiting room  if they leave the waiting room area and provide a contact phone number.    Please be aware that surgery does come with discomfort.  We want to make every effort to control your discomfort so please discuss any uncontrolled symptoms with your nurse.   Your doctor will most likely have prescribed pain medications.      If you are going home after surgery you will receive individualized written care instructions before being discharged.  A responsible adult must drive you to and from the hospital on the day of your surgery and stay with you for 24 hours.    If you are staying overnight following surgery, you will be transported to your hospital room following the recovery period.  [facility] has all private rooms.    You have received a list of surgical assistants for your reference.  If you have any questions please call Pre-Admission Testing at [phone].  Deductibles and co-payments are collected on the day of service. Please be prepared to pay the required co-pay, deductible or deposit on the day of service as defined by your plan.    2% CHLORAHEXIDINE GLUCONATE* CLOTH  Preparing or “prepping” skin before surgery can reduce the risk of infection at the surgical site. To make the process easier, [facility] has chosen disposable cloths moistened with a rinse-free, 2% Chlorhexidine Gluconate (CHG) antiseptic solution. The steps below outline the prepping process and should be carefully followed.        Use the prep cloth on the area that is circled in the diagram             Directions Night before Surgery  1) Shower using a fresh bar of anti-bacterial soap (such as Dial) and clean washcloth.  Use a clean towel to completely dry your skin.  2) Do not use any lotions, oils or creams on your skin.  3) Open the package and remove 1 cloth, wipe your skin for 30 seconds in a circular motion.  Allow to dry for 3 minutes.  4) Repeat #3 with second cloth.  5) Do not touch  your eyes, ears, or mouth with the prep cloth.  6) Allow the wet prep solution to air dry.  7) Discard the prep cloth and wash your hands with soap and water.   8) Dress in clean bed clothes and sleep on fresh clean bed sheets.   9) You may experience some temporary itching after the prep.    Directions Day of Surgery  1) Repeat steps 1,2,3,4,5,6,7, and 9.   2) Dress in clean clothes before coming to the hospital.    BACTROBAN NASAL OINTMENT  There are many germs normally in your nose. Bactroban is an ointment that will help reduce these germs. Please follow these instructions for Bactroban use:      _1___The day before surgery in the morning  Date_8/11/19_______    _2___The day before surgery in the evening              Date_8/11/19_______    _3___The day of surgery in the morning    Date_8/12/19_______    **Squirt ½ package of Bactroban Ointment onto a cotton applicator and apply to inside of 1st nostril.  Squirt the remaining Bactroban and apply to the inside of the other nostril.    PERIDEX- ORAL:  Use only if your surgeon has ordered  Use the night before and morning of surgery - Swish, gargle, and spit - do not swallow.

## 2019-08-12 ENCOUNTER — ANESTHESIA EVENT (OUTPATIENT)
Dept: PERIOP | Facility: HOSPITAL | Age: 59
End: 2019-08-12

## 2019-08-12 ENCOUNTER — HOSPITAL ENCOUNTER (INPATIENT)
Facility: HOSPITAL | Age: 59
LOS: 3 days | Discharge: NURSING, REHAB OR LONG-TERM CARE | End: 2019-08-15
Attending: ORTHOPAEDIC SURGERY | Admitting: ORTHOPAEDIC SURGERY

## 2019-08-12 ENCOUNTER — ANESTHESIA (OUTPATIENT)
Dept: PERIOP | Facility: HOSPITAL | Age: 59
End: 2019-08-12

## 2019-08-12 ENCOUNTER — APPOINTMENT (OUTPATIENT)
Dept: GENERAL RADIOLOGY | Facility: HOSPITAL | Age: 59
End: 2019-08-12

## 2019-08-12 PROBLEM — M17.9 DJD (DEGENERATIVE JOINT DISEASE) OF KNEE: Status: ACTIVE | Noted: 2019-08-12

## 2019-08-12 PROCEDURE — 25010000002 KETOROLAC TROMETHAMINE PER 15 MG: Performed by: ORTHOPAEDIC SURGERY

## 2019-08-12 PROCEDURE — 73560 X-RAY EXAM OF KNEE 1 OR 2: CPT

## 2019-08-12 PROCEDURE — 25010000002 FENTANYL CITRATE (PF) 100 MCG/2ML SOLUTION: Performed by: NURSE ANESTHETIST, CERTIFIED REGISTERED

## 2019-08-12 PROCEDURE — 25010000002 PROPOFOL 10 MG/ML EMULSION: Performed by: NURSE ANESTHETIST, CERTIFIED REGISTERED

## 2019-08-12 PROCEDURE — 97161 PT EVAL LOW COMPLEX 20 MIN: CPT

## 2019-08-12 PROCEDURE — 99232 SBSQ HOSP IP/OBS MODERATE 35: CPT | Performed by: INTERNAL MEDICINE

## 2019-08-12 PROCEDURE — 25010000002 MIDAZOLAM PER 1 MG: Performed by: NURSE ANESTHETIST, CERTIFIED REGISTERED

## 2019-08-12 PROCEDURE — C1713 ANCHOR/SCREW BN/BN,TIS/BN: HCPCS | Performed by: ORTHOPAEDIC SURGERY

## 2019-08-12 PROCEDURE — 25010000003 CEFAZOLIN IN DEXTROSE 2-4 GM/100ML-% SOLUTION: Performed by: NURSE ANESTHETIST, CERTIFIED REGISTERED

## 2019-08-12 PROCEDURE — C9290 INJ, BUPIVACAINE LIPOSOME: HCPCS | Performed by: ORTHOPAEDIC SURGERY

## 2019-08-12 PROCEDURE — 25010000002 KETOROLAC TROMETHAMINE PER 15 MG: Performed by: NURSE ANESTHETIST, CERTIFIED REGISTERED

## 2019-08-12 PROCEDURE — 25010000003 BUPIVACAINE LIPOSOME 1.3 % SUSPENSION 20 ML VIAL: Performed by: ORTHOPAEDIC SURGERY

## 2019-08-12 PROCEDURE — 25010000002 VANCOMYCIN 10 G RECONSTITUTED SOLUTION: Performed by: ORTHOPAEDIC SURGERY

## 2019-08-12 PROCEDURE — 25010000002 DEXAMETHASONE PER 1 MG: Performed by: NURSE ANESTHETIST, CERTIFIED REGISTERED

## 2019-08-12 PROCEDURE — 25010000002 SUCCINYLCHOLINE PER 20 MG: Performed by: NURSE ANESTHETIST, CERTIFIED REGISTERED

## 2019-08-12 PROCEDURE — 0SRC0J9 REPLACEMENT OF RIGHT KNEE JOINT WITH SYNTHETIC SUBSTITUTE, CEMENTED, OPEN APPROACH: ICD-10-PCS | Performed by: ORTHOPAEDIC SURGERY

## 2019-08-12 PROCEDURE — 25010000003 CEFAZOLIN IN DEXTROSE 2-4 GM/100ML-% SOLUTION: Performed by: ORTHOPAEDIC SURGERY

## 2019-08-12 PROCEDURE — 97110 THERAPEUTIC EXERCISES: CPT

## 2019-08-12 PROCEDURE — 25010000002 MIDAZOLAM PER 1 MG: Performed by: ANESTHESIOLOGY

## 2019-08-12 PROCEDURE — C1776 JOINT DEVICE (IMPLANTABLE): HCPCS | Performed by: ORTHOPAEDIC SURGERY

## 2019-08-12 PROCEDURE — 25010000002 ONDANSETRON PER 1 MG: Performed by: NURSE ANESTHETIST, CERTIFIED REGISTERED

## 2019-08-12 DEVICE — JOURNEY II CR INSERT XLPE RIGHT                                    SIZE 3-4 11MM
Type: IMPLANTABLE DEVICE | Site: KNEE | Status: FUNCTIONAL
Brand: JOURNEY

## 2019-08-12 DEVICE — JOURNEY II CR FEMORAL OXINIUM                                    NONPOROUS RIGHT SIZE 4
Type: IMPLANTABLE DEVICE | Site: KNEE | Status: FUNCTIONAL
Brand: JOURNEY

## 2019-08-12 DEVICE — IMPLANTABLE DEVICE: Type: IMPLANTABLE DEVICE | Site: KNEE | Status: FUNCTIONAL

## 2019-08-12 DEVICE — JOURNEY 7.5 ROUND RESURF PAT 32MM STANDARD
Type: IMPLANTABLE DEVICE | Site: KNEE | Status: FUNCTIONAL
Brand: JOURNEY

## 2019-08-12 DEVICE — CMT BONE PALACOS R HI/VISC 1X40: Type: IMPLANTABLE DEVICE | Site: KNEE | Status: FUNCTIONAL

## 2019-08-12 DEVICE — JOURNEY TIBIAL BASEPLATE NONPOROUS                                    RIGHT SIZE 3
Type: IMPLANTABLE DEVICE | Site: KNEE | Status: FUNCTIONAL
Brand: JOURNEY

## 2019-08-12 RX ORDER — FAMOTIDINE 20 MG/1
20 TABLET, FILM COATED ORAL DAILY
Status: DISCONTINUED | OUTPATIENT
Start: 2019-08-12 | End: 2019-08-15 | Stop reason: HOSPADM

## 2019-08-12 RX ORDER — IPRATROPIUM BROMIDE AND ALBUTEROL SULFATE 2.5; .5 MG/3ML; MG/3ML
3 SOLUTION RESPIRATORY (INHALATION) ONCE AS NEEDED
Status: DISCONTINUED | OUTPATIENT
Start: 2019-08-12 | End: 2019-08-12 | Stop reason: HOSPADM

## 2019-08-12 RX ORDER — PROMETHAZINE HYDROCHLORIDE 25 MG/ML
12.5 INJECTION, SOLUTION INTRAMUSCULAR; INTRAVENOUS ONCE AS NEEDED
Status: DISCONTINUED | OUTPATIENT
Start: 2019-08-12 | End: 2019-08-12 | Stop reason: HOSPADM

## 2019-08-12 RX ORDER — EPHEDRINE SULFATE 50 MG/ML
5 INJECTION, SOLUTION INTRAVENOUS ONCE AS NEEDED
Status: DISCONTINUED | OUTPATIENT
Start: 2019-08-12 | End: 2019-08-12 | Stop reason: HOSPADM

## 2019-08-12 RX ORDER — HYDROCODONE BITARTRATE AND ACETAMINOPHEN 7.5; 325 MG/1; MG/1
1 TABLET ORAL ONCE AS NEEDED
Status: DISCONTINUED | OUTPATIENT
Start: 2019-08-12 | End: 2019-08-12 | Stop reason: HOSPADM

## 2019-08-12 RX ORDER — DIPHENHYDRAMINE HCL 25 MG
25 CAPSULE ORAL
Status: DISCONTINUED | OUTPATIENT
Start: 2019-08-12 | End: 2019-08-12 | Stop reason: HOSPADM

## 2019-08-12 RX ORDER — ACETAMINOPHEN 650 MG/1
650 SUPPOSITORY RECTAL EVERY 4 HOURS PRN
Status: DISCONTINUED | OUTPATIENT
Start: 2019-08-12 | End: 2019-08-15 | Stop reason: HOSPADM

## 2019-08-12 RX ORDER — KETOROLAC TROMETHAMINE 30 MG/ML
30 INJECTION, SOLUTION INTRAMUSCULAR; INTRAVENOUS EVERY 6 HOURS
Status: COMPLETED | OUTPATIENT
Start: 2019-08-12 | End: 2019-08-13

## 2019-08-12 RX ORDER — ACETAMINOPHEN 160 MG/5ML
650 SOLUTION ORAL EVERY 4 HOURS PRN
Status: DISCONTINUED | OUTPATIENT
Start: 2019-08-12 | End: 2019-08-15 | Stop reason: HOSPADM

## 2019-08-12 RX ORDER — FENTANYL CITRATE 50 UG/ML
50 INJECTION, SOLUTION INTRAMUSCULAR; INTRAVENOUS
Status: DISCONTINUED | OUTPATIENT
Start: 2019-08-12 | End: 2019-08-12 | Stop reason: HOSPADM

## 2019-08-12 RX ORDER — LIDOCAINE HYDROCHLORIDE 20 MG/ML
INJECTION, SOLUTION INFILTRATION; PERINEURAL AS NEEDED
Status: DISCONTINUED | OUTPATIENT
Start: 2019-08-12 | End: 2019-08-12 | Stop reason: SURG

## 2019-08-12 RX ORDER — ACETAMINOPHEN 325 MG/1
650 TABLET ORAL EVERY 4 HOURS PRN
Status: DISCONTINUED | OUTPATIENT
Start: 2019-08-12 | End: 2019-08-15 | Stop reason: HOSPADM

## 2019-08-12 RX ORDER — ATORVASTATIN CALCIUM 20 MG/1
20 TABLET, FILM COATED ORAL DAILY
Status: DISCONTINUED | OUTPATIENT
Start: 2019-08-12 | End: 2019-08-15 | Stop reason: HOSPADM

## 2019-08-12 RX ORDER — LIDOCAINE HYDROCHLORIDE 40 MG/ML
SOLUTION TOPICAL AS NEEDED
Status: DISCONTINUED | OUTPATIENT
Start: 2019-08-12 | End: 2019-08-12 | Stop reason: SURG

## 2019-08-12 RX ORDER — ROCURONIUM BROMIDE 10 MG/ML
INJECTION, SOLUTION INTRAVENOUS AS NEEDED
Status: DISCONTINUED | OUTPATIENT
Start: 2019-08-12 | End: 2019-08-12 | Stop reason: SURG

## 2019-08-12 RX ORDER — NALOXONE HCL 0.4 MG/ML
0.2 VIAL (ML) INJECTION AS NEEDED
Status: DISCONTINUED | OUTPATIENT
Start: 2019-08-12 | End: 2019-08-12 | Stop reason: HOSPADM

## 2019-08-12 RX ORDER — SUCCINYLCHOLINE CHLORIDE 20 MG/ML
INJECTION INTRAMUSCULAR; INTRAVENOUS AS NEEDED
Status: DISCONTINUED | OUTPATIENT
Start: 2019-08-12 | End: 2019-08-12 | Stop reason: SURG

## 2019-08-12 RX ORDER — NALOXONE HCL 0.4 MG/ML
0.1 VIAL (ML) INJECTION
Status: DISCONTINUED | OUTPATIENT
Start: 2019-08-12 | End: 2019-08-15 | Stop reason: HOSPADM

## 2019-08-12 RX ORDER — LIDOCAINE HYDROCHLORIDE 10 MG/ML
0.5 INJECTION, SOLUTION EPIDURAL; INFILTRATION; INTRACAUDAL; PERINEURAL ONCE AS NEEDED
Status: DISCONTINUED | OUTPATIENT
Start: 2019-08-12 | End: 2019-08-12 | Stop reason: HOSPADM

## 2019-08-12 RX ORDER — CEFAZOLIN SODIUM 2 G/100ML
2 INJECTION, SOLUTION INTRAVENOUS EVERY 8 HOURS
Status: COMPLETED | OUTPATIENT
Start: 2019-08-12 | End: 2019-08-13

## 2019-08-12 RX ORDER — MAGNESIUM HYDROXIDE 1200 MG/15ML
LIQUID ORAL AS NEEDED
Status: DISCONTINUED | OUTPATIENT
Start: 2019-08-12 | End: 2019-08-12 | Stop reason: HOSPADM

## 2019-08-12 RX ORDER — OXYCODONE HYDROCHLORIDE AND ACETAMINOPHEN 5; 325 MG/1; MG/1
2 TABLET ORAL EVERY 4 HOURS PRN
Status: DISCONTINUED | OUTPATIENT
Start: 2019-08-12 | End: 2019-08-14

## 2019-08-12 RX ORDER — PROMETHAZINE HYDROCHLORIDE 25 MG/1
25 SUPPOSITORY RECTAL ONCE AS NEEDED
Status: DISCONTINUED | OUTPATIENT
Start: 2019-08-12 | End: 2019-08-12 | Stop reason: HOSPADM

## 2019-08-12 RX ORDER — ACETAMINOPHEN 325 MG/1
650 TABLET ORAL ONCE AS NEEDED
Status: DISCONTINUED | OUTPATIENT
Start: 2019-08-12 | End: 2019-08-12 | Stop reason: HOSPADM

## 2019-08-12 RX ORDER — DIPHENHYDRAMINE HYDROCHLORIDE 50 MG/ML
12.5 INJECTION INTRAMUSCULAR; INTRAVENOUS
Status: DISCONTINUED | OUTPATIENT
Start: 2019-08-12 | End: 2019-08-12 | Stop reason: HOSPADM

## 2019-08-12 RX ORDER — SODIUM CHLORIDE 0.9 % (FLUSH) 0.9 %
3 SYRINGE (ML) INJECTION EVERY 12 HOURS SCHEDULED
Status: DISCONTINUED | OUTPATIENT
Start: 2019-08-12 | End: 2019-08-15 | Stop reason: HOSPADM

## 2019-08-12 RX ORDER — ONDANSETRON 2 MG/ML
INJECTION INTRAMUSCULAR; INTRAVENOUS AS NEEDED
Status: DISCONTINUED | OUTPATIENT
Start: 2019-08-12 | End: 2019-08-12 | Stop reason: SURG

## 2019-08-12 RX ORDER — SODIUM CHLORIDE 0.9 % (FLUSH) 0.9 %
1-10 SYRINGE (ML) INJECTION AS NEEDED
Status: DISCONTINUED | OUTPATIENT
Start: 2019-08-12 | End: 2019-08-15 | Stop reason: HOSPADM

## 2019-08-12 RX ORDER — SODIUM CHLORIDE, SODIUM LACTATE, POTASSIUM CHLORIDE, CALCIUM CHLORIDE 600; 310; 30; 20 MG/100ML; MG/100ML; MG/100ML; MG/100ML
100 INJECTION, SOLUTION INTRAVENOUS CONTINUOUS
Status: DISCONTINUED | OUTPATIENT
Start: 2019-08-12 | End: 2019-08-15 | Stop reason: HOSPADM

## 2019-08-12 RX ORDER — ASPIRIN 325 MG
325 TABLET ORAL DAILY
Status: DISCONTINUED | OUTPATIENT
Start: 2019-08-12 | End: 2019-08-15 | Stop reason: HOSPADM

## 2019-08-12 RX ORDER — BISACODYL 10 MG
10 SUPPOSITORY, RECTAL RECTAL DAILY PRN
Status: DISCONTINUED | OUTPATIENT
Start: 2019-08-12 | End: 2019-08-15 | Stop reason: HOSPADM

## 2019-08-12 RX ORDER — MIDAZOLAM HYDROCHLORIDE 1 MG/ML
INJECTION INTRAMUSCULAR; INTRAVENOUS AS NEEDED
Status: DISCONTINUED | OUTPATIENT
Start: 2019-08-12 | End: 2019-08-12 | Stop reason: SURG

## 2019-08-12 RX ORDER — ACETAMINOPHEN 325 MG/1
325 TABLET ORAL EVERY 4 HOURS PRN
Status: DISCONTINUED | OUTPATIENT
Start: 2019-08-12 | End: 2019-08-15 | Stop reason: HOSPADM

## 2019-08-12 RX ORDER — OXYCODONE HYDROCHLORIDE AND ACETAMINOPHEN 5; 325 MG/1; MG/1
1 TABLET ORAL EVERY 4 HOURS PRN
Status: DISCONTINUED | OUTPATIENT
Start: 2019-08-12 | End: 2019-08-14

## 2019-08-12 RX ORDER — SODIUM CHLORIDE, SODIUM LACTATE, POTASSIUM CHLORIDE, CALCIUM CHLORIDE 600; 310; 30; 20 MG/100ML; MG/100ML; MG/100ML; MG/100ML
9 INJECTION, SOLUTION INTRAVENOUS CONTINUOUS
Status: DISCONTINUED | OUTPATIENT
Start: 2019-08-12 | End: 2019-08-15 | Stop reason: HOSPADM

## 2019-08-12 RX ORDER — MIDAZOLAM HYDROCHLORIDE 1 MG/ML
2 INJECTION INTRAMUSCULAR; INTRAVENOUS
Status: DISCONTINUED | OUTPATIENT
Start: 2019-08-12 | End: 2019-08-12 | Stop reason: HOSPADM

## 2019-08-12 RX ORDER — MEPERIDINE HYDROCHLORIDE 25 MG/ML
12.5 INJECTION INTRAMUSCULAR; INTRAVENOUS; SUBCUTANEOUS
Status: DISCONTINUED | OUTPATIENT
Start: 2019-08-12 | End: 2019-08-12 | Stop reason: HOSPADM

## 2019-08-12 RX ORDER — MIDAZOLAM HYDROCHLORIDE 1 MG/ML
1 INJECTION INTRAMUSCULAR; INTRAVENOUS
Status: DISCONTINUED | OUTPATIENT
Start: 2019-08-12 | End: 2019-08-12 | Stop reason: HOSPADM

## 2019-08-12 RX ORDER — FENTANYL CITRATE 50 UG/ML
INJECTION, SOLUTION INTRAMUSCULAR; INTRAVENOUS AS NEEDED
Status: DISCONTINUED | OUTPATIENT
Start: 2019-08-12 | End: 2019-08-12 | Stop reason: SURG

## 2019-08-12 RX ORDER — DEXAMETHASONE SODIUM PHOSPHATE 10 MG/ML
INJECTION INTRAMUSCULAR; INTRAVENOUS AS NEEDED
Status: DISCONTINUED | OUTPATIENT
Start: 2019-08-12 | End: 2019-08-12 | Stop reason: SURG

## 2019-08-12 RX ORDER — ONDANSETRON 2 MG/ML
4 INJECTION INTRAMUSCULAR; INTRAVENOUS ONCE AS NEEDED
Status: DISCONTINUED | OUTPATIENT
Start: 2019-08-12 | End: 2019-08-12 | Stop reason: HOSPADM

## 2019-08-12 RX ORDER — CEFAZOLIN SODIUM 2 G/100ML
INJECTION, SOLUTION INTRAVENOUS AS NEEDED
Status: DISCONTINUED | OUTPATIENT
Start: 2019-08-12 | End: 2019-08-12 | Stop reason: SURG

## 2019-08-12 RX ORDER — HYDROMORPHONE HYDROCHLORIDE 1 MG/ML
0.5 INJECTION, SOLUTION INTRAMUSCULAR; INTRAVENOUS; SUBCUTANEOUS
Status: DISCONTINUED | OUTPATIENT
Start: 2019-08-12 | End: 2019-08-12 | Stop reason: HOSPADM

## 2019-08-12 RX ORDER — PROMETHAZINE HYDROCHLORIDE 25 MG/ML
6.25 INJECTION, SOLUTION INTRAMUSCULAR; INTRAVENOUS
Status: DISCONTINUED | OUTPATIENT
Start: 2019-08-12 | End: 2019-08-12 | Stop reason: HOSPADM

## 2019-08-12 RX ORDER — OXYCODONE AND ACETAMINOPHEN 7.5; 325 MG/1; MG/1
1 TABLET ORAL ONCE AS NEEDED
Status: DISCONTINUED | OUTPATIENT
Start: 2019-08-12 | End: 2019-08-12 | Stop reason: HOSPADM

## 2019-08-12 RX ORDER — ACETAMINOPHEN 500 MG
1000 TABLET ORAL
Status: COMPLETED | OUTPATIENT
Start: 2019-08-12 | End: 2019-08-12

## 2019-08-12 RX ORDER — PROMETHAZINE HYDROCHLORIDE 25 MG/1
25 TABLET ORAL ONCE AS NEEDED
Status: DISCONTINUED | OUTPATIENT
Start: 2019-08-12 | End: 2019-08-12 | Stop reason: HOSPADM

## 2019-08-12 RX ORDER — FLUMAZENIL 0.1 MG/ML
0.2 INJECTION INTRAVENOUS AS NEEDED
Status: DISCONTINUED | OUTPATIENT
Start: 2019-08-12 | End: 2019-08-12 | Stop reason: HOSPADM

## 2019-08-12 RX ORDER — LISINOPRIL 10 MG/1
10 TABLET ORAL EVERY MORNING
Status: DISCONTINUED | OUTPATIENT
Start: 2019-08-13 | End: 2019-08-15 | Stop reason: HOSPADM

## 2019-08-12 RX ORDER — BISACODYL 5 MG/1
10 TABLET, DELAYED RELEASE ORAL DAILY PRN
Status: DISCONTINUED | OUTPATIENT
Start: 2019-08-12 | End: 2019-08-15 | Stop reason: HOSPADM

## 2019-08-12 RX ORDER — FAMOTIDINE 10 MG/ML
20 INJECTION, SOLUTION INTRAVENOUS ONCE
Status: COMPLETED | OUTPATIENT
Start: 2019-08-12 | End: 2019-08-12

## 2019-08-12 RX ORDER — PROPOFOL 10 MG/ML
VIAL (ML) INTRAVENOUS AS NEEDED
Status: DISCONTINUED | OUTPATIENT
Start: 2019-08-12 | End: 2019-08-12 | Stop reason: SURG

## 2019-08-12 RX ORDER — TRANEXAMIC ACID 100 MG/ML
INJECTION, SOLUTION INTRAVENOUS AS NEEDED
Status: DISCONTINUED | OUTPATIENT
Start: 2019-08-12 | End: 2019-08-12 | Stop reason: SURG

## 2019-08-12 RX ORDER — FERROUS SULFATE 325(65) MG
325 TABLET ORAL
Status: DISCONTINUED | OUTPATIENT
Start: 2019-08-13 | End: 2019-08-15 | Stop reason: HOSPADM

## 2019-08-12 RX ORDER — HYDRALAZINE HYDROCHLORIDE 20 MG/ML
5 INJECTION INTRAMUSCULAR; INTRAVENOUS
Status: DISCONTINUED | OUTPATIENT
Start: 2019-08-12 | End: 2019-08-12 | Stop reason: HOSPADM

## 2019-08-12 RX ORDER — DILTIAZEM HYDROCHLORIDE 240 MG/1
240 CAPSULE, COATED, EXTENDED RELEASE ORAL EVERY MORNING
Status: DISCONTINUED | OUTPATIENT
Start: 2019-08-13 | End: 2019-08-13

## 2019-08-12 RX ORDER — KETOROLAC TROMETHAMINE 30 MG/ML
INJECTION, SOLUTION INTRAMUSCULAR; INTRAVENOUS AS NEEDED
Status: DISCONTINUED | OUTPATIENT
Start: 2019-08-12 | End: 2019-08-12 | Stop reason: SURG

## 2019-08-12 RX ORDER — SENNA AND DOCUSATE SODIUM 50; 8.6 MG/1; MG/1
2 TABLET, FILM COATED ORAL ONCE
Status: COMPLETED | OUTPATIENT
Start: 2019-08-12 | End: 2019-08-12

## 2019-08-12 RX ORDER — SODIUM CHLORIDE 0.9 % (FLUSH) 0.9 %
1-10 SYRINGE (ML) INJECTION AS NEEDED
Status: DISCONTINUED | OUTPATIENT
Start: 2019-08-12 | End: 2019-08-12 | Stop reason: HOSPADM

## 2019-08-12 RX ADMIN — OXYCODONE HYDROCHLORIDE AND ACETAMINOPHEN 2 TABLET: 5; 325 TABLET ORAL at 14:51

## 2019-08-12 RX ADMIN — KETOROLAC TROMETHAMINE 30 MG: 30 INJECTION, SOLUTION INTRAMUSCULAR; INTRAVENOUS at 11:37

## 2019-08-12 RX ADMIN — FENTANYL CITRATE 50 MCG: 50 INJECTION, SOLUTION INTRAMUSCULAR; INTRAVENOUS at 11:20

## 2019-08-12 RX ADMIN — CEFAZOLIN SODIUM 2 G: 2 INJECTION, SOLUTION INTRAVENOUS at 18:03

## 2019-08-12 RX ADMIN — SENNOSIDES AND DOCUSATE SODIUM 2 TABLET: 8.6; 5 TABLET ORAL at 14:51

## 2019-08-12 RX ADMIN — ASPIRIN 325 MG: 325 TABLET ORAL at 16:22

## 2019-08-12 RX ADMIN — FAMOTIDINE 20 MG: 10 INJECTION INTRAVENOUS at 08:25

## 2019-08-12 RX ADMIN — DEXAMETHASONE SODIUM PHOSPHATE 10 MG: 10 INJECTION INTRAMUSCULAR; INTRAVENOUS at 10:35

## 2019-08-12 RX ADMIN — LIDOCAINE HYDROCHLORIDE 1 EACH: 40 SOLUTION TOPICAL at 10:28

## 2019-08-12 RX ADMIN — FAMOTIDINE 20 MG: 20 TABLET, FILM COATED ORAL at 16:22

## 2019-08-12 RX ADMIN — VANCOMYCIN HYDROCHLORIDE 2000 MG: 10 INJECTION, POWDER, LYOPHILIZED, FOR SOLUTION INTRAVENOUS at 08:25

## 2019-08-12 RX ADMIN — FENTANYL CITRATE 50 MCG: 50 INJECTION, SOLUTION INTRAMUSCULAR; INTRAVENOUS at 10:55

## 2019-08-12 RX ADMIN — ACETAMINOPHEN 1000 MG: 500 TABLET, FILM COATED ORAL at 08:14

## 2019-08-12 RX ADMIN — PROPOFOL 150 MG: 10 INJECTION, EMULSION INTRAVENOUS at 10:26

## 2019-08-12 RX ADMIN — SODIUM CHLORIDE, PRESERVATIVE FREE 3 ML: 5 INJECTION INTRAVENOUS at 21:03

## 2019-08-12 RX ADMIN — SUCCINYLCHOLINE CHLORIDE 140 MG: 20 INJECTION, SOLUTION INTRAMUSCULAR; INTRAVENOUS; PARENTERAL at 10:26

## 2019-08-12 RX ADMIN — ROCURONIUM BROMIDE 5 MG: 10 INJECTION INTRAVENOUS at 10:26

## 2019-08-12 RX ADMIN — Medication 2 MG: at 10:19

## 2019-08-12 RX ADMIN — SODIUM CHLORIDE, POTASSIUM CHLORIDE, SODIUM LACTATE AND CALCIUM CHLORIDE 9 ML/HR: 600; 310; 30; 20 INJECTION, SOLUTION INTRAVENOUS at 08:25

## 2019-08-12 RX ADMIN — SERTRALINE 150 MG: 100 TABLET, FILM COATED ORAL at 16:22

## 2019-08-12 RX ADMIN — FENTANYL CITRATE 50 MCG: 50 INJECTION, SOLUTION INTRAMUSCULAR; INTRAVENOUS at 11:08

## 2019-08-12 RX ADMIN — OXYCODONE HYDROCHLORIDE AND ACETAMINOPHEN 1 TABLET: 5; 325 TABLET ORAL at 23:21

## 2019-08-12 RX ADMIN — FENTANYL CITRATE 50 MCG: 50 INJECTION, SOLUTION INTRAMUSCULAR; INTRAVENOUS at 11:02

## 2019-08-12 RX ADMIN — ATORVASTATIN CALCIUM 20 MG: 20 TABLET, FILM COATED ORAL at 16:22

## 2019-08-12 RX ADMIN — CEFAZOLIN SODIUM 2 G: 2 INJECTION, SOLUTION INTRAVENOUS at 11:43

## 2019-08-12 RX ADMIN — OXYCODONE HYDROCHLORIDE AND ACETAMINOPHEN 1 TABLET: 5; 325 TABLET ORAL at 19:16

## 2019-08-12 RX ADMIN — TRANEXAMIC ACID 1000 MG: 100 INJECTION, SOLUTION INTRAVENOUS at 11:22

## 2019-08-12 RX ADMIN — LIDOCAINE HYDROCHLORIDE 80 MG: 20 INJECTION, SOLUTION INFILTRATION; PERINEURAL at 10:26

## 2019-08-12 RX ADMIN — MIDAZOLAM 2 MG: 1 INJECTION INTRAMUSCULAR; INTRAVENOUS at 08:25

## 2019-08-12 RX ADMIN — KETOROLAC TROMETHAMINE 30 MG: 30 INJECTION, SOLUTION INTRAMUSCULAR at 21:03

## 2019-08-12 RX ADMIN — ONDANSETRON 4 MG: 2 INJECTION INTRAMUSCULAR; INTRAVENOUS at 10:35

## 2019-08-12 RX ADMIN — FENTANYL CITRATE 50 MCG: 50 INJECTION, SOLUTION INTRAMUSCULAR; INTRAVENOUS at 10:21

## 2019-08-12 RX ADMIN — FENTANYL CITRATE 50 MCG: 50 INJECTION INTRAMUSCULAR; INTRAVENOUS at 12:47

## 2019-08-12 RX ADMIN — KETOROLAC TROMETHAMINE 30 MG: 30 INJECTION, SOLUTION INTRAMUSCULAR at 16:22

## 2019-08-12 NOTE — PLAN OF CARE
Problem: Patient Care Overview  Goal: Plan of Care Review   08/12/19 4628   Coping/Psychosocial   Plan of Care Reviewed With patient   OTHER   Outcome Summary Pt is a 60 yo female who presents s/p R TKA demonstrating post op pain, impaired R knee ROM, R LE weakness, impaired gait mechanics, and impaired balance limiting mobility. Pt was independent with mobility prior to admission; currently requiring CGA to min A with functional mobility. Only able to ambulate several steps from bed to chair due to pain. Pt would benefit from continued PT to address impairments, increase independence, and assist w/return to PLOF.

## 2019-08-12 NOTE — ANESTHESIA POSTPROCEDURE EVALUATION
"Patient: Ann-Marie Eaton    Procedure Summary     Date:  08/12/19 Room / Location:  [facility] OR [address] [place] [address] MAIN OR    Anesthesia Start:  1018 Anesthesia Stop:  1221    Procedure:  RIGHT TOTAL KNEE ARTHROPLASTY (Right Knee) Diagnosis:      Surgeon:  Cam Ryan MD Provider:  Sherin Ngo MD    Anesthesia Type:  general ASA Status:  3          Anesthesia Type: general  Last vitals  BP   171/90 (08/12/19 1245)   Temp   36.9 °C (98.5 °F) (08/12/19 1217)   Pulse   64 (08/12/19 1245)   Resp   14 (08/12/19 1245)     SpO2   96 % (08/12/19 1245)     Post Anesthesia Care and Evaluation    Patient location during evaluation: bedside  Patient participation: complete - patient participated  Level of consciousness: awake  Pain score: 2  Pain management: adequate  Airway patency: patent  Anesthetic complications: No anesthetic complications  PONV Status: none  Cardiovascular status: acceptable  Respiratory status: acceptable  Hydration status: acceptable    Comments: /90 (BP Location: Right arm)   Pulse 64   Temp 36.9 °C (98.5 °F) (Oral)   Resp 14   Ht 162.6 cm (64\")   Wt 115 kg (254 lb 5 oz)   SpO2 96%   BMI 43.65 kg/m²         "

## 2019-08-12 NOTE — PLAN OF CARE
Problem: Patient Care Overview  Goal: Plan of Care Review  Outcome: Ongoing (interventions implemented as appropriate)   08/12/19 7574   Coping/Psychosocial   Plan of Care Reviewed With patient   OTHER   Outcome Summary Pain controlled with PO pain medication. Ambulates with assist x1 and walker. VSS. Voiding without difficulty. DC to rehab when ready. Educated about BP monitoring.    Plan of Care Review   Progress improving     Goal: Individualization and Mutuality  Outcome: Ongoing (interventions implemented as appropriate)    Goal: Discharge Needs Assessment  Outcome: Ongoing (interventions implemented as appropriate)    Goal: Interprofessional Rounds/Family Conf  Outcome: Ongoing (interventions implemented as appropriate)      Problem: Fall Risk (Adult)  Goal: Identify Related Risk Factors and Signs and Symptoms  Outcome: Outcome(s) achieved Date Met: 08/12/19    Goal: Absence of Fall  Outcome: Ongoing (interventions implemented as appropriate)      Problem: Knee Arthroplasty (Total, Partial) (Adult)  Goal: Signs and Symptoms of Listed Potential Problems Will be Absent, Minimized or Managed (Knee Arthroplasty)  Outcome: Ongoing (interventions implemented as appropriate)    Goal: Anesthesia/Sedation Recovery  Outcome: Ongoing (interventions implemented as appropriate)

## 2019-08-12 NOTE — THERAPY EVALUATION
Patient Name: Ann-Marie Eaton  : 1960    MRN: [id number]                              Today's Date: 2019       Admit Date: 2019    Visit Dx: No diagnosis found.  Patient Active Problem List   Diagnosis   • Osteoarthritis, knee   • Hypertension   • SHANNAN (obstructive sleep apnea)   • IBS (irritable bowel syndrome)   • Gastric reflux   • Fatty liver disease, nonalcoholic   • Vitamin D deficiency   • DDD (degenerative disc disease), lumbosacral   • Hyperlipidemia   • Kidney stone   • Depression   • Anxiety, generalized   • GERD (gastroesophageal reflux disease)   • Dental will-implantitis   • Leukocytosis   • DJD (degenerative joint disease) of knee     Past Medical History:   Diagnosis Date   • Anxiety    • DDD (degenerative disc disease), lumbar    • Eczema    • Fatty liver disease, nonalcoholic    • GERD (gastroesophageal reflux disease)    • History of concussion    • HLD (hyperlipidemia)    • HTN (hypertension)    • IBS (irritable bowel syndrome)    • SHANNAN (obstructive sleep apnea)     NO MACHINE   • Osteoarthritis of right knee    • Urinary incontinence in female     WEARS PADS   • Urolithiasis    • Vitamin D deficiency      Past Surgical History:   Procedure Laterality Date   • APPENDECTOMY     • CHOLECYSTECTOMY     • CYSTOSCOPY W/ LASER LITHOTRIPSY  2014   • EXTRACORPOREAL SHOCK WAVE LITHOTRIPSY (ESWL)      STENT  X2   • AR TOTAL KNEE ARTHROPLASTY Left 2016    Procedure: LT TOTAL KNEE ARTHROPLASTY;  Surgeon: Cam Ryan MD;  Location: [facility];  Service: Orthopedics   • TONSILLECTOMY     • URETEROTOMY  2014    R Stent- stone     General Information     Row Name 19 1611          PT Evaluation Time/Intention    Document Type  evaluation  -EE     Mode of Treatment  physical therapy  -EE     Row Name 19 1611          General Information    Patient Profile Reviewed?  yes  -EE     Prior Level of Function  independent:;all household mobility has walker  -EE     Barriers to  Rehab  none identified  -EE     Row Name 08/12/19 1611 08/12/19 0802       Relationship/Environment    Primary Source of Support/Comfort  --  child(misty)  -KR    Name of Support/Comfort Primary Source  --  Ani  -KR    Lives With  child(misty), adult  -EE  child(misty), adult;other (see comments) other relatives as well  -KR    Family Caregiver if Needed  --  child(misty), adult;other relative(s);other (see comments) friend  -KR    Primary Roles/Responsibilities  --  wage earner, full time  -KR    Row Name 08/12/19 1611 08/12/19 1506       Resource/Environmental Concerns    Current Living Arrangements  home/apartment/condo  -EE  home/apartment/condo  -AC    Resource/Environmental Concerns  --  none  -AC    Row Name 08/12/19 0802          Resource/Environmental Concerns    Current Living Arrangements  home/apartment/condo  -KR     Resource/Environmental Concerns  none  -KR     Row Name 08/12/19 1611          Home Main Entrance    Number of Stairs, Main Entrance  none  -EE     Row Name 08/12/19 1611          Cognitive Assessment/Intervention- PT/OT    Orientation Status (Cognition)  oriented x 3  -EE       User Key  (r) = Recorded By, (t) = Taken By, (c) = Cosigned By    Initials Name Provider Type    EE Amanda Valencia, PT Physical Therapist    Nessa Rausch RN Registered Nurse    Camille Willis, RN Registered Nurse        Mobility     Row Name 08/12/19 1611          Bed Mobility Assessment/Treatment    Bed Mobility Assessment/Treatment  supine-sit  -EE     Supine-Sit Emmons (Bed Mobility)  contact guard  -EE     Assistive Device (Bed Mobility)  bed rails;head of bed elevated  -EE     Row Name 08/12/19 1611          Transfer Assessment/Treatment    Comment (Transfers)  3 attempts required to achieve full stand; verbal cues for setup and hand placement  -EE     Row Name 08/12/19 1611          Sit-Stand Transfer    Sit-Stand Emmons (Transfers)  minimum assist (75% patient effort);verbal cues  -EE      Assistive Device (Sit-Stand Transfers)  walker, front-wheeled  -EE     Row Name 08/12/19 1611          Gait/Stairs Assessment/Training    [place] Level (Gait)  contact guard;verbal cues  -EE     Assistive Device (Gait)  walker, front-wheeled  -EE     Distance in Feet (Gait)  3  -EE     Deviations/Abnormal Patterns (Gait)  antalgic;stride length decreased;tiffanie decreased;right sided deviations  -EE     Bilateral Gait Deviations  forward flexed posture  -EE     Left Sided Gait Deviations  --  -EE     Right Sided Gait Deviations  heel strike decreased;weight shift ability decreased  -EE     Comment (Gait/Stairs)  verbal cueing required for sequencing with walker; increased time required for ambulation; pain limiting distance  -EE       User Key  (r) = Recorded By, (t) = Taken By, (c) = Cosigned By    Initials Name Provider Type    EE Amanda Valencia, PT Physical Therapist        Obj/Interventions     Row Name 08/12/19 1613          General ROM    GENERAL ROM COMMENTS  R knee ROM impaired ~80%; all other LE ROM WFL  -EE     Row Name 08/12/19 1613          MMT (Manual Muscle Testing)    General MMT Comments  R quad 3-/5; all other LE strength grossly 4-/5  -EE     Row Name 08/12/19 1613          Therapeutic Exercise    Comment (Therapeutic Exercise)  R TKA protocol x10 reps, assist w/heel slides   -EE       User Key  (r) = Recorded By, (t) = Taken By, (c) = Cosigned By    Initials Name Provider Type    EE Amanda Valencia, PT Physical Therapist        Goals/Plan     Row Name 08/12/19 1615          Bed Mobility Goal 1 (PT)    Activity/Assistive Device (Bed Mobility Goal 1, PT)  bed mobility activities, all  -EE     [place] Level/Cues Needed (Bed Mobility Goal 1, PT)  conditional independence  -EE     Time Frame (Bed Mobility Goal 1, PT)  5 days  -EE     Progress/Outcomes (Bed Mobility Goal 1, PT)  goal ongoing  -EE     Row Name 08/12/19 1615          Transfer Goal 1 (PT)    Activity/Assistive Device (Transfer Goal  1, PT)  sit-to-stand/stand-to-sit  -EE     [place] Level/Cues Needed (Transfer Goal 1, PT)  standby assist  -EE     Time Frame (Transfer Goal 1, PT)  5 days  -EE     Progress/Outcome (Transfer Goal 1, PT)  goal ongoing  -EE     Row Name 08/12/19 1615          Gait Training Goal 1 (PT)    Activity/Assistive Device (Gait Training Goal 1, PT)  gait (walking locomotion);walker, rolling  -EE     [place] Level (Gait Training Goal 1, PT)  contact guard assist  -EE     Distance (Gait Goal 1, PT)  100  -EE     Time Frame (Gait Training Goal 1, PT)  5 days  -EE     Progress/Outcome (Gait Training Goal 1, PT)  goal ongoing  -EE     Row Name 08/12/19 1615          ROM Goal 1 (PT)    ROM Goal 1 (PT)  R knee AROM 0-90  -EE     Time Frame (ROM Goal 1, PT)  3 - 5 days  -EE     Progress/Outcome (ROM Goal 1, PT)  goal ongoing  -EE     [facility] Name 08/12/19 1615          Patient Education Goal (PT)    Activity (Patient Education Goal, PT)  R TKA HEP  -EE     [place]/Cues/Accuracy (Memory Goal 2, PT)  demonstrates adequately;verbalizes understanding  -EE     Time Frame (Patient Education Goal, PT)  5 days  -EE     Progress/Outcome (Patient Education Goal, PT)  goal ongoing  -EE       User Key  (r) = Recorded By, (t) = Taken By, (c) = Cosigned By    Initials Name Provider Type    EE Amanda Valencia, PT Physical Therapist        Clinical Impression     [facility] Name 08/12/19 1614          Pain Assessment    Additional Documentation  Pain Scale: Numbers Pre/Post-Treatment (Group)  -EE     Row Name 08/12/19 1614          Pain Scale: Numbers Pre/Post-Treatment    Pain Scale: Numbers, Pretreatment  3/10  -EE     Pain Scale: Numbers, Post-Treatment  5/10  -EE     Pain Location - Side  Right  -EE     Pain Location - Orientation  incisional  -EE     Pain Location  knee  -EE     Pain Intervention(s)  Medication (See MAR);Cold applied;Repositioned;Elevated  -EE     Row Name 08/12/19 1616 08/12/19 1614       Plan of Care Review    Plan of Care  Reviewed With  patient  -EE  patient  -EE    Row Name 08/12/19 0810          Plan of Care Review    Plan of Care Reviewed With  patient  -KR     Row Name 08/12/19 1614          Physical Therapy Clinical Impression    Patient/Family Goals Statement (PT Clinical Impression)  Go to rehab  -EE     Criteria for Skilled Interventions Met (PT Clinical Impression)  yes;treatment indicated  -EE     Rehab Potential (PT Clinical Summary)  good, to achieve stated therapy goals  -EE     Row Name 08/12/19 1614          Positioning and Restraints    Pre-Treatment Position  in bed  -EE     Post Treatment Position  chair  -EE     In Chair  reclined;call light within reach;encouraged to call for assist;exit alarm on;legs elevated  -EE       User Key  (r) = Recorded By, (t) = Taken By, (c) = Cosigned By    Initials Name Provider Type    Amanda Toussiant PT Physical Therapist    Nessa Rausch, RN Registered Nurse        Outcome Measures     Row Name 08/12/19 1617          How much help from another person do you currently need...    Turning from your back to your side while in flat bed without using bedrails?  3  -EE     Moving from lying on back to sitting on the side of a flat bed without bedrails?  3  -EE     Moving to and from a bed to a chair (including a wheelchair)?  3  -EE     Standing up from a chair using your arms (e.g., wheelchair, bedside chair)?  3  -EE     Climbing 3-5 steps with a railing?  1  -EE     To walk in hospital room?  3  -EE     AM-PAC 6 Clicks Score (PT)  16  -EE     Row Name 08/12/19 1617          Functional Assessment    Outcome Measure Options  AM-PAC 6 Clicks Basic Mobility (PT)  -EE       User Key  (r) = Recorded By, (t) = Taken By, (c) = Cosigned By    Initials Name Provider Type    Amanda Toussaint PT Physical Therapist        Physical Therapy Education     Title: PT OT SLP Therapies (Done)     Topic: Physical Therapy (Done)     Point: Mobility training (Done)     Learning Progress Summary            Patient Acceptance, E,D, NR,VU by EE at 8/12/2019  4:16 PM                   Point: Home exercise program (Done)     Learning Progress Summary           Patient Acceptance, E,D, NR,VU by EE at 8/12/2019  4:16 PM                   Point: Body mechanics (Done)     Learning Progress Summary           Patient Acceptance, E,D, NR,VU by EE at 8/12/2019  4:16 PM                   Point: Precautions (Done)     Learning Progress Summary           Patient Acceptance, E,D, NR,VU by EE at 8/12/2019  4:16 PM                               User Key     Initials Effective Dates Name Provider Type Discipline    EE 04/03/18 -  Amanda Valencia, PT Physical Therapist PT              PT Recommendation and Plan  Planned Therapy Interventions (PT Eval): balance training, bed mobility training, gait training, home exercise program, patient/family education, ROM (range of motion), strengthening, stretching, transfer training  Outcome Summary/Treatment Plan (PT)  Anticipated Discharge Disposition (PT): home with assist, home with home health, skilled nursing facility(pending progress)  Plan of Care Reviewed With: patient  Outcome Summary: Pt is a 58 yo female who presents s/p R TKA demonstrating post op pain, impaired R knee ROM, R LE weakness, impaired gait mechanics, and impaired balance limiting mobility. Pt was independent with mobility prior to admission; currently requiring CGA to min A with functional mobility. Only able to ambulate several steps from bed to chair due to pain. Pt would benefit from continued PT to address impairments, increase independence, and assist w/return to PLOF.      Time Calculation:   PT Charges     Row Name 08/12/19 161             Time Calculation    Start Time  1541  -EE      Stop Time  1610  -EE      Time Calculation (min)  29 min  -EE      PT Received On  08/12/19  -EE      PT - Next Appointment  08/13/19  -EE      PT Goal Re-Cert Due Date  08/19/19  -EE         Time Calculation- PT    Total Timed Code  Minutes- PT  15 minute(s)  -EE        User Key  (r) = Recorded By, (t) = Taken By, (c) = Cosigned By    Initials Name Provider Type    EE Amanda Valencia, PT Physical Therapist        Therapy Charges for Today     Code Description Service Date Service Provider Modifiers Qty    [id number]  PT EVAL LOW COMPLEXITY 2 8/12/2019 Amanda Valencia, PT GP 1    49986950398 HC PT THER PROC EA 15 MIN 8/12/2019 Amanda Valencia, PT GP 1          PT G-Codes  Outcome Measure Options: AM-PAC 6 Clicks Basic Mobility (PT)  AM-PAC 6 Clicks Score (PT): 16    Amanda Valencia PT  8/12/2019

## 2019-08-12 NOTE — OP NOTE
Orthopaedic Surgery   Right Total Knee Replacement  Dr. Cam Ryan   [phone]  Patient Name:  Ann-Marie Eaton  YOB: 1960  Medical Records Number:  [id number]    Date of Procedure:  8/12/2019    Pre-operative Diagnosis:  DJD Right Knee    Post-operative Diagnosis:  DJD Right Knee    Procedure Performed:  Right Total Knee Replacement     Anesthesia: General, supplemented by a periarticular Exparel/bupivacaine injection.      Surgeon: Cam Ryan MD     Assistant: Joaquim Pop MD; note that as part of the surgical procedure, I utilized service of an assistant surgeon, specifically Joaquim Pop MD. Assistant surgeon participated in crucial portion of the operation. Use of the assistant surgeon greatly reduced overall operative time, thus significantly reducing overall morbidity for the patient.      Implants:    Implant Name Type Inv. Item Serial No.  Lot No. LRB No. Used Action   CMT BONE PALACOS R HI/VISC 1X40 - [id number] Implant CMT BONE PALACOS R HI/VISC 1X40  [employer] [id number] Right 1 Implanted   CMT BONE PALACOS R HI/VISC 1X40 - [id number] Implant CMT BONE PALACOS R HI/VISC 1X40  [employer] [id number] Right 1 Implanted   COMP FEM JOURNEY2 OXINIUM CR RT SZ4 - [id number] Implant COMP FEM JOURNEY2 OXINIUM CR RT SZ4  [employer] [id number] Right 1 Implanted   BASEPLT TIB JOURNEY NONPOR SZ3 RT - [id number] Implant BASEPLT TIB JOURNEY NONPOR SZ3 RT  [employer] [id number] Right 1 Implanted   PATELLA RESRF JOURNEY 7.5X32MM RND - [id number] Implant PATELLA RESRF JOURNEY 7.5X32MM RND  [employer] [id number] Right 1 Implanted   ARTICULAR INSERT    [employer] [id number] Right 1 Implanted       Implants utilized: I used the [employer] journey 2 system.  I used a size 3 tibial baseplate.  Size 4  femur.  11 CR mm  polyethylene insert.  32 patella.    Tourniquet Time: Was 47 minutes.      Medications: Note that we gave 1 g IV tranexamic acid when the  cement was mixed.      Estimated Blood Loss:   150 mL    Specimens: * No orders in the log *     Complications: None.      Quality Measures: I have documented the patient's current medications including dosage, frequency, and route of administration in medical record today. Conservative alternatives were discussed with the patient as part of the decision-making process prior to the procedure. The patient was evaluated immediately preoperatively for cardiovascular and thromboembolic risk factors.  There are no acute risk factors.  Prophylactic IV antibiotics were administered before the tourniquet went up.      Description of Procedure: After prep and drape, Right  knee was approached via midline longitudinal anterior incision. Medial parapatellar arthrotomy was extended to the vastus medialis obliquus which was split in line with the fibers near the medial border, in keeping with minimally invasive technique. Patella was osteotomized proper depth for the patella implant. [place] holes are created. Patella was subluxed and protected. Intramedullary instrumentation was used on each side of the joint; careful and thorough canal content irrigation. I cut the femur 10 mm depth, 5° valgus controlling rotation. The femur  was sized appropriatelyt. Anterior, posterior, and chamfer cuts were made. Tibia is cut 10 mm depth, 5° of posterior slope. Meniscectomies are completed. Trial reduction is performed. Rotation is marked and keel slot was created.      Bony surface was thoroughly cleaned and dried. I injected the posterior capsule and medial lateral gutter with Exparel solution containing 20 mL Exparel, 25 mL 0.25% bupivacaine with epinephrine, 20 mL saline. Care was taken to protect popliteal neurovascular structures and the peroneal nerve. I cemented the tibial baseplate,  Femur, and patella.  Trial reduction revealed a 11 mm CR polyethylene insert best balanced stability and range of motion, and is locked in the tibial  tray.      Tourniquet was released; hemostasis obtained. Wound was closed in layers with #1 Vicryl on the capsule, 2-0 Vicryl in subcutaneous tissue, and zipline in the skin over a 1/8-inch drain. Note that I injected my capsule with my Exparel solution during closure.      Cam Ryan MD  8/12/2019  4:28 PM

## 2019-08-12 NOTE — PROGRESS NOTES
"   LOS: 0 days    Patient Care Team:  John Cowan MD as PCP - General (Internal Medicine)  John Cowan MD as PCP - Internal Medicine  Gildardo Goode MD as Consulting Physician (Urology)    Chief Complaint: Postop knee replacement  Subjective     Interval History:     Patient Complaints: Patient is postop knee replacement.  She is taking a few steps from bed to chair so far.  Sitting up eating dinner.  Pain control is excellent.  No cardiovascular complaints or GI complaints.    Objective     Vital Signs  Temp:  [97.7 °F (36.5 °C)-98.5 °F (36.9 °C)] 97.7 °F (36.5 °C)  Heart Rate:  [61-82] 66  Resp:  [14-18] 16  BP: (140-171)/(66-99) 157/70    Flowsheet Rows      First Filed Value   Admission Height  162.6 cm (64\") Documented at 08/12/2019 0758   Admission Weight  115 kg (254 lb 5 oz) Documented at 08/12/2019 0758          I/O this shift:  In: 850 [I.V.:850]  Out: 450 [Urine:250; Drains:50; Blood:150]  No intake/output data recorded.    Intake/Output Summary (Last 24 hours) at 8/12/2019 1740  Last data filed at 8/12/2019 1420  Gross per 24 hour   Intake 850 ml   Output 450 ml   Net 400 ml       Physical Exam:   General Appearance:    Alert, cooperative, in no acute distress       Eyes:            Conjunctivae and sclerae normal, no   icterus, PERRLA   Neck:   No adenopathy, supple, trachea midline       Lungs:     Clear to auscultation,respirations regular, even and                  unlabored    Heart:    Regular rhythm and normal rate, normal S1 and S2, no            murmur, no gallop, no rub, no click       Abdomen:     Normal bowel sounds, no masses, no organomegaly, soft        non-tender, non-distended, no guarding, no rebound                tenderness       Extremities:    Pulses:        Lymph nodes:            Results Review:    I reviewed the patient's new clinical results.  Results from last 7 days   Lab Units 08/08/19  1443   SODIUM mmol/L 142   POTASSIUM mmol/L 3.9   CHLORIDE mmol/L 105 "   CO2 mmol/L 26.3   BUN mg/dL 9   CREATININE mg/dL 0.60   CALCIUM mg/dL 9.2   BILIRUBIN mg/dL 0.7   ALK PHOS U/L 104   ALT (SGPT) U/L 17   AST (SGOT) U/L 16   GLUCOSE mg/dL 111*       Estimated Creatinine Clearance: 122.5 mL/min (by C-G formula based on SCr of 0.6 mg/dL).                Results from last 7 days   Lab Units 08/08/19  1444   WBC 10*3/mm3 8.12   HEMOGLOBIN g/dL 13.1   PLATELETS 10*3/mm3 215       Results from last 7 days   Lab Units 08/08/19  1443   INR  1.05         Imaging Results (last 24 hours)     Procedure Component Value Units Date/Time    XR Knee 1 or 2 View Right [[id number]] Collected:  08/12/19 1256     Updated:  08/12/19 1259    Narrative:       XR KNEE 1 OR 2 VW RIGHT-     POSTOP PORTABLE 2 VIEWS RIGHT KNEE     CLINICAL INFORMATION: Post arthroplasty     FINDINGS: Prosthesis is satisfactory in position. A complicating process  is not identified.     This report was finalized on 8/12/2019 12:56 PM by Dr. Ronnie Mckay M.D.             aspirin 325 mg Oral Daily   atorvastatin 20 mg Oral Daily   ceFAZolin 2 g Intravenous Q8H   [START ON 8/13/2019] diltiaZEM  mg Oral QAM   famotidine 20 mg Oral Daily   [START ON 8/13/2019] ferrous sulfate 325 mg Oral Daily With Breakfast   ketorolac 30 mg Intravenous Q6H   [START ON 8/13/2019] lisinopril 10 mg Oral QAM   sertraline 150 mg Oral Daily   sodium chloride 3 mL Intravenous Q12H       lactated ringers 9 mL/hr Last Rate: 9 mL/hr (08/12/19 0825)   lactated ringers 100 mL/hr    lactated ringers 100 mL/hr        Medication Review:   Current Facility-Administered Medications   Medication Dose Route Frequency Provider Last Rate Last Dose   • acetaminophen (TYLENOL) tablet 650 mg  650 mg Oral Q4H PRN Cam Ryan MD        Or   • acetaminophen (TYLENOL) 160 MG/5ML solution 650 mg  650 mg Oral Q4H PRN Cam Ryan MD        Or   • acetaminophen (TYLENOL) suppository 650 mg  650 mg Rectal Q4H PRN Cam Ryan MD       • acetaminophen  (TYLENOL) tablet 325 mg  325 mg Oral Q4H PRN Cam Ryan MD       • aspirin tablet 325 mg  325 mg Oral Daily Cam Ryan MD   325 mg at 08/12/19 1622   • atorvastatin (LIPITOR) tablet 20 mg  20 mg Oral Daily Cam Ryan MD   20 mg at 08/12/19 1622   • bisacodyl (DULCOLAX) EC tablet 10 mg  10 mg Oral Daily PRN Cam Ryan MD       • bisacodyl (DULCOLAX) suppository 10 mg  10 mg Rectal Daily PRN Cam Ryan MD       • ceFAZolin in dextrose (ANCEF) IVPB solution 2 g  2 g Intravenous Q8H Cam Ryan MD       • [START ON 8/13/2019] diltiaZEM CD (CARDIZEM CD) 24 hr capsule 240 mg  240 mg Oral QAM Cam Ryan MD       • famotidine (PEPCID) tablet 20 mg  20 mg Oral Daily Cam Ryan MD   20 mg at 08/12/19 1622   • [START ON 8/13/2019] ferrous sulfate tablet 325 mg  325 mg Oral Daily With Breakfast Cam Ryan MD       • HYDROmorphone (DILAUDID) injection 1 mg  1 mg Intramuscular Q4H PRN Cam Ryan MD        And   • naloxone (NARCAN) injection 0.1 mg  0.1 mg Intravenous Q5 Min PRN Cam Ryan MD       • ketorolac (TORADOL) injection 30 mg  30 mg Intravenous Q6H Cam Ryan MD   30 mg at 08/12/19 1622   • lactated ringers infusion  9 mL/hr Intravenous Continuous Sherin Ngo MD 9 mL/hr at 08/12/19 0825 9 mL/hr at 08/12/19 0825   • lactated ringers infusion  100 mL/hr Intravenous Continuous Cam Ryan MD       • lactated ringers infusion  100 mL/hr Intravenous Continuous Chika Waters CRNA       • [START ON 8/13/2019] lisinopril (PRINIVIL,ZESTRIL) tablet 10 mg  10 mg Oral QAM Cam Ryan MD       • magnesium hydroxide (MILK OF MAGNESIA) suspension 2400 mg/10mL 10 mL  10 mL Oral Daily PRN Cam Ryan MD       • oxyCODONE-acetaminophen (PERCOCET) 5-325 MG per tablet 1 tablet  1 tablet Oral Q4H PRN Cam Ryan MD       • oxyCODONE-acetaminophen (PERCOCET) 5-325 MG per tablet 2 tablet  2 tablet Oral Q4H PRN Cam Ryan,  MD   2 tablet at 08/12/19 1451   • sertraline (ZOLOFT) tablet 150 mg  150 mg Oral Daily Cam Ryan MD   150 mg at 08/12/19 1622   • sodium chloride 0.9 % flush 1-10 mL  1-10 mL Intravenous PRN Cam Ryan MD       • sodium chloride 0.9 % flush 3 mL  3 mL Intravenous Q12H Cam Ryan MD           Assessment/Plan       DJD (degenerative joint disease) of knee  Hypertension  Hyperlipidemia  History of depression  Obstructive sleep apnea  GERD  History of fatty liver    So far she looks excellent postop.  Blood pressures are reasonable.  A little on the high side.  Nothing that needs to be addressed right now.  She has plans for rehabilitation at [facility] where she rehabilitated her prior knee replacement.  She lives alone I think that is an excellent plan for her.  Typically blood pressure will go down postop and we will see if that is the case for her.  She will continue on H2 blockers.  I will be happy to follow her along with you.    John Cowan MD  08/12/19  5:40 PM

## 2019-08-12 NOTE — ANESTHESIA PROCEDURE NOTES
Airway  Urgency: elective    Airway not difficult    General Information and Staff    Patient location during procedure: OR  Anesthesiologist: Sherin Ngo MD  CRNA: Chika Waters CRNA    Indications and Patient Condition  Indications for airway management: airway protection    Preoxygenated: yes  MILS maintained throughout  Mask difficulty assessment: 1 - vent by mask    Final Airway Details  Final airway type: endotracheal airway      Successful airway: ETT  Cuffed: yes   Successful intubation technique: direct laryngoscopy  Facilitating devices/methods: Bougie  Endotracheal tube insertion site: oral  Blade: Edward  Blade size: 3  ETT size (mm): 7.0  Cormack-Lehane Classification: grade IIb - view of arytenoids or posterior of glottis only  Placement verified by: chest auscultation and capnometry   Cuff volume (mL): 6  Measured from: lips  ETT to lips (cm): 22  Number of attempts at approach: 1    Additional Comments  Atraumatic ET Tube placement.  Teeth as pre-op. BLEBS.  -ABD sounds.  +ET CO2.  Secured to face

## 2019-08-12 NOTE — ANESTHESIA PREPROCEDURE EVALUATION
Anesthesia Evaluation     Patient summary reviewed   NPO Solid Status: > 8 hours  NPO Liquid Status: > 6 hours           Airway   Mallampati: III  TM distance: <3 FB  Dental      Pulmonary    (+) sleep apnea,   Cardiovascular     ECG reviewed  Rhythm: regular  Rate: normal    (+) hypertension, hyperlipidemia,       Neuro/Psych  (+) psychiatric history Anxiety and Depression,     GI/Hepatic/Renal/Endo    (+) morbid obesity, GERD,  renal disease stones,     ROS Comment: IBS    Musculoskeletal     Abdominal    Substance History      OB/GYN          Other   (+) arthritis         Phys Exam Other: Implants upper and lower in front.                Anesthesia Plan    ASA 3     general     intravenous induction   Anesthetic plan, all risks, benefits, and alternatives have been provided, discussed and informed consent has been obtained with: patient.

## 2019-08-13 LAB
ANION GAP SERPL CALCULATED.3IONS-SCNC: 9.2 MMOL/L (ref 5–15)
BUN BLD-MCNC: 14 MG/DL (ref 8–23)
BUN/CREAT SERPL: 22.6 (ref 7–25)
CALCIUM SPEC-SCNC: 8.8 MG/DL (ref 8.6–10.5)
CHLORIDE SERPL-SCNC: 97 MMOL/L (ref 98–107)
CO2 SERPL-SCNC: 25.8 MMOL/L (ref 22–29)
CREAT BLD-MCNC: 0.62 MG/DL (ref 0.57–1)
GFR SERPL CREATININE-BSD FRML MDRD: 98 ML/MIN/1.73
GLUCOSE BLD-MCNC: 153 MG/DL (ref 65–99)
HCT VFR BLD AUTO: 35.5 % (ref 34–46.6)
HGB BLD-MCNC: 11.3 G/DL (ref 12–15.9)
POTASSIUM BLD-SCNC: 3.8 MMOL/L (ref 3.5–5.2)
SODIUM BLD-SCNC: 132 MMOL/L (ref 136–145)

## 2019-08-13 PROCEDURE — 25010000003 CEFAZOLIN IN DEXTROSE 2-4 GM/100ML-% SOLUTION: Performed by: ORTHOPAEDIC SURGERY

## 2019-08-13 PROCEDURE — 25010000002 FONDAPARINUX PER 0.5 MG: Performed by: ORTHOPAEDIC SURGERY

## 2019-08-13 PROCEDURE — 99232 SBSQ HOSP IP/OBS MODERATE 35: CPT | Performed by: INTERNAL MEDICINE

## 2019-08-13 PROCEDURE — 85014 HEMATOCRIT: CPT | Performed by: ORTHOPAEDIC SURGERY

## 2019-08-13 PROCEDURE — 25010000002 KETOROLAC TROMETHAMINE PER 15 MG: Performed by: ORTHOPAEDIC SURGERY

## 2019-08-13 PROCEDURE — 63710000001 DIPHENHYDRAMINE PER 50 MG: Performed by: NURSE PRACTITIONER

## 2019-08-13 PROCEDURE — 80048 BASIC METABOLIC PNL TOTAL CA: CPT | Performed by: ORTHOPAEDIC SURGERY

## 2019-08-13 PROCEDURE — 97110 THERAPEUTIC EXERCISES: CPT

## 2019-08-13 PROCEDURE — 97150 GROUP THERAPEUTIC PROCEDURES: CPT

## 2019-08-13 PROCEDURE — 85018 HEMOGLOBIN: CPT | Performed by: ORTHOPAEDIC SURGERY

## 2019-08-13 RX ORDER — DIPHENHYDRAMINE HCL 25 MG
25 CAPSULE ORAL EVERY 6 HOURS PRN
Status: DISCONTINUED | OUTPATIENT
Start: 2019-08-13 | End: 2019-08-15 | Stop reason: HOSPADM

## 2019-08-13 RX ORDER — FONDAPARINUX SODIUM 2.5 MG/.5ML
2.5 INJECTION SUBCUTANEOUS ONCE
Status: COMPLETED | OUTPATIENT
Start: 2019-08-13 | End: 2019-08-13

## 2019-08-13 RX ADMIN — OXYCODONE HYDROCHLORIDE AND ACETAMINOPHEN 2 TABLET: 5; 325 TABLET ORAL at 20:39

## 2019-08-13 RX ADMIN — OXYCODONE HYDROCHLORIDE AND ACETAMINOPHEN 2 TABLET: 5; 325 TABLET ORAL at 11:59

## 2019-08-13 RX ADMIN — CEFAZOLIN SODIUM 2 G: 2 INJECTION, SOLUTION INTRAVENOUS at 02:26

## 2019-08-13 RX ADMIN — SODIUM CHLORIDE, POTASSIUM CHLORIDE, SODIUM LACTATE AND CALCIUM CHLORIDE 100 ML/HR: 600; 310; 30; 20 INJECTION, SOLUTION INTRAVENOUS at 06:19

## 2019-08-13 RX ADMIN — FERROUS SULFATE TAB 325 MG (65 MG ELEMENTAL FE) 325 MG: 325 (65 FE) TAB at 07:54

## 2019-08-13 RX ADMIN — SODIUM CHLORIDE, PRESERVATIVE FREE 3 ML: 5 INJECTION INTRAVENOUS at 07:54

## 2019-08-13 RX ADMIN — OXYCODONE HYDROCHLORIDE AND ACETAMINOPHEN 2 TABLET: 5; 325 TABLET ORAL at 04:18

## 2019-08-13 RX ADMIN — OXYCODONE HYDROCHLORIDE AND ACETAMINOPHEN 2 TABLET: 5; 325 TABLET ORAL at 15:57

## 2019-08-13 RX ADMIN — OXYCODONE HYDROCHLORIDE AND ACETAMINOPHEN 2 TABLET: 5; 325 TABLET ORAL at 07:53

## 2019-08-13 RX ADMIN — DIPHENHYDRAMINE HYDROCHLORIDE 25 MG: 25 CAPSULE ORAL at 18:20

## 2019-08-13 RX ADMIN — FAMOTIDINE 20 MG: 20 TABLET, FILM COATED ORAL at 07:54

## 2019-08-13 RX ADMIN — ATORVASTATIN CALCIUM 20 MG: 20 TABLET, FILM COATED ORAL at 07:54

## 2019-08-13 RX ADMIN — SERTRALINE 150 MG: 100 TABLET, FILM COATED ORAL at 07:54

## 2019-08-13 RX ADMIN — KETOROLAC TROMETHAMINE 30 MG: 30 INJECTION, SOLUTION INTRAMUSCULAR at 02:25

## 2019-08-13 RX ADMIN — FONDAPARINUX SODIUM 2.5 MG: 2.5 INJECTION, SOLUTION SUBCUTANEOUS at 10:12

## 2019-08-13 RX ADMIN — ASPIRIN 325 MG: 325 TABLET ORAL at 08:00

## 2019-08-13 RX ADMIN — KETOROLAC TROMETHAMINE 30 MG: 30 INJECTION, SOLUTION INTRAMUSCULAR at 07:54

## 2019-08-13 RX ADMIN — SODIUM CHLORIDE, PRESERVATIVE FREE 3 ML: 5 INJECTION INTRAVENOUS at 20:39

## 2019-08-13 NOTE — PLAN OF CARE
Problem: Patient Care Overview  Goal: Plan of Care Review  Outcome: Ongoing (interventions implemented as appropriate)   08/13/19 2213   Coping/Psychosocial   Plan of Care Reviewed With patient;family   OTHER   Outcome Summary incr amb dist w/encouragement, forw flexed, pnful gt; plans SNU at [place]

## 2019-08-13 NOTE — PLAN OF CARE
Problem: Patient Care Overview  Goal: Plan of Care Review  Outcome: Ongoing (interventions implemented as appropriate)    Goal: Individualization and Mutuality  Outcome: Ongoing (interventions implemented as appropriate)    Goal: Discharge Needs Assessment  Outcome: Ongoing (interventions implemented as appropriate)    Goal: Interprofessional Rounds/Family Conf  Outcome: Ongoing (interventions implemented as appropriate)      Problem: Fall Risk (Adult)  Goal: Absence of Fall  Outcome: Ongoing (interventions implemented as appropriate)      Problem: Knee Arthroplasty (Total, Partial) (Adult)  Goal: Signs and Symptoms of Listed Potential Problems Will be Absent, Minimized or Managed (Knee Arthroplasty)  Outcome: Ongoing (interventions implemented as appropriate)

## 2019-08-13 NOTE — THERAPY TREATMENT NOTE
Acute Care - Physical Therapy Treatment Note  [facility]     Patient Name: Ann-Marie Eaton  : 1960  MRN: [id number]  Today's Date: 2019             Admit Date: 2019    Visit Dx:  No diagnosis found.  Patient Active Problem List   Diagnosis   • Osteoarthritis, knee   • Hypertension   • SHANNAN (obstructive sleep apnea)   • IBS (irritable bowel syndrome)   • Gastric reflux   • Fatty liver disease, nonalcoholic   • Vitamin D deficiency   • DDD (degenerative disc disease), lumbosacral   • Hyperlipidemia   • Kidney stone   • Depression   • Anxiety, generalized   • GERD (gastroesophageal reflux disease)   • Dental will-implantitis   • Leukocytosis   • DJD (degenerative joint disease) of knee       Therapy Treatment    Rehabilitation Treatment Summary     Row Name 19 1520 19 1040          Treatment Time/Intention    Discipline  physical therapy assistant  -  physical therapy assistant  -     Document Type  therapy note (daily note)  -  therapy note (daily note)  -     Subjective Information  complains of;weakness;fatigue;pain;swelling  -  complains of;weakness;fatigue;pain;swelling  -     Mode of Treatment  group therapy;physical therapy  -  group therapy;physical therapy  -     Care Plan Review  patient/other agree to care plan  -  patient/other agree to care plan  -     Care Plan Review, Other Participant(s)  --  family  -     Existing Precautions/Restrictions  fall  -  fall  -     Recorded by [] Indira Benitez, [facility] [place] [facility] [JM] Indira Benitez, [facility] [place] 19     Row Name 19 1520 19 1040          Sit-Stand Transfer    Sit-Stand [place] (Transfers)  contact guard;verbal cues  -  contact guard;verbal cues  -     Assistive Device (Sit-Stand Transfers)  walker, front-wheeled  -  walker, front-wheeled  -     Recorded by [] Indira Benitez, [facility] [place] 19 [] Indira Benitez [facility] [place] 19     Row Name 19  1040          Gait/Stairs Assessment/Training    [place] Level (Gait)  contact guard  -JM  contact guard  -JM     Assistive Device (Gait)  walker, front-wheeled  -JM  walker, front-wheeled  -JM     Distance in Feet (Gait)  40  -JM  30  -JM     Pattern (Gait)  step-to  -JM  step-to  -JM     Deviations/Abnormal Patterns (Gait)  antalgic;base of support, wide;tiffanie decreased;stride length decreased  -JM  antalgic;base of support, wide;tiffanie decreased;stride length decreased  -JM     Bilateral Gait Deviations  forward flexed posture  -JM  forward flexed posture  -JM     Comment (Gait/Stairs)  fatigue and pain limiting incr dist  -JM  --     Recorded by [JM] Indira Benitez, SHAJI 08/13/19 1901 [] Indira Benitez [facility] 08/13/19 1856     Row Name 08/13/19 1040             General ROM    GENERAL ROM COMMENTS  -6-72  -JM      Recorded by [JM] Indira Benitez, SHAJI 08/13/19 1856      Row Name 08/13/19 1520 08/13/19 1040          Therapeutic Exercise    Comment (Therapeutic Exercise)  TKR protocol x 20 reps w/assist  -JM  TKR protocol x 15 reps , assist to initiate SLRs  -JM     Recorded by [JM] Indira Benitez, SHAJI 08/13/19 1901 [JM] Indira Benitez, [facility] 08/13/19 1856     Row Name 08/13/19 1520 08/13/19 1040          Positioning and Restraints    Pre-Treatment Position  sitting in chair/recliner  -JM  sitting in chair/recliner  -JM     In Chair  reclined;call light within reach;encouraged to call for assist;exit alarm on;notified nsg  -JM  reclined;call light within reach;encouraged to call for assist;exit alarm on;with family/caregiver  -JM     Recorded by [JM] Indira Benitez PTA 08/13/19 1901 [] Indira Benitez [facility] 08/13/19 1856     Row Name 08/13/19 1520 08/13/19 1040          Pain Scale: Numbers Pre/Post-Treatment    Pain Scale: Numbers, Pretreatment  8/10  -JM  7/10  -JM     Pain Scale: Numbers, Post-Treatment  10/10  -JM  8/10  -JM     Pain Location - Side  Right  -JM  Right  -JM     Pain Location   knee  -JM  knee  -     Pain Intervention(s)  Medication (See MAR);Repositioned;Cold applied;Elevated  -JM  Medication (See MAR);Repositioned;Cold applied  -     Recorded by [ALVARO] Indira Benitez, SHAJI 08/13/19 1901 [ALVARO] Indira Benitez, SHAJI 08/13/19 1856     Row Name                Wound 08/12/19 1050 Right knee Incision    Wound - Properties Group Date first assessed: 08/12/19 [MB] Time first assessed: 1050 [MB] Side: Right [MB] Location: knee [MB] Primary Wound Type: Incision [MB] Recorded by:  [MB] Sofie Rodriguez RN 08/12/19 1050      User Key  (r) = Recorded By, (t) = Taken By, (c) = Cosigned By    Initials Name Effective Dates Discipline    Sofie Spencer RN 06/16/16 -  Nurse    Indira Wilson, PTA 03/07/18 -  PT          Wound 08/12/19 1050 Right knee Incision (Active)   Dressing Appearance dry;intact;no drainage 8/13/2019  4:00 PM   Closure RODY 8/13/2019  4:00 PM   Base dressing in place, unable to visualize 8/13/2019  4:00 PM   Drainage Amount none 8/13/2019  4:00 PM   Dressing Care, Wound dressing changed 8/13/2019  4:00 PM           Physical Therapy Education     Title: PT OT SLP Therapies (Done)     Topic: Physical Therapy (Done)     Point: Mobility training (Done)     Learning Progress Summary           Patient Acceptance, E,TB,D, VU,NR by ALVARO at 8/13/2019  6:57 PM    Acceptance, E,D, NR,VU by MARJAN at 8/12/2019  4:16 PM   Family Acceptance, E,TB,D, VU,NR by ALVARO at 8/13/2019  6:57 PM                   Point: Home exercise program (Done)     Learning Progress Summary           Patient Acceptance, E,TB,D, VU,NR by ALVARO at 8/13/2019  6:57 PM    Acceptance, E,D, NR,VU by MARJAN at 8/12/2019  4:16 PM   Family Acceptance, E,TB,D, VU,NR by ALVARO at 8/13/2019  6:57 PM                   Point: Body mechanics (Done)     Learning Progress Summary           Patient Acceptance, E,TB,D, VU,NR by ALVARO at 8/13/2019  6:57 PM    Acceptance, MILY MCKINNEY NR,VU by MARJAN at 8/12/2019  4:16 PM   Family Acceptance, SELAM MCKINNEY D, PAUL,NR by ALVARO at  8/13/2019  6:57 PM                   Point: Precautions (Done)     Learning Progress Summary           Patient Acceptance, E,TB,D, VU,NR by  at 8/13/2019  6:57 PM    Acceptance, E,D, NR,VU by  at 8/12/2019  4:16 PM   Family Acceptance, E,TB,D, VU,NR by  at 8/13/2019  6:57 PM                               User Key     Initials Effective Dates Name Provider Type Discipline     04/03/18 -  Amanda Valencia, PT Physical Therapist PT     03/07/18 -  Indira Benitez PTA Physical Therapy Assistant PT                PT Recommendation and Plan     Plan of Care Reviewed With: patient, family  Outcome Summary: incr amb dist w/encouragement, forw flexed, pnful gt; plans SNU at [place]  Outcome Measures     Row Name 08/13/19 1800             How much help from another person do you currently need...    Turning from your back to your side while in flat bed without using bedrails?  3  -JM      Moving from lying on back to sitting on the side of a flat bed without bedrails?  3  -JM      Moving to and from a bed to a chair (including a wheelchair)?  3  -JM      Standing up from a chair using your arms (e.g., wheelchair, bedside chair)?  3  -JM      Climbing 3-5 steps with a railing?  1  -JM      To walk in hospital room?  3  -JM      AM-PAC 6 Clicks Score (PT)  16  -        User Key  (r) = Recorded By, (t) = Taken By, (c) = Cosigned By    Initials Name Provider Type     Indira Benitez PTA Physical Therapy Assistant         Time Calculation:   PT Charges     Row Name 08/13/19 1901 08/13/19 1858          Time Calculation    Start Time  1520  -  1040  -     Stop Time  1554  -  1135  -     Time Calculation (min)  34 min  -  55 min  -     PT Received On  08/13/19  -  08/13/19  -     PT - Next Appointment  08/14/19  -  08/13/19  -        Time Calculation- PT    Total Timed Code Minutes- PT  20 minute(s)  -JM  25 minute(s)  -       User Key  (r) = Recorded By, (t) = Taken By, (c) = Cosigned By    Initials  Name Provider Type    Indira Wilson PTA Physical Therapy Assistant        Therapy Charges for Today     Code Description Service Date Service Provider Modifiers Qty    97806862493 HC PT THER PROC EA 15 MIN 8/13/2019 Indira Benitez PTA GP 2    67941749498 HC PT THER PROC GROUP 8/13/2019 Indira Benitez PTA GP 1    49095856563 HC PT THER PROC EA 15 MIN 8/13/2019 Indira Benitez PTA GP 1    33335134943 HC PT THER PROC GROUP 8/13/2019 Indira Benitez PTA GP 1          PT G-Codes  Outcome Measure Options: AM-PAC 6 Clicks Basic Mobility (PT)  AM-PAC 6 Clicks Score (PT): 16    Indira Benitez PTA  8/13/2019

## 2019-08-13 NOTE — PROGRESS NOTES
"   LOS: 1 day    Patient Care Team:  John Cowan MD as PCP - General (Internal Medicine)  John Cowan MD as PCP - Internal Medicine  Gildardo Goode MD as Consulting Physician (Urology)    Chief Complaint: Postop knee replacement  Subjective     Interval History:     Patient Complaints: Patient is postop knee replacement.  She is taking a few steps from bed to chair so far.  Up multiple times last night going to the restroom.  Pain control is excellent.  No cardiovascular complaints or GI complaints.    Objective     Vital Signs  Temp:  [96.8 °F (36 °C)-98.5 °F (36.9 °C)] 96.8 °F (36 °C)  Heart Rate:  [54-82] 55  Resp:  [14-18] 16  BP: ()/(60-99) 96/60    Flowsheet Rows      First Filed Value   Admission Height  162.6 cm (64\") Documented at 08/12/2019 0758   Admission Weight  115 kg (254 lb 5 oz) Documented at 08/12/2019 0758          No intake/output data recorded.  I/O last 3 completed shifts:  In: 1129.3 [I.V.:1129.3]  Out: 1050 [Urine:800; Drains:100; Blood:150]    Intake/Output Summary (Last 24 hours) at 8/13/2019 0751  Last data filed at 8/13/2019 0606  Gross per 24 hour   Intake 1129.25 ml   Output 1050 ml   Net 79.25 ml       Physical Exam:   General Appearance:    Alert, cooperative, in no acute distress       Eyes:            Conjunctivae and sclerae normal, no   icterus, PERRLA   Neck:   No adenopathy, supple, trachea midline       Lungs:     Clear to auscultation,respirations regular, even and                  unlabored    Heart:    Regular rhythm and normal rate, normal S1 and S2, no            murmur, no gallop, no rub, no click       Abdomen:     Normal bowel sounds, no masses, no organomegaly, soft        non-tender, non-distended, no guarding, no rebound                tenderness       Extremities:    Pulses:        Lymph nodes:            Results Review:    I reviewed the patient's new clinical results.  Results from last 7 days   Lab Units 08/13/19  0347 08/08/19  1443 "   SODIUM mmol/L 132* 142   POTASSIUM mmol/L 3.8 3.9   CHLORIDE mmol/L 97* 105   CO2 mmol/L 25.8 26.3   BUN mg/dL 14 9   CREATININE mg/dL 0.62 0.60   CALCIUM mg/dL 8.8 9.2   BILIRUBIN mg/dL  --  0.7   ALK PHOS U/L  --  104   ALT (SGPT) U/L  --  17   AST (SGOT) U/L  --  16   GLUCOSE mg/dL 153* 111*       Estimated Creatinine Clearance: 118.5 mL/min (by C-G formula based on SCr of 0.62 mg/dL).                Results from last 7 days   Lab Units 08/13/19  0347 08/08/19  1444   WBC 10*3/mm3  --  8.12   HEMOGLOBIN g/dL 11.3* 13.1   PLATELETS 10*3/mm3  --  215       Results from last 7 days   Lab Units 08/08/19  1443   INR  1.05         Imaging Results (last 24 hours)     Procedure Component Value Units Date/Time    XR Knee 1 or 2 View Right [[id number]] Collected:  08/12/19 1256     Updated:  08/12/19 1259    Narrative:       XR KNEE 1 OR 2 VW RIGHT-     POSTOP PORTABLE 2 VIEWS RIGHT KNEE     CLINICAL INFORMATION: Post arthroplasty     FINDINGS: Prosthesis is satisfactory in position. A complicating process  is not identified.     This report was finalized on 8/12/2019 12:56 PM by Dr. Ronnie Mckay M.D.             aspirin 325 mg Oral Daily   atorvastatin 20 mg Oral Daily   diltiaZEM  mg Oral QAM   famotidine 20 mg Oral Daily   ferrous sulfate 325 mg Oral Daily With Breakfast   ketorolac 30 mg Intravenous Q6H   lisinopril 10 mg Oral QAM   sertraline 150 mg Oral Daily   sodium chloride 3 mL Intravenous Q12H       lactated ringers 9 mL/hr Last Rate: 9 mL/hr (08/12/19 0825)   lactated ringers 100 mL/hr Last Rate: 100 mL/hr (08/13/19 0619)   lactated ringers 100 mL/hr        Medication Review:   Current Facility-Administered Medications   Medication Dose Route Frequency Provider Last Rate Last Dose   • acetaminophen (TYLENOL) tablet 650 mg  650 mg Oral Q4H PRN Cam Ryan MD        Or   • acetaminophen (TYLENOL) 160 MG/5ML solution 650 mg  650 mg Oral Q4H PRN Cam Ryan MD        Or   • acetaminophen  (TYLENOL) suppository 650 mg  650 mg Rectal Q4H PRN Cam Ryan MD       • acetaminophen (TYLENOL) tablet 325 mg  325 mg Oral Q4H PRN Cam Ryan MD       • aspirin tablet 325 mg  325 mg Oral Daily Cam Ryan MD   325 mg at 08/12/19 1622   • atorvastatin (LIPITOR) tablet 20 mg  20 mg Oral Daily Cam Ryan MD   20 mg at 08/12/19 1622   • bisacodyl (DULCOLAX) EC tablet 10 mg  10 mg Oral Daily PRN Cam Ryan MD       • bisacodyl (DULCOLAX) suppository 10 mg  10 mg Rectal Daily PRN Cam Ryan MD       • diltiaZEM CD (CARDIZEM CD) 24 hr capsule 240 mg  240 mg Oral QAM Cam Ryan MD   Stopped at 08/13/19 0715   • famotidine (PEPCID) tablet 20 mg  20 mg Oral Daily Cam Ryan MD   20 mg at 08/12/19 1622   • ferrous sulfate tablet 325 mg  325 mg Oral Daily With Breakfast Cam Ryan MD       • HYDROmorphone (DILAUDID) injection 1 mg  1 mg Intramuscular Q4H PRN Cam Ryan MD        And   • naloxone (NARCAN) injection 0.1 mg  0.1 mg Intravenous Q5 Min PRN Cam Ryan MD       • ketorolac (TORADOL) injection 30 mg  30 mg Intravenous Q6H Cam Ryan MD   30 mg at 08/13/19 0225   • lactated ringers infusion  9 mL/hr Intravenous Continuous Sherin Ngo MD 9 mL/hr at 08/12/19 0825 9 mL/hr at 08/12/19 0825   • lactated ringers infusion  100 mL/hr Intravenous Continuous Cam Ryan  mL/hr at 08/13/19 0619 100 mL/hr at 08/13/19 0619   • lactated ringers infusion  100 mL/hr Intravenous Continuous Chika Waters CRNA       • lisinopril (PRINIVIL,ZESTRIL) tablet 10 mg  10 mg Oral QAM Cam Ryan MD   Stopped at 08/13/19 0716   • magnesium hydroxide (MILK OF MAGNESIA) suspension 2400 mg/10mL 10 mL  10 mL Oral Daily PRN Cam Ryan MD       • oxyCODONE-acetaminophen (PERCOCET) 5-325 MG per tablet 1 tablet  1 tablet Oral Q4H PRN Cam Ryan MD   1 tablet at 08/12/19 1043   • oxyCODONE-acetaminophen (PERCOCET) 5-325 MG per  tablet 2 tablet  2 tablet Oral Q4H PRN Cam Ryan MD   2 tablet at 08/13/19 9984   • sertraline (ZOLOFT) tablet 150 mg  150 mg Oral Daily Cam Ryan MD   150 mg at 08/12/19 1622   • sodium chloride 0.9 % flush 1-10 mL  1-10 mL Intravenous PRN Cam Ryan MD       • sodium chloride 0.9 % flush 3 mL  3 mL Intravenous Q12H Cam Ryan MD   3 mL at 08/12/19 6635       Assessment/Plan       DJD (degenerative joint disease) of knee  Hypertension  Hyperlipidemia  History of depression  Obstructive sleep apnea  GERD  History of fatty liver    So far she looks excellent postop.  Blood pressures have declined exit expected.  She has plans for rehabilitation at [facility] where she rehabilitated her prior knee replacement.  She lives alone I think that is an excellent plan for her.  Rehab will be able to monitor her blood pressure and add back her hypertensive when indicated.  Typically that occurs within the first week or so following surgery.  She will continue on H2 blockers.  I will be happy to follow her along with you.    John Cowan MD  08/13/19  7:51 AM

## 2019-08-13 NOTE — DISCHARGE PLACEMENT REQUEST
"Alexia Eatonn D (59 y.o. Female)     Date of Birth Social Security Number Address Home Phone MRN    1960  [address] [place], [place] [id number] [phone] [id number]    Mandaeism Marital Status          Scientology         Admission Date Admission Type Admitting Provider Attending Provider Department, Room/Bed    8/12/19 Elective Cam Ryan MD Sweet, Richard A, MD [address], P778/1    Discharge Date Discharge Disposition Discharge Destination                       Attending Provider:  Cam Ryan MD    Allergies:  Grass, Other, Pollen Extract, Strawberry Extract, Sulfa Antibiotics, Tree Extract, Darvon [Propoxyphene]    Isolation:  None   Infection:  None   Code Status:  CPR    Ht:  162.6 cm (64\")   Wt:  115 kg (254 lb 5 oz)    Admission Cmt:  None   Principal Problem:  None                Active Insurance as of 8/12/2019     Primary Coverage     Payor Plan Insurance Group Employer/Plan Group    [employer] [id number]     Payor Plan Address Payor Plan Phone Number Payor Plan Fax Number Effective Dates    [address] [phone]  1/1/2015 - None Entered    [place] [address]       Subscriber Name Subscriber Birth Date Member ID       KAREN EATON 1960 [id number]                 Emergency Contacts      (Rel.) Home Phone Work Phone Mobile Phone    Erum Carlson (Daughter) [phone] -- [phone]    Duke Carlson (SON IN LAW) (Relative) -- -- [phone]    Feliberto Carlson (Friend) -- -- [phone]              "

## 2019-08-13 NOTE — THERAPY TREATMENT NOTE
Acute Care - Physical Therapy Treatment Note  [facility]     Patient Name: Ann-Marie Eaton  : 1960  MRN: [id number]  Today's Date: 2019             Admit Date: 2019    Visit Dx:  No diagnosis found.  Patient Active Problem List   Diagnosis   • Osteoarthritis, knee   • Hypertension   • SHANNAN (obstructive sleep apnea)   • IBS (irritable bowel syndrome)   • Gastric reflux   • Fatty liver disease, nonalcoholic   • Vitamin D deficiency   • DDD (degenerative disc disease), lumbosacral   • Hyperlipidemia   • Kidney stone   • Depression   • Anxiety, generalized   • GERD (gastroesophageal reflux disease)   • Dental will-implantitis   • Leukocytosis   • DJD (degenerative joint disease) of knee       Therapy Treatment    Rehabilitation Treatment Summary     Row Name 19 1040             Treatment Time/Intention    Discipline  physical therapy assistant  -      Document Type  therapy note (daily note)  -      Subjective Information  complains of;weakness;fatigue;pain;swelling  -      Mode of Treatment  group therapy;physical therapy  -      Care Plan Review  patient/other agree to care plan  -      Care Plan Review, Other Participant(s)  family  -      Existing Precautions/Restrictions  fall  -      Recorded by [] Indira Benitez [facility] 19 185      Row Name 19 1040             Sit-Stand Transfer    Sit-Stand [place] (Transfers)  contact guard;verbal cues  -      Assistive Device (Sit-Stand Transfers)  walker, front-wheeled  -      Recorded by [] Indira Benitez PTA 19 185      Row Name 19 1040             Gait/Stairs Assessment/Training    [place] Level (Gait)  contact guard  -      Assistive Device (Gait)  walker, front-wheeled  -      Distance in Feet (Gait)  30  -      Pattern (Gait)  step-to  -      Deviations/Abnormal Patterns (Gait)  antalgic;base of support, wide;tiffanie decreased;stride length decreased  -      Bilateral Gait  Deviations  forward flexed posture  -JM      Recorded by [JM] Indira Benitez PTA 08/13/19 1856      Row Name 08/13/19 1040             General ROM    GENERAL ROM COMMENTS  -6-72  -JM      Recorded by [JM] Indira Benitez PTA 08/13/19 1856      Row Name 08/13/19 1040             Therapeutic Exercise    Comment (Therapeutic Exercise)  TKR protocol x 15 reps , assist to initiate SLRs  -JM      Recorded by [JM] Indira Benitez PTA 08/13/19 1856      Row Name 08/13/19 1040             Positioning and Restraints    Pre-Treatment Position  sitting in chair/recliner  -JM      In Chair  reclined;call light within reach;encouraged to call for assist;exit alarm on;with family/caregiver  -JM      Recorded by [] Indira Benitez PTA 08/13/19 1856      Row Name 08/13/19 1040             Pain Scale: Numbers Pre/Post-Treatment    Pain Scale: Numbers, Pretreatment  7/10  -JM      Pain Scale: Numbers, Post-Treatment  8/10  -JM      Pain Location - Side  Right  -JM      Pain Location  knee  -JM      Pain Intervention(s)  Medication (See MAR);Repositioned;Cold applied  -JM      Recorded by [JM] Indira Benitez PTA 08/13/19 1856      Row Name                Wound 08/12/19 1050 Right knee Incision    Wound - Properties Group Date first assessed: 08/12/19 [MB] Time first assessed: 1050 [MB] Side: Right [MB] Location: knee [MB] Primary Wound Type: Incision [MB] Recorded by:  [MB] Sofie Rodriguez RN 08/12/19 1050      User Key  (r) = Recorded By, (t) = Taken By, (c) = Cosigned By    Initials Name Effective Dates Discipline    Sofie Spencer, RN 06/16/16 -  Nurse    Indira Wilson, [facility] 03/07/18 -  PT          Wound 08/12/19 1050 Right knee Incision (Active)   Dressing Appearance dry;intact;no drainage 8/13/2019  4:00 PM   Closure RODY 8/13/2019  4:00 PM   Base dressing in place, unable to visualize 8/13/2019  4:00 PM   Drainage Amount none 8/13/2019  4:00 PM   Dressing Care, Wound dressing changed 8/13/2019  4:00 PM            Physical Therapy Education     Title: PT OT SLP Therapies (Done)     Topic: Physical Therapy (Done)     Point: Mobility training (Done)     Learning Progress Summary           Patient Acceptance, E,TB,D, VU,NR by  at 8/13/2019  6:57 PM    Acceptance, E,D, NR,VU by  at 8/12/2019  4:16 PM   Family Acceptance, E,TB,D, VU,NR by  at 8/13/2019  6:57 PM                   Point: Home exercise program (Done)     Learning Progress Summary           Patient Acceptance, E,TB,D, VU,NR by  at 8/13/2019  6:57 PM    Acceptance, E,D, NR,VU by  at 8/12/2019  4:16 PM   Family Acceptance, E,TB,D, VU,NR by  at 8/13/2019  6:57 PM                   Point: Body mechanics (Done)     Learning Progress Summary           Patient Acceptance, E,TB,D, VU,NR by  at 8/13/2019  6:57 PM    Acceptance, E,D, NR,VU by  at 8/12/2019  4:16 PM   Family Acceptance, E,TB,D, VU,NR by  at 8/13/2019  6:57 PM                   Point: Precautions (Done)     Learning Progress Summary           Patient Acceptance, E,TB,D, VU,NR by  at 8/13/2019  6:57 PM    Acceptance, E,D, NR,VU by  at 8/12/2019  4:16 PM   Family Acceptance, E,TB,D, VU,NR by  at 8/13/2019  6:57 PM                               User Key     Initials Effective Dates Name Provider Type Discipline     04/03/18 -  Amanda Valencia, PT Physical Therapist PT     03/07/18 -  Indira Benitez PTA Physical Therapy Assistant PT                PT Recommendation and Plan     Plan of Care Reviewed With: patient, family  Outcome Summary: incr amb dist w/encouragement, forw flexed, pnful gt; plans SNU at [facility]  Outcome Measures     Row Name 08/13/19 1800             How much help from another person do you currently need...    Turning from your back to your side while in flat bed without using bedrails?  3  -JM      Moving from lying on back to sitting on the side of a flat bed without bedrails?  3  -JM      Moving to and from a bed to a chair (including a wheelchair)?  3  -JM       Standing up from a chair using your arms (e.g., wheelchair, bedside chair)?  3  -JM      Climbing 3-5 steps with a railing?  1  -JM      To walk in hospital room?  3  -JM      AM-PAC 6 Clicks Score (PT)  16  -        User Key  (r) = Recorded By, (t) = Taken By, (c) = Cosigned By    Initials Name Provider Type    Indira Wilson PTA Physical Therapy Assistant         Time Calculation:   PT Charges     Row Name 08/13/19 1858             Time Calculation    Start Time  1040  -      Stop Time  1135  -      Time Calculation (min)  55 min  -      PT Received On  08/13/19  -ALVARO      PT - Next Appointment  08/13/19  -ALVARO         Time Calculation- PT    Total Timed Code Minutes- PT  25 minute(s)  -        User Key  (r) = Recorded By, (t) = Taken By, (c) = Cosigned By    Initials Name Provider Type    Indira Wilson PTA Physical Therapy Assistant        Therapy Charges for Today     Code Description Service Date Service Provider Modifiers Qty    40462138430 HC PT THER PROC EA 15 MIN 8/13/2019 Indira Benitez PTA GP 2    84696869540 HC PT THER PROC GROUP 8/13/2019 Indira Benitez PTA GP 1          PT G-Codes  Outcome Measure Options: AM-PAC 6 Clicks Basic Mobility (PT)  AM-PAC 6 Clicks Score (PT): 16    Indira Benitez PTA  8/13/2019

## 2019-08-13 NOTE — PLAN OF CARE
Problem: Patient Care Overview  Goal: Plan of Care Review  Outcome: Ongoing (interventions implemented as appropriate)   08/13/19 6208   Coping/Psychosocial   Plan of Care Reviewed With patient   OTHER   Outcome Summary Pain controlled with PO pain medication. Ambulates with assist x1 and walker. VSS. Voiding without difficulty. DC to rehab when ready. Educated about BP monitoring.    Plan of Care Review   Progress improving     Goal: Individualization and Mutuality  Outcome: Ongoing (interventions implemented as appropriate)    Goal: Discharge Needs Assessment  Outcome: Ongoing (interventions implemented as appropriate)    Goal: Interprofessional Rounds/Family Conf  Outcome: Ongoing (interventions implemented as appropriate)      Problem: Fall Risk (Adult)  Goal: Absence of Fall  Outcome: Ongoing (interventions implemented as appropriate)      Problem: Knee Arthroplasty (Total, Partial) (Adult)  Goal: Signs and Symptoms of Listed Potential Problems Will be Absent, Minimized or Managed (Knee Arthroplasty)  Outcome: Ongoing (interventions implemented as appropriate)    Goal: Anesthesia/Sedation Recovery  Outcome: Outcome(s) achieved Date Met: 08/13/19

## 2019-08-13 NOTE — PROGRESS NOTES
"  Orthopaedic Surgery   Daily Progress Note  Dr. Cam Ryan Sr  [phone]  DEMOGRAPHICS:   · Ann-Marie Eaton   · Age:59 y.o.   · MRN:[id number]  · Admitted: 8/12/2019    PROCEDURE: 1 Day Post-Op s/p Procedure(s):  RIGHT TOTAL KNEE ARTHROPLASTY     BP 96/60 (BP Location: Right arm, Patient Position: Lying)   Pulse 55   Temp 96.8 °F (36 °C) (Oral)   Resp 16   Ht 162.6 cm (64\")   Wt 115 kg (254 lb 5 oz)   SpO2 94%   BMI 43.65 kg/m²     Lab Results (last 24 hours)     Procedure Component Value Units Date/Time    Basic Metabolic Panel [[id number]]  (Abnormal) Collected:  08/13/19 0347    Specimen:  Blood Updated:  08/13/19 0441     Glucose 153 mg/dL      BUN 14 mg/dL      Creatinine 0.62 mg/dL      Sodium 132 mmol/L      Potassium 3.8 mmol/L      Chloride 97 mmol/L      CO2 25.8 mmol/L      Calcium 8.8 mg/dL      eGFR Non African Amer 98 mL/min/1.73      BUN/Creatinine Ratio 22.6     Anion Gap 9.2 mmol/L     Narrative:       GFR Normal >60  Chronic Kidney Disease <60  Kidney Failure <15    Hemoglobin & Hematocrit, Blood [[id number]]  (Abnormal) Collected:  08/13/19 0347    Specimen:  Blood Updated:  08/13/19 0420     Hemoglobin 11.3 g/dL      Hematocrit 35.5 %           Imaging Results (last 24 hours)     Procedure Component Value Units Date/Time    XR Knee 1 or 2 View Right [[id number]] Collected:  08/12/19 1256     Updated:  08/12/19 1259    Narrative:       XR KNEE 1 OR 2 VW RIGHT-     POSTOP PORTABLE 2 VIEWS RIGHT KNEE     CLINICAL INFORMATION: Post arthroplasty     FINDINGS: Prosthesis is satisfactory in position. A complicating process  is not identified.     This report was finalized on 8/12/2019 12:56 PM by Dr. Ronnie Mckay M.D.             Patient Care Team:  John Cowan MD as PCP - General (Internal Medicine)  John Cowan MD as PCP - Internal Medicine  Gildardo Goode MD as Consulting Physician (Urology)    SUBJECTIVE  Comfortable    PHYSICAL EXAM  Neurovascular intact     ASSESSMENT: " Patient is a 59 y.o. female who is 1 Day Post-Op s/p Procedure(s):  1.  RIGHT TOTAL KNEE ARTHROPLASTY  2.  Hypertension  3.  Gastroesophageal reflux disease  4.  Left knee replacement 2016 doing well  5.  She has very little help at home and would like to go to rehab.  This was her postop course from her other knee replacement    PLAN / DISPOSITION:  Arixtra 2.5 mg subcutaneous one-time dose for DVT prophylaxis..  Aspirin.  Mobilize  Rehab discharge Thursday.    Cam Ryan Sr, MD  Orthopaedic Surgery  [facility]  [phone]

## 2019-08-13 NOTE — PROGRESS NOTES
Discharge Planning Assessment  [facility]     Patient Name: Ann-Marie Eaton  MRN: [id number]  Today's Date: 8/13/2019    Admit Date: 8/12/2019    Discharge Needs Assessment     Row Name 08/13/19 1535       Living Environment    Lives With  child(misty), adult    Current Living Arrangements  home/apartment/condo    Family Caregiver if Needed  friend(s);child(misty), adult    Quality of Family Relationships  involved       Transition Planning    Patient/Family Anticipates Transition to  inpatient rehabilitation facility    Transportation Anticipated  family or friend will provide       Discharge Needs Assessment    Readmission Within the Last 30 Days  no previous admission in last 30 days    Concerns to be Addressed  adjustment to diagnosis/illness    Equipment Currently Used at Home  walker, rolling    Discharge Facility/Level of Care Needs  rehabilitation facility        Discharge Plan     Row Name 08/13/19 1538       Plan    Plan  [facility] (pending Ezel cert)     Patient/Family in Agreement with Plan  yes    Plan Comments  Met w/ pt verified facesheet and dc plan. Pt would like to dc to [facility]; she registered w/ [facility] prior to surgert. Doreen/Antonio notified, [facility] will accept PENDING Ezel cert.,         Destination - Selection Complete      Service Provider Request Status Selected Services Address Phone Number Fax Number    [facility] [address] [place] [id number] [phone]       Rochelle Garcia RN 8/13/2019 1537    .8/13/2019 [facility] will accept pending Ezel isabell, Doreen/Antonio following .Rochelle Garcia RN                   Durable Medical Equipment      No service coordination in this encounter.      Dialysis/Infusion      No service coordination in this encounter.      Home Medical Care      No service coordination in this encounter.      Therapy      No service coordination in this encounter.      Community Resources       No service coordination in this encounter.          Demographic Summary    No documentation.       Functional Status     Row Name 08/13/19 1537       Functional Status    Usual Activity Tolerance  moderate    Current Activity Tolerance  fair       Functional Status, IADL    Medications  independent    Meal Preparation  independent    Housekeeping  assistive equipment    Laundry  independent    Shopping  assistive equipment        Psychosocial    No documentation.       Abuse/Neglect    No documentation.       Legal    No documentation.       Substance Abuse    No documentation.       Patient Forms    No documentation.           Rochelle Garcia RN

## 2019-08-14 PROCEDURE — 97110 THERAPEUTIC EXERCISES: CPT

## 2019-08-14 PROCEDURE — 97150 GROUP THERAPEUTIC PROCEDURES: CPT

## 2019-08-14 PROCEDURE — 63710000001 DIPHENHYDRAMINE PER 50 MG: Performed by: NURSE PRACTITIONER

## 2019-08-14 PROCEDURE — 99232 SBSQ HOSP IP/OBS MODERATE 35: CPT | Performed by: INTERNAL MEDICINE

## 2019-08-14 RX ORDER — IBUPROFEN 400 MG/1
400 TABLET ORAL EVERY 6 HOURS PRN
Status: DISCONTINUED | OUTPATIENT
Start: 2019-08-14 | End: 2019-08-15 | Stop reason: HOSPADM

## 2019-08-14 RX ORDER — ASPIRIN 325 MG
325 TABLET ORAL DAILY
Qty: 42 TABLET | Refills: 0 | Status: SHIPPED | OUTPATIENT
Start: 2019-08-14 | End: 2019-09-09

## 2019-08-14 RX ORDER — HYDROCODONE BITARTRATE AND ACETAMINOPHEN 7.5; 325 MG/1; MG/1
1 TABLET ORAL EVERY 4 HOURS PRN
Status: DISCONTINUED | OUTPATIENT
Start: 2019-08-14 | End: 2019-08-15 | Stop reason: HOSPADM

## 2019-08-14 RX ADMIN — DIPHENHYDRAMINE HYDROCHLORIDE 25 MG: 25 CAPSULE ORAL at 00:07

## 2019-08-14 RX ADMIN — FAMOTIDINE 20 MG: 20 TABLET, FILM COATED ORAL at 09:22

## 2019-08-14 RX ADMIN — HYDROCODONE BITARTRATE AND ACETAMINOPHEN 1 TABLET: 7.5; 325 TABLET ORAL at 23:55

## 2019-08-14 RX ADMIN — ACETAMINOPHEN 325 MG: 325 TABLET, FILM COATED ORAL at 19:59

## 2019-08-14 RX ADMIN — HYDROCODONE BITARTRATE AND ACETAMINOPHEN 1 TABLET: 7.5; 325 TABLET ORAL at 09:28

## 2019-08-14 RX ADMIN — ATORVASTATIN CALCIUM 20 MG: 20 TABLET, FILM COATED ORAL at 09:22

## 2019-08-14 RX ADMIN — DIPHENHYDRAMINE HYDROCHLORIDE 25 MG: 25 CAPSULE ORAL at 06:04

## 2019-08-14 RX ADMIN — LISINOPRIL 10 MG: 10 TABLET ORAL at 09:22

## 2019-08-14 RX ADMIN — HYDROCODONE BITARTRATE AND ACETAMINOPHEN 1 TABLET: 7.5; 325 TABLET ORAL at 14:34

## 2019-08-14 RX ADMIN — FERROUS SULFATE TAB 325 MG (65 MG ELEMENTAL FE) 325 MG: 325 (65 FE) TAB at 09:22

## 2019-08-14 RX ADMIN — HYDROCODONE BITARTRATE AND ACETAMINOPHEN 1 TABLET: 7.5; 325 TABLET ORAL at 19:59

## 2019-08-14 RX ADMIN — SODIUM CHLORIDE, PRESERVATIVE FREE 3 ML: 5 INJECTION INTRAVENOUS at 09:26

## 2019-08-14 RX ADMIN — SODIUM CHLORIDE, PRESERVATIVE FREE 3 ML: 5 INJECTION INTRAVENOUS at 22:38

## 2019-08-14 RX ADMIN — OXYCODONE HYDROCHLORIDE AND ACETAMINOPHEN 1 TABLET: 5; 325 TABLET ORAL at 01:07

## 2019-08-14 RX ADMIN — SERTRALINE 150 MG: 100 TABLET, FILM COATED ORAL at 09:22

## 2019-08-14 RX ADMIN — ASPIRIN 325 MG: 325 TABLET ORAL at 09:21

## 2019-08-14 RX ADMIN — OXYCODONE HYDROCHLORIDE AND ACETAMINOPHEN 2 TABLET: 5; 325 TABLET ORAL at 05:19

## 2019-08-14 NOTE — PLAN OF CARE
Problem: Patient Care Overview  Goal: Plan of Care Review  Outcome: Ongoing (interventions implemented as appropriate)   08/14/19 1119   Coping/Psychosocial   Plan of Care Reviewed With patient;family   OTHER   Outcome Summary pt having pain issues , limiting incr amb dist, encouragement to incr dist 5ft

## 2019-08-14 NOTE — PROGRESS NOTES
"   LOS: 2 days    Patient Care Team:  John Cowan MD as PCP - General (Internal Medicine)  John Cowan MD as PCP - Internal Medicine  Gildardo Goode MD as Consulting Physician (Urology)    Chief Complaint: Postop knee replacement  Subjective     Interval History:     Patient Complaints: Patient is postop knee replacement.  She is taking a few steps from bed to chair so far.  Up multiple times last night going to the restroom.  Pain control is excellent.  No cardiovascular complaints or GI complaints.  Probable allergic reaction last night.  Red face.  Pruritus.  Rash.  Treated with Benadryl.    Objective     Vital Signs  Temp:  [97 °F (36.1 °C)-97.9 °F (36.6 °C)] 97.9 °F (36.6 °C)  Heart Rate:  [61-80] 63  Resp:  [14-16] 16  BP: (110-153)/(64-80) 123/72    Flowsheet Rows      First Filed Value   Admission Height  162.6 cm (64\") Documented at 08/12/2019 0758   Admission Weight  115 kg (254 lb 5 oz) Documented at 08/12/2019 0758          No intake/output data recorded.  I/O last 3 completed shifts:  In: 742.3 [I.V.:742.3]  Out: 400 [Urine:350; Drains:50]    Intake/Output Summary (Last 24 hours) at 8/14/2019 0751  Last data filed at 8/13/2019 0919  Gross per 24 hour   Intake 463 ml   Output --   Net 463 ml       Physical Exam:   General Appearance:    Alert, cooperative, in no acute distress       Eyes:            Conjunctivae and sclerae normal, no   icterus, PERRLA   Neck:           Lungs:     Clear to auscultation,respirations regular, even and                  unlabored    Heart:    Regular rhythm and normal rate, normal S1 and S2, no            murmur, no gallop, no rub, no click       Abdomen:            Extremities:    Pulses:        Lymph nodes:            Results Review:    I reviewed the patient's new clinical results.  Results from last 7 days   Lab Units 08/13/19  0347 08/08/19  1443   SODIUM mmol/L 132* 142   POTASSIUM mmol/L 3.8 3.9   CHLORIDE mmol/L 97* 105   CO2 mmol/L 25.8 26.3   BUN " mg/dL 14 9   CREATININE mg/dL 0.62 0.60   CALCIUM mg/dL 8.8 9.2   BILIRUBIN mg/dL  --  0.7   ALK PHOS U/L  --  104   ALT (SGPT) U/L  --  17   AST (SGOT) U/L  --  16   GLUCOSE mg/dL 153* 111*       Estimated Creatinine Clearance: 118.5 mL/min (by C-G formula based on SCr of 0.62 mg/dL).                Results from last 7 days   Lab Units 08/13/19  0347 08/08/19  1444   WBC 10*3/mm3  --  8.12   HEMOGLOBIN g/dL 11.3* 13.1   PLATELETS 10*3/mm3  --  215       Results from last 7 days   Lab Units 08/08/19  1443   INR  1.05         Imaging Results (last 24 hours)     ** No results found for the last 24 hours. **          aspirin 325 mg Oral Daily   atorvastatin 20 mg Oral Daily   famotidine 20 mg Oral Daily   ferrous sulfate 325 mg Oral Daily With Breakfast   lisinopril 10 mg Oral QAM   sertraline 150 mg Oral Daily   sodium chloride 3 mL Intravenous Q12H       lactated ringers 9 mL/hr Last Rate: 9 mL/hr (08/12/19 0825)   lactated ringers 100 mL/hr Last Rate: Stopped (08/13/19 0919)   lactated ringers 100 mL/hr        Medication Review:   Current Facility-Administered Medications   Medication Dose Route Frequency Provider Last Rate Last Dose   • acetaminophen (TYLENOL) tablet 650 mg  650 mg Oral Q4H PRN Cam Ryan MD        Or   • acetaminophen (TYLENOL) 160 MG/5ML solution 650 mg  650 mg Oral Q4H PRN Cam Ryan MD        Or   • acetaminophen (TYLENOL) suppository 650 mg  650 mg Rectal Q4H PRN Cam Ryan MD       • acetaminophen (TYLENOL) tablet 325 mg  325 mg Oral Q4H PRN Cam Ryan MD       • aspirin tablet 325 mg  325 mg Oral Daily Cam Ryan MD   325 mg at 08/13/19 0800   • atorvastatin (LIPITOR) tablet 20 mg  20 mg Oral Daily Cam Ryan MD   20 mg at 08/13/19 0754   • bisacodyl (DULCOLAX) EC tablet 10 mg  10 mg Oral Daily PRN Cam Ryan MD       • bisacodyl (DULCOLAX) suppository 10 mg  10 mg Rectal Daily PRN Cam Ryan MD       • diphenhydrAMINE (BENADRYL)  capsule 25 mg  25 mg Oral Q6H PRN Tracey Yvette VICKY, APRN   25 mg at 08/14/19 0604   • famotidine (PEPCID) tablet 20 mg  20 mg Oral Daily Cam Ryan MD   20 mg at 08/13/19 0754   • ferrous sulfate tablet 325 mg  325 mg Oral Daily With Breakfast Cam Ryan MD   325 mg at 08/13/19 0754   • HYDROmorphone (DILAUDID) injection 1 mg  1 mg Intramuscular Q4H PRN Cam Ryan MD        And   • naloxone (NARCAN) injection 0.1 mg  0.1 mg Intravenous Q5 Min PRN Cam Ryan MD       • ibuprofen (ADVIL,MOTRIN) tablet 400 mg  400 mg Oral Q6H PRN John Cowan MD       • lactated ringers infusion  9 mL/hr Intravenous Continuous Sherin Ngo MD 9 mL/hr at 08/12/19 0825 9 mL/hr at 08/12/19 0825   • lactated ringers infusion  100 mL/hr Intravenous Continuous Cam Ryan MD   Stopped at 08/13/19 0919   • lactated ringers infusion  100 mL/hr Intravenous Continuous Chika Waters CRNA       • lisinopril (PRINIVIL,ZESTRIL) tablet 10 mg  10 mg Oral QAM Cam Ryan MD   Stopped at 08/13/19 0716   • magnesium hydroxide (MILK OF MAGNESIA) suspension 2400 mg/10mL 10 mL  10 mL Oral Daily PRN Cam Ryan MD       • oxyCODONE-acetaminophen (PERCOCET) 5-325 MG per tablet 1 tablet  1 tablet Oral Q4H PRN Cam Ryan MD   1 tablet at 08/14/19 0107   • sertraline (ZOLOFT) tablet 150 mg  150 mg Oral Daily Cam Ryan MD   150 mg at 08/13/19 0754   • sodium chloride 0.9 % flush 1-10 mL  1-10 mL Intravenous PRN Cam Ryan MD       • sodium chloride 0.9 % flush 3 mL  3 mL Intravenous Q12H Cam Ryan MD   3 mL at 08/13/19 2039       Assessment/Plan       DJD (degenerative joint disease) of knee  Hypertension  Hyperlipidemia  History of depression  Obstructive sleep apnea  GERD  History of fatty liver    So far she looks excellent postop.  Blood pressures have declined exit expected.  Back-up yesterday.  Cardizem held yesterday.  She has plans for rehabilitation at [facility]  where she rehabilitated her prior knee replacement.  She lives alone I think that is an excellent plan for her.  Rehab will be able to monitor her blood pressure and add back her hypertensive when indicated.  Looks like she will be able to resume all of her blood pressure medicine at discharge.  Allergic reaction last night.  Likely related to Percocet.  We will switch to as needed ibuprofen.    John Cowan MD  08/14/19  7:51 AM

## 2019-08-14 NOTE — PROGRESS NOTES
"/72 (BP Location: Right arm, Patient Position: Lying)   Pulse 63   Temp 97.9 °F (36.6 °C) (Oral)   Resp 16   Ht 162.6 cm (64\")   Wt 115 kg (254 lb 5 oz)   SpO2 98%   BMI 43.65 kg/m²     Results from last 7 days   Lab Units 08/13/19  0347 08/08/19  1444   WBC 10*3/mm3  --  8.12   HEMOGLOBIN g/dL 11.3* 13.1   HEMATOCRIT % 35.5 38.7   PLATELETS 10*3/mm3  --  215       Results from last 7 days   Lab Units 08/13/19  0347   SODIUM mmol/L 132*   POTASSIUM mmol/L 3.8   CHLORIDE mmol/L 97*   CO2 mmol/L 25.8   BUN mg/dL 14   CREATININE mg/dL 0.62   GLUCOSE mg/dL 153*   CALCIUM mg/dL 8.8       Imaging Results (last 24 hours)     ** No results found for the last 24 hours. **          Patient Care Team:  John Cowan MD as PCP - General (Internal Medicine)  John Cowan MD as PCP - Internal Medicine  Gildardo Goode MD as Consulting Physician (Urology)    SUBJECTIVE  Doing well.  Had reaction to Percocet  PHYSICAL EXAM  Wound looks good     DJD (degenerative joint disease) of knee      PLAN / DISPOSITION:  D/C to rehab in am  D/C percocet  Ordered Hydrocodone 7.5 mg  RAMIRO Centeno  08/14/19  8:41 AM    "

## 2019-08-14 NOTE — DISCHARGE SUMMARY
Orthopedic Discharge Summary      Patient: Ann-Marie Eaton      YOB: 1960    Medical Record Number: [id number]    Attending Physician: Cam Ryan MD  Consulting Physician(s):   Date of Admission: 8/12/2019  6:54 AM  Date of Discharge:       Patient Active Problem List   Diagnosis   • Osteoarthritis, knee   • Hypertension   • SHANNAN (obstructive sleep apnea)   • IBS (irritable bowel syndrome)   • Gastric reflux   • Fatty liver disease, nonalcoholic   • Vitamin D deficiency   • DDD (degenerative disc disease), lumbosacral   • Hyperlipidemia   • Kidney stone   • Depression   • Anxiety, generalized   • GERD (gastroesophageal reflux disease)   • Dental will-implantitis   • Leukocytosis   • DJD (degenerative joint disease) of knee     [unfilled]    Allergies:   Allergies   Allergen Reactions   • Grass Other (See Comments)     NASAL SYMPTOMS   • Other      Animal dander   • Pollen Extract    • Strawberry Extract Hives   • Sulfa Antibiotics Hives   • Tree Extract Other (See Comments)     NASAL SYMPTOMS   • Darvon [Propoxyphene] Rash       Current Medications:     Discharge Medications      Changes to Medications      Instructions Start Date   aspirin 325 MG tablet  What changed:    · medication strength  · how much to take   325 mg, Oral, Daily         Continue These Medications      Instructions Start Date   acetaminophen 500 MG tablet  Commonly known as:  TYLENOL   1,000 mg, Oral, Every 6 Hours PRN      atorvastatin 20 MG tablet  Commonly known as:  LIPITOR   20 mg, Oral, Daily      diazePAM 5 MG tablet  Commonly known as:  VALIUM   5 mg, Oral, Daily PRN      diltiaZEM  MG 24 hr capsule  Commonly known as:  CARDIZEM CD   240 mg, Oral, Every Morning      lisinopril 10 MG tablet  Commonly known as:  PRINIVIL,ZESTRIL   10 mg, Oral, Every Morning      loperamide 2 MG capsule  Commonly known as:  IMODIUM   8 mg, Oral, As Needed      NAPROSYN PO   440 mg, Oral, Daily PRN      PROBIOTIC DAILY PO   1 tablet,  Oral, Daily, Taking while on antibiotic therapy.      raNITIdine 150 MG tablet  Commonly known as:  ZANTAC   150 mg, Oral, Nightly      sertraline 100 MG tablet  Commonly known as:  ZOLOFT   150 mg, Oral, Daily      valACYclovir 500 MG tablet  Commonly known as:  VALTREX   500 mg, Oral, Daily      vitamin D 40553 units capsule capsule  Commonly known as:  ERGOCALCIFEROL   50,000 Units, Oral, Weekly, sUN       ZETIA 10 MG tablet  Generic drug:  ezetimibe   10 mg, Oral, Daily                 Past Medical History:   Diagnosis Date   • Anxiety    • DDD (degenerative disc disease), lumbar    • Eczema    • Fatty liver disease, nonalcoholic    • GERD (gastroesophageal reflux disease)    • History of concussion    • HLD (hyperlipidemia)    • HTN (hypertension)    • IBS (irritable bowel syndrome)    • SHANNAN (obstructive sleep apnea)     NO MACHINE   • Osteoarthritis of right knee    • Urinary incontinence in female     WEARS PADS   • Urolithiasis    • Vitamin D deficiency         Past Surgical History:   Procedure Laterality Date   • APPENDECTOMY     • CHOLECYSTECTOMY     • CYSTOSCOPY W/ LASER LITHOTRIPSY  2014   • EXTRACORPOREAL SHOCK WAVE LITHOTRIPSY (ESWL)      STENT  X2   • ME TOTAL KNEE ARTHROPLASTY Left 2016    Procedure: LT TOTAL KNEE ARTHROPLASTY;  Surgeon: Cam Ryan MD;  Location: [facility];  Service: Orthopedics   • TONSILLECTOMY     • TOTAL KNEE ARTHROPLASTY Right 2019    Procedure: RIGHT TOTAL KNEE ARTHROPLASTY;  Surgeon: Cam Ryan MD;  Location: [facility];  Service: Orthopedics   • URETEROTOMY  2014    R Stent- stone        Social History     Occupational History   • Not on file   Tobacco Use   • Smoking status: Former Smoker     Packs/day: 1.00     Years: 15.00     Pack years: 15.00     Types: Cigarettes     Last attempt to quit: 2006     Years since quittin.6   • Smokeless tobacco: Never Used   Substance and Sexual Activity   • Alcohol use: Yes     Frequency: Monthly  or less     Drinks per session: 1 or 2     Binge frequency: Never     Comment: 1 MONTHLY   • Drug use: No   • Sexual activity: Not on file      Social History     Social History Narrative   • Not on file        Family History   Problem Relation Age of Onset   • Lung cancer Mother    • Aneurysm Father    • COPD Father    • No Known Problems Brother    • No Known Problems Daughter    • No Known Problems Sister    • Malig Hyperthermia Neg Hx          Physical Exam: 59 y.o. female  General Appearance:    Alert, cooperative, in no acute distress                      Vitals:    08/13/19 2257 08/14/19 0357 08/14/19 0700 08/14/19 0710   BP: 153/80 132/72 123/72    BP Location: Right arm Right arm Right arm    Patient Position: Lying Lying Lying    Pulse: 65 80 63    Resp: 16 14 16    Temp: 97 °F (36.1 °C) 97.4 °F (36.3 °C) 97.9 °F (36.6 °C)    TempSrc: Oral Oral Oral    SpO2: 97% 90% (!) 78% 98%   Weight:       Height:            Head:    Normocephalic, without obvious abnormality, atraumatic   Eyes:            Lids and lashes normal, conjunctivae and sclerae normal, no   icterus, no pallor, corneas clear, PERRLA   Ears:    Ears appear intact with no abnormalities noted   Throat:   No oral lesions, no thrush, oral mucosa moist   Neck:   No adenopathy, supple, trachea midline, no thyromegaly, no    carotid bruit, no JVD   Back:     No kyphosis present, no scoliosis present, no skin lesions,       erythema or scars, no tenderness to percussion or                   palpation,   range of motion normal   Lungs:     Clear to auscultation,respirations regular, even and                   unlabored    Heart:    Regular rhythm and normal rate, normal S1 and S2, no            murmur, no gallop, no rub, no click   Chest Wall:    No abnormalities observed   Abdomen:     Normal bowel sounds, no masses, no organomegaly, soft        non-tender, non-distended, no guarding, no rebound                 tenderness   Rectal:     Deferred    Extremities:   Incision intact without signs or symptoms of infection.               Neurovascular status remains intact to operative extremity.      Moves all extremities well, no edema, no cyanosis, no              redness   Pulses:   Pulses palpable and equal bilaterally   Skin:   No bleeding, bruising or rash   Lymph nodes:   No palpable adenopathy   Neurologic:   Cranial nerves 2 - 12 grossly intact, sensation intact, DTR        present and equal bilaterally           Hospital Course:  59 y.o. female admitted to [facility] to services of Cam Ryan MD with DJD (degenerative joint disease) of knee [M17.10] on 8/12/2019 and underwent [unfilled]  Per [unfilled]. Antibiotic and VTE prophylaxis were per SCIP protocols. Post-operatively the patient transferred to the post-operative floor where the patient underwent mobilization therapy that included active as well as passive ROM exercises. Opioids were titrated to achieve appropriate pain management to allow for participation in mobilization exercises. Vital signs are now stable. The incision is intact without signs or symptoms of infection. Operative extremity neurovascular status remains intact.   Appropriate education re: incision care, activity levels, medications, and follow up visits was completed and all questions were answered. The patient is now deemed stable for discharge to Home.      DIAGNOSTIC TESTS:     Admission on 08/12/2019   Component Date Value Ref Range Status   • Glucose 08/13/2019 153* 65 - 99 mg/dL Final   • BUN 08/13/2019 14  8 - 23 mg/dL Final   • Creatinine 08/13/2019 0.62  0.57 - 1.00 mg/dL Final   • Sodium 08/13/2019 132* 136 - 145 mmol/L Final   • Potassium 08/13/2019 3.8  3.5 - 5.2 mmol/L Final   • Chloride 08/13/2019 97* 98 - 107 mmol/L Final   • CO2 08/13/2019 25.8  22.0 - 29.0 mmol/L Final   • Calcium 08/13/2019 8.8  8.6 - 10.5 mg/dL Final   • eGFR Non African Amer 08/13/2019 98  >60 mL/min/1.73 Final   • BUN/Creatinine  Ratio 08/13/2019 22.6  7.0 - 25.0 Final   • Anion Gap 08/13/2019 9.2  5.0 - 15.0 mmol/L Final   • Hemoglobin 08/13/2019 11.3* 12.0 - 15.9 g/dL Final   • Hematocrit 08/13/2019 35.5  34.0 - 46.6 % Final       No results found.    Discharge and Follow up Instructions:   D/C to rehab on 8/15   mg once daily x's 6 weeks  Hydrocodone 7.5 mg for pain    Date: 8/14/2019    RAMIRO Centeno

## 2019-08-14 NOTE — PLAN OF CARE
Problem: Patient Care Overview  Goal: Plan of Care Review  Outcome: Ongoing (interventions implemented as appropriate)   08/14/19 0698   Coping/Psychosocial   Plan of Care Reviewed With patient   OTHER   Outcome Summary Pt VSS, afebrile, requiring O2 nc while sleeping to maintain sats >90%, pt states she has SHANNAN but doesn't wear a cpap at home, pain controllled wtih po pain medication.Anticipate DC tomorrow.       Problem: Fall Risk (Adult)  Goal: Absence of Fall  Outcome: Ongoing (interventions implemented as appropriate)

## 2019-08-14 NOTE — PROGRESS NOTES
Continued Stay Note  [facility]     Patient Name: Ann-Marie Eaton  MRN: [id number]  Today's Date: 8/14/2019    Admit Date: 8/12/2019    Discharge Plan     Row Name 08/14/19 1528       Plan    Plan  Yuliya precert obtained for [place]    Patient/Family in Agreement with Plan  yes    Plan Comments  Per Doreen mccormick obtained for [place]...MARISSA Salcedo        Discharge Codes    No documentation.       Expected Discharge Date and Time     Expected Discharge Date Expected Discharge Time    Aug 15, 2019             Elham Murdock

## 2019-08-14 NOTE — THERAPY TREATMENT NOTE
Acute Care - Physical Therapy Treatment Note  [facility]     Patient Name: Ann-Marie Eaton  : 1960  MRN: [id number]  Today's Date: 2019             Admit Date: 2019    Visit Dx:  No diagnosis found.  Patient Active Problem List   Diagnosis   • Osteoarthritis, knee   • Hypertension   • SHANNAN (obstructive sleep apnea)   • IBS (irritable bowel syndrome)   • Gastric reflux   • Fatty liver disease, nonalcoholic   • Vitamin D deficiency   • DDD (degenerative disc disease), lumbosacral   • Hyperlipidemia   • Kidney stone   • Depression   • Anxiety, generalized   • GERD (gastroesophageal reflux disease)   • Dental will-implantitis   • Leukocytosis   • DJD (degenerative joint disease) of knee       Therapy Treatment    Rehabilitation Treatment Summary     Row Name 19 1059             Treatment Time/Intention    Discipline  physical therapy assistant  -      Document Type  therapy note (daily note)  -      Subjective Information  complains of;weakness;fatigue;pain;numbness;swelling  -      Mode of Treatment  group therapy;physical therapy  -      Care Plan Review  patient/other agree to care plan  -      Care Plan Review, Other Participant(s)  family  -      Existing Precautions/Restrictions  fall  -      Recorded by [] Indira Benitez [facility] 19 1119      Row Name 19 1059             Sit-Stand Transfer    Sit-Stand [place] (Transfers)  minimum assist (75% patient effort);verbal cues  -      Assistive Device (Sit-Stand Transfers)  walker, front-wheeled  -      Recorded by [] Indira Benitez PTA 19 1119      Row Name 19 1059             Gait/Stairs Assessment/Training    [place] Level (Gait)  contact guard  -      Assistive Device (Gait)  walker, front-wheeled  -      Distance in Feet (Gait)  45  -      Pattern (Gait)  step-to  -      Deviations/Abnormal Patterns (Gait)  antalgic;base of support, wide;tiffanie decreased;stride length decreased   -JM      Bilateral Gait Deviations  forward flexed posture  -JM      Comment (Gait/Stairs)  needs encouragement to incr dist  -JM      Recorded by [JM] Indira Benitez PTA 08/14/19 1119      Row Name 08/14/19 1059             General ROM    GENERAL ROM COMMENTS  0-75  -JM      Recorded by [JM] Indira Benitez PTA 08/14/19 1119      Row Name 08/14/19 1059             Therapeutic Exercise    Comment (Therapeutic Exercise)  TKR protocol x 20-25 reps, pain limiting  -JM      Recorded by [JM] Indira Benitez PTA 08/14/19 1119      Row Name 08/14/19 1059             Positioning and Restraints    Pre-Treatment Position  sitting in chair/recliner  -JM      In Chair  reclined;call light within reach;encouraged to call for assist;with family/caregiver  -JM      Recorded by [JM] Indira Benitez PTA 08/14/19 1119      Row Name 08/14/19 1059             Pain Scale: Numbers Pre/Post-Treatment    Pain Scale: Numbers, Pretreatment  8/10  -      Pain Scale: Numbers, Post-Treatment  9/10  -JM      Pain Location - Side  Right  -JM      Pain Location  knee  -JM      Pain Intervention(s)  Medication (See MAR);Repositioned just had meds prior to gym session  -JM      Recorded by [JM] Indira Benitez PTA 08/14/19 1119      Row Name                Wound 08/12/19 1050 Right knee Incision    Wound - Properties Group Date first assessed: 08/12/19 [MB] Time first assessed: 1050 [MB] Side: Right [MB] Location: knee [MB] Primary Wound Type: Incision [MB] Recorded by:  [MB] Sofie Rodriguez RN 08/12/19 1050      User Key  (r) = Recorded By, (t) = Taken By, (c) = Cosigned By    Initials Name Effective Dates Discipline    Sofie Spencer RN 06/16/16 -  Nurse    Indira Wilson PTA 03/07/18 -  PT          Wound 08/12/19 1050 Right knee Incision (Active)   Dressing Appearance dry;intact;no drainage 8/14/2019  4:00 AM   Closure RODY 8/14/2019  4:00 AM   Base dressing in place, unable to visualize 8/14/2019  4:00 AM   Drainage  Amount none 8/14/2019  4:00 AM   Dressing Care, Wound dressing changed 8/13/2019  4:00 PM           Physical Therapy Education     Title: PT OT SLP Therapies (Done)     Topic: Physical Therapy (Done)     Point: Mobility training (Done)     Learning Progress Summary           Patient Acceptance, E,TB,D, VU,NR by ALVARO at 8/14/2019 11:22 AM    Acceptance, E,TB,D, VU,NR by ALVARO at 8/13/2019  6:57 PM    Acceptance, E,D, NR,VU by MARJAN at 8/12/2019  4:16 PM   Family Acceptance, E,TB,D, VU,NR by ALVARO at 8/14/2019 11:22 AM    Acceptance, E,TB,D, VU,NR by ALVARO at 8/13/2019  6:57 PM                   Point: Home exercise program (Done)     Learning Progress Summary           Patient Acceptance, E,TB,D, VU,NR by ALVARO at 8/14/2019 11:22 AM    Acceptance, E,TB,D, VU,NR by ALVARO at 8/13/2019  6:57 PM    Acceptance, E,D, NR,VU by MARJAN at 8/12/2019  4:16 PM   Family Acceptance, E,TB,D, VU,NR by ALVARO at 8/14/2019 11:22 AM    Acceptance, E,TB,D, VU,NR by ALVARO at 8/13/2019  6:57 PM                   Point: Body mechanics (Done)     Learning Progress Summary           Patient Acceptance, E,TB,D, VU,NR by ALVARO at 8/14/2019 11:22 AM    Acceptance, E,TB,D, VU,NR by ALVARO at 8/13/2019  6:57 PM    Acceptance, E,D, NR,VU by MARJAN at 8/12/2019  4:16 PM   Family Acceptance, E,TB,D, VU,NR by ALVARO at 8/14/2019 11:22 AM    Acceptance, E,TB,D, VU,NR by ALVARO at 8/13/2019  6:57 PM                   Point: Precautions (Done)     Learning Progress Summary           Patient Acceptance, E,TB,D, VU,NR by ALVARO at 8/14/2019 11:22 AM    Acceptance, E,TB,D, VU,NR by ALVARO at 8/13/2019  6:57 PM    Acceptance, E,D, NR,VU by MARJAN at 8/12/2019  4:16 PM   Family Acceptance, E,TB,D, VU,NR by ALVARO at 8/14/2019 11:22 AM    Acceptance, SELAM MCKINNEY D, PAUL,NR by ALVARO at 8/13/2019  6:57 PM                               User Key     Initials Effective Dates Name Provider Type Discipline     04/03/18 -  Amanda Valencia, PT Physical Therapist PT    ALVARO 03/07/18 -  Indira Benitez, SHAJI Physical Therapy Assistant PT                 PT Recommendation and Plan     Plan of Care Reviewed With: patient, family  Outcome Summary: pt having pain issues , limiting incr amb dist, encouragement to incr dist 5ft  Outcome Measures     Row Name 08/14/19 1100 08/13/19 1800          How much help from another person do you currently need...    Turning from your back to your side while in flat bed without using bedrails?  3  -JM  3  -JM     Moving from lying on back to sitting on the side of a flat bed without bedrails?  3  -JM  3  -JM     Moving to and from a bed to a chair (including a wheelchair)?  3  -JM  3  -JM     Standing up from a chair using your arms (e.g., wheelchair, bedside chair)?  3  -JM  3  -JM     Climbing 3-5 steps with a railing?  1  -JM  1  -JM     To walk in hospital room?  3  -JM  3  -JM     AM-PAC 6 Clicks Score (PT)  16  -JM  16  -JM       User Key  (r) = Recorded By, (t) = Taken By, (c) = Cosigned By    Initials Name Provider Type    Indira Wilson PTA Physical Therapy Assistant         Time Calculation:   PT Charges     Row Name 08/14/19 1123             Time Calculation    Start Time  1030  -      Stop Time  1115  -      Time Calculation (min)  45 min  -ALVARO      PT Received On  08/14/19  -ALVARO      PT - Next Appointment  08/14/19  -ALVARO         Time Calculation- PT    Total Timed Code Minutes- PT  20 minute(s)  -        User Key  (r) = Recorded By, (t) = Taken By, (c) = Cosigned By    Initials Name Provider Type    Indira Wilson PTA Physical Therapy Assistant        Therapy Charges for Today     Code Description Service Date Service Provider Modifiers Qty    89640583341 HC PT THER PROC EA 15 MIN 8/13/2019 Indira Benitez PTA GP 2    92795570742 HC PT THER PROC GROUP 8/13/2019 Indira Benitez PTA GP 1    93645001763 HC PT THER PROC EA 15 MIN 8/13/2019 Indira Benitez PTA GP 1    70281945247 HC PT THER PROC GROUP 8/13/2019 Indira Benitez PTA GP 1    49008889707 HC PT THER PROC EA 15 MIN 8/14/2019 Emmanuel  SHAJI Jacobson GP 1    [id number]  PT THER PROC GROUP 8/14/2019 Indira Benitez PTA GP 1          PT G-Codes  Outcome Measure Options: AM-PAC 6 Clicks Basic Mobility (PT)  AM-PAC 6 Clicks Score (PT): 16    Indira Benitez PTA  8/14/2019

## 2019-08-14 NOTE — THERAPY TREATMENT NOTE
Acute Care - Physical Therapy Treatment Note  [facility]     Patient Name: Ann-Marie Eaton  : 1960  MRN: [id number]  Today's Date: 2019             Admit Date: 2019    Visit Dx:  No diagnosis found.  Patient Active Problem List   Diagnosis   • Osteoarthritis, knee   • Hypertension   • SHANNAN (obstructive sleep apnea)   • IBS (irritable bowel syndrome)   • Gastric reflux   • Fatty liver disease, nonalcoholic   • Vitamin D deficiency   • DDD (degenerative disc disease), lumbosacral   • Hyperlipidemia   • Kidney stone   • Depression   • Anxiety, generalized   • GERD (gastroesophageal reflux disease)   • Dental will-implantitis   • Leukocytosis   • DJD (degenerative joint disease) of knee       Therapy Treatment    Rehabilitation Treatment Summary     Row Name 197 19 105          Treatment Time/Intention    Discipline  physical therapy assistant  -  physical therapy assistant  -     Document Type  therapy note (daily note)  -  therapy note (daily note)  -ALVARO     Subjective Information  complains of;pain  -  complains of;weakness;fatigue;pain;numbness;swelling  -     Mode of Treatment  group therapy;physical therapy  -  group therapy;physical therapy  -     Care Plan Review  patient/other agree to care plan  -  patient/other agree to care plan  -     Care Plan Review, Other Participant(s)  --  family  -     Existing Precautions/Restrictions  fall  -  fall  -     Recorded by [ALVARO] Indira Benitez PTA 19 [JM] Indira Benitez PTA 19 111     Row Name 197 19 105          Sit-Stand Transfer    Sit-Stand [place] (Transfers)  minimum assist (75% patient effort);verbal cues  -  minimum assist (75% patient effort);verbal cues  -     Assistive Device (Sit-Stand Transfers)  walker, front-wheeled  -  walker, front-wheeled  -     Recorded by [ALVARO] Indira Benitez PTA 19 [ALVARO] Indira Benitez PTA 19 111     Row  Name 08/14/19 1617 08/14/19 1059          Gait/Stairs Assessment/Training    [place] Level (Gait)  contact guard  -JM  contact guard  -JM     Assistive Device (Gait)  walker, front-wheeled  -JM  walker, front-wheeled  -JM     Distance in Feet (Gait)  45  -JM  45  -JM     Pattern (Gait)  step-to  -JM  step-to  -JM     Deviations/Abnormal Patterns (Gait)  antalgic;base of support, wide;tiffanie decreased;stride length decreased  -JM  antalgic;base of support, wide;tiffanie decreased;stride length decreased  -JM     Bilateral Gait Deviations  forward flexed posture  -JM  forward flexed posture  -JM     Comment (Gait/Stairs)  pain limiting dist  -JM  needs encouragement to incr dist  -JM     Recorded by [] Indira Benitez, [facility] 08/14/19 1619 [] Indira Benitez, [facility] [place] 08/14/19 1119     Row Name 08/14/19 1059             General ROM    GENERAL ROM COMMENTS  0-75  -JM      Recorded by [JM] Indira Benitez, [facility] [place] 08/14/19 1119      Row Name 08/14/19 1617 08/14/19 1059          Therapeutic Exercise    Comment (Therapeutic Exercise)  TKR protocol x 25 reps  -JM  TKR protocol x 20-25 reps, pain limiting  -JM     Recorded by [] Indira Benitez, [facility] [place] 08/14/19 1619 [] Indira Benitez, [facility] [place] 08/14/19 1119     Row Name 08/14/19 1059             Positioning and Restraints    Pre-Treatment Position  sitting in chair/recliner  -JM      In Chair  reclined;call light within reach;encouraged to call for assist;with family/caregiver  -JM      Recorded by [JM] Indira Benitez, [facility] [place] [facility] 08/14/19 1119      Row Name 08/14/19 1617 08/14/19 1059          Pain Scale: Numbers Pre/Post-Treatment    Pain Scale: Numbers, Pretreatment  7/10  -JM  8/10  -JM     Pain Scale: Numbers, Post-Treatment  --  9/10  -JM     Pain Location - Side  Right  -JM  Right  -JM     Pain Location  knee  -JM  knee  -JM     Pain Intervention(s)  Medication (See MAR);Repositioned nsg to apply cold pack after BR  -JM  Medication (See MAR);Repositioned just had meds prior  to gym session  -ALVARO     Recorded by [ALVARO] Indira Benitez PTA 08/14/19 1619 [JM] Indira Benitez PTA 08/14/19 1119     Row Name                Wound 08/12/19 1050 Right knee Incision    Wound - Properties Group Date first assessed: 08/12/19 [MB] Time first assessed: 1050 [MB] Side: Right [MB] Location: knee [MB] Primary Wound Type: Incision [MB] Recorded by:  [MB] Sofie Rodriguez RN 08/12/19 1050      User Key  (r) = Recorded By, (t) = Taken By, (c) = Cosigned By    Initials Name Effective Dates Discipline    Sofie Spencer RN 06/16/16 -  Nurse    Indira Wilson PTA 03/07/18 -  PT          Wound 08/12/19 1050 Right knee Incision (Active)   Dressing Appearance dry;intact;no drainage 8/14/2019 12:00 PM   Closure RODY 8/14/2019 12:00 PM   Base dressing in place, unable to visualize 8/14/2019 12:00 PM   Drainage Amount none 8/14/2019 12:00 PM           Physical Therapy Education     Title: PT OT SLP Therapies (Done)     Topic: Physical Therapy (Done)     Point: Mobility training (Done)     Learning Progress Summary           Patient Acceptance, E,TB,D, VU,NR by ALVARO at 8/14/2019 11:22 AM    Acceptance, E,TB,D, VU,NR by ALVARO at 8/13/2019  6:57 PM    Acceptance, E,D, NR,VU by MARJAN at 8/12/2019  4:16 PM   Family Acceptance, E,TB,D, VU,NR by ALVARO at 8/14/2019 11:22 AM    Acceptance, E,TB,D, VU,NR by ALVARO at 8/13/2019  6:57 PM                   Point: Home exercise program (Done)     Learning Progress Summary           Patient Acceptance, E,TB,D, VU,NR by ALVARO at 8/14/2019 11:22 AM    Acceptance, E,TB,D, VU,NR by ALVARO at 8/13/2019  6:57 PM    Acceptance, E,D, NR,VU by MARJAN at 8/12/2019  4:16 PM   Family Acceptance, E,TB,D, VU,NR by ALVARO at 8/14/2019 11:22 AM    Acceptance, E,TB,D, VU,NR by ALVARO at 8/13/2019  6:57 PM                   Point: Body mechanics (Done)     Learning Progress Summary           Patient Acceptance, SELAM MCKINNEY D, VU, NR by ALVARO at 8/14/2019 11:22 AM    Acceptance, SELAM MCKINNEY D, VU, NR by ALVARO at 8/13/2019  6:57 PM     Acceptance, E,D, NR,VU by  at 8/12/2019  4:16 PM   Family Acceptance, E,TB,D, VU,NR by ALVARO at 8/14/2019 11:22 AM    Acceptance, E,TB,D, VU,NR by ALVARO at 8/13/2019  6:57 PM                   Point: Precautions (Done)     Learning Progress Summary           Patient Acceptance, E,TB,D, VU,NR by ALVARO at 8/14/2019 11:22 AM    Acceptance, E,TB,D, VU,NR by ALVARO at 8/13/2019  6:57 PM    Acceptance, E,D, NR,VU by  at 8/12/2019  4:16 PM   Family Acceptance, E,TB,D, VU,NR by ALVARO at 8/14/2019 11:22 AM    Acceptance, E,TB,D, VU,NR by ALVARO at 8/13/2019  6:57 PM                               User Key     Initials Effective Dates Name Provider Type Discipline     04/03/18 -  Amanda Valencia, PT Physical Therapist PT    ALVARO 03/07/18 -  Indira Benitez PTA Physical Therapy Assistant PT                PT Recommendation and Plan     Plan of Care Reviewed With: patient, family  Outcome Summary: pt having pain issues , limiting incr amb dist, encouragement to incr dist 5ft  Outcome Measures     Row Name 08/14/19 1100 08/13/19 1800          How much help from another person do you currently need...    Turning from your back to your side while in flat bed without using bedrails?  3  -JM  3  -JM     Moving from lying on back to sitting on the side of a flat bed without bedrails?  3  -JM  3  -JM     Moving to and from a bed to a chair (including a wheelchair)?  3  -JM  3  -JM     Standing up from a chair using your arms (e.g., wheelchair, bedside chair)?  3  -JM  3  -JM     Climbing 3-5 steps with a railing?  1  -  1  -JM     To walk in hospital room?  3  -JM  3  -JM     AM-PAC 6 Clicks Score (PT)  16  -  16  -       User Key  (r) = Recorded By, (t) = Taken By, (c) = Cosigned By    Initials Name Provider Type    Indira Wilson PTA Physical Therapy Assistant         Time Calculation:   PT Charges     Row Name 08/14/19 1619 08/14/19 1123          Time Calculation    Start Time  1505  -JM  1030  -JM     Stop Time  1600  -JM  1115  -JM      Time Calculation (min)  55 min  -ALVARO  45 min  -ALVARO     PT Received On  08/14/19  -ALVARO  08/14/19  -ALVARO     PT - Next Appointment  08/15/19  -ALVARO  08/14/19  -ALVARO        Time Calculation- PT    Total Timed Code Minutes- PT  20 minute(s)  -JM  20 minute(s)  -ALVARO       User Key  (r) = Recorded By, (t) = Taken By, (c) = Cosigned By    Initials Name Provider Type    Indira Wilson PTA Physical Therapy Assistant        Therapy Charges for Today     Code Description Service Date Service Provider Modifiers Qty    21758078217 HC PT THER PROC EA 15 MIN 8/13/2019 Indira Benitez, PTA GP 2    85575687239 HC PT THER PROC GROUP 8/13/2019 Indira Benitez, PTA GP 1    71824098870 HC PT THER PROC EA 15 MIN 8/13/2019 Indira Benitez, PTA GP 1    57976524918 HC PT THER PROC GROUP 8/13/2019 Indira Benitez, PTA GP 1    89243691979 HC PT THER PROC EA 15 MIN 8/14/2019 Indira Benitez, PTA GP 1    83055249546 HC PT THER PROC GROUP 8/14/2019 Indira Benitez, PTA GP 1    56985905083 HC PT THER PROC EA 15 MIN 8/14/2019 Indira Benitez, PTA GP 1    14057749532 HC PT THER PROC GROUP 8/14/2019 Indira Benitez, PTA GP 1          PT G-Codes  Outcome Measure Options: AM-PAC 6 Clicks Basic Mobility (PT)  AM-PAC 6 Clicks Score (PT): 16    Indira Benitez PTA  8/14/2019

## 2019-08-15 VITALS
SYSTOLIC BLOOD PRESSURE: 125 MMHG | RESPIRATION RATE: 16 BRPM | OXYGEN SATURATION: 96 % | HEART RATE: 74 BPM | DIASTOLIC BLOOD PRESSURE: 69 MMHG | WEIGHT: 254.31 LBS | HEIGHT: 64 IN | BODY MASS INDEX: 43.42 KG/M2 | TEMPERATURE: 98.1 F

## 2019-08-15 PROCEDURE — 97150 GROUP THERAPEUTIC PROCEDURES: CPT

## 2019-08-15 PROCEDURE — 97110 THERAPEUTIC EXERCISES: CPT

## 2019-08-15 RX ADMIN — HYDROCODONE BITARTRATE AND ACETAMINOPHEN 1 TABLET: 7.5; 325 TABLET ORAL at 04:42

## 2019-08-15 RX ADMIN — HYDROCODONE BITARTRATE AND ACETAMINOPHEN 1 TABLET: 7.5; 325 TABLET ORAL at 12:24

## 2019-08-15 RX ADMIN — FAMOTIDINE 20 MG: 20 TABLET, FILM COATED ORAL at 09:01

## 2019-08-15 RX ADMIN — SERTRALINE 150 MG: 100 TABLET, FILM COATED ORAL at 09:01

## 2019-08-15 RX ADMIN — FERROUS SULFATE TAB 325 MG (65 MG ELEMENTAL FE) 325 MG: 325 (65 FE) TAB at 09:01

## 2019-08-15 RX ADMIN — HYDROCODONE BITARTRATE AND ACETAMINOPHEN 1 TABLET: 7.5; 325 TABLET ORAL at 09:02

## 2019-08-15 RX ADMIN — LISINOPRIL 10 MG: 10 TABLET ORAL at 09:02

## 2019-08-15 RX ADMIN — ATORVASTATIN CALCIUM 20 MG: 20 TABLET, FILM COATED ORAL at 09:01

## 2019-08-15 RX ADMIN — ASPIRIN 325 MG: 325 TABLET ORAL at 09:01

## 2019-08-15 NOTE — DISCHARGE INSTR - ACTIVITY
General Information: The length of hospital stay after knee and hip replacement surgery has, over the last several years,  decreased significantly. Reasons for this include concerns regarding costs and attempts to keep those costs down. However, as surgeons have improved their surgical techniques, as those techniques have become less invasive, and as pain management has improved with the Adventism of longer acting blocks that do not interfere with early Physical Therapy, safe early hospital discharge has become common place.    Discharge Home vs Rehab: Though it may seem intuitive that being discharged from the hospital to a rehab facility would be advantageous due to the seeming greater availability of physical therapy, the reverse is actually true. When patients return to the office at the 3-week post op kaitlynn, those patients who have been discharged to their home environment consistently out preform those patients who spent time in an inpatient rehab facility. There are several reasons for this. First, home health nursing and physical therapy services have over the years become cutting edge. Second, when placed in the home environment, patients inevitably will do more for themselves in terms of self-care and mobilization. The ‘getting up and doing for oneself’ is in and of itself, physical therapy. Even the task of managing stairs at home is a form of therapy. Those patients who go home after a brief hospital stay typically are walking better, have more of a spring in their step, are functioning more independently, and are closer to getting back to a normal lifestyle than those who have spent time in an inpatient rehab facility. In addition, my mantra is this: if you don’t want to get sick, stay away from places where sick people are. Going home is the safest and best option after hospital discharge for any patient who is not totally alone and can arrange for any kind of help at home (this is another reason, by  the way ,to try to get out of the hospital sooner rather than later).    Specific Post Op Instructions:  Blood Thinners: All patients will be on some type of blood thinner post op to prevent blood clots. Often a blood thinning shot is used while in the hospital. Most patients who are not high risk are discharged on aspirin (either 325 mg or 81 mg depending on age and size). High risk patients may be discharged on a more potent oral or injectable form of a blood thinner. Keep in mind that the more aggressive the blood thinner used, the greater the risk of bleeding complications post op. This is always a balancing act.  Ice: Early post op until the swelling diminishes, ice should be applied to the knee for 30 minutes out of every 2 hours while awake.  Staples: Staples are taken out at 14 days post op. Usually the nurse at home will perform this task.  Dressing Changes: If the wound is clean and dry with no redness or drainage, the dressings can be discontinued on the 3rd or 4th day post op.  Showering: The edges of the wound take 3 days after surgery to seal. Given the magnitude of hip and knee replacement surgery, I always like to build in a safety margin in  terms of getting the wound wet. Thus waiting till the 5th -7th day post op is recommended before showering. Do not soak the wound in water till the staples are out and the staple puncture wounds have healed. If there is any redness or drainage, the wound should be kept dry and covered by a sterile dressing until this resolves.  Weight Bearing/Ambulatory Aids: Most hip and knee replacments are implanted such that full immediate weight bearing is allowed. The walker and cane can be discarded when tolerated. There are a few special circumstances where 6 weeks of protected weight bearing may be required.  Follow-up Appointment: The first follow-up office appointment is at the 3-week kaitlynn post op. You need to call to set up this appointment. We want to see you back  sooner if there are any problems.  Physical Therapy: Generally about 6 weeks of therapy is required after hip and knee replacement surgery. Hips usually require less PT than do knees. Usually the first 3 weeks or so of PT is done at home. Knees especially often need to follow that up with 3 weeks of outpatient therapy.  Driving a Car: With left leg surgery, driving is permitted when the patient is off of pain medication and feels sufficiently alert and strong enough to physically handle a car safely. Due to liability issues, it is generally recommended to not drive until 6 weeks post op after right leg surgery.  Returning to Daily and Recreational Activities: For the most part, patients can progress to daily activities as tolerated. Initially, it is best not to “overdo it” in an attempt to minimize early post op swelling. Once the swelling is controlled, the patient may progress as tolerated. It usually is 6 weeks before patients feel up to most all daily activities, and 3 months before feeling up to more vigorous physical activities. A golfer might start back playing at about 6 weeks. A  would probably not feel up to resuming playing until 3 months.  Return to Work: My basic premise is this: I am not the work police. I try to implant both knee and hip replacements such that it is safe to perform any activity that the patient can tolerate and then let the patient decide. The average time until returning to a sedentary job is 6 weeks. The average time until returning to a physical job is 3 months. There is great variability. The return to work date depends on many factors. It often depends in part on the patient’s perceived need to work, flexibility of demands in the work place environment, and other social and physical factors. Suffice to say that we will provide you with a note to return to work whenever you think you can manage whatever it is you will face on returning to work.

## 2019-08-15 NOTE — PLAN OF CARE
Problem: Patient Care Overview  Goal: Plan of Care Review  Outcome: Ongoing (interventions implemented as appropriate)   08/15/19 0546   OTHER   Outcome Summary VSS, needing 02 2 liters at night, Hx of SHANNAN but doesn't wear cpap. pain meds switched from percocet to hydrocodone due to reddened rash. anticipating discharge to rehab today.      Goal: Individualization and Mutuality  Outcome: Ongoing (interventions implemented as appropriate)    Goal: Discharge Needs Assessment  Outcome: Ongoing (interventions implemented as appropriate)    Goal: Interprofessional Rounds/Family Conf  Outcome: Ongoing (interventions implemented as appropriate)      Problem: Fall Risk (Adult)  Goal: Absence of Fall  Outcome: Ongoing (interventions implemented as appropriate)      Problem: Knee Arthroplasty (Total, Partial) (Adult)  Goal: Signs and Symptoms of Listed Potential Problems Will be Absent, Minimized or Managed (Knee Arthroplasty)  Outcome: Ongoing (interventions implemented as appropriate)

## 2019-08-15 NOTE — PROGRESS NOTES
"  Orthopaedic Surgery   Daily Progress Note  Dr. Cam Ryan Sr  [phone]  DEMOGRAPHICS:   · Ann-Marie Eaton   · Age:59 y.o.   · MRN:[id number]  · Admitted: 8/12/2019    PROCEDURE: 3 Days Post-Op s/p Procedure(s):  RIGHT TOTAL KNEE ARTHROPLASTY     /71 (BP Location: Left arm, Patient Position: Lying)   Pulse 70   Temp 97.2 °F (36.2 °C) (Oral)   Resp 18   Ht 162.6 cm (64\")   Wt 115 kg (254 lb 5 oz)   SpO2 98%   BMI 43.65 kg/m²     Lab Results (last 24 hours)     ** No results found for the last 24 hours. **          Imaging Results (last 24 hours)     ** No results found for the last 24 hours. **          Patient Care Team:  John Cowan MD as PCP - General (Internal Medicine)  John Cowan MD as PCP - Internal Medicine  Gildardo Goode MD as Consulting Physician (Urology)    SUBJECTIVE  More comfortable    PHYSICAL EXAM  Wound looks good     ASSESSMENT: Patient is a 59 y.o. female who is 3 Days Post-Op s/p Procedure(s):  RIGHT TOTAL KNEE ARTHROPLASTY     PLAN / DISPOSITION:  Discharge to rehab today.    Cam Ryan Sr, MD  Orthopaedic Surgery  [facility]  [phone]        "

## 2019-08-15 NOTE — THERAPY TREATMENT NOTE
Acute Care - Physical Therapy Treatment Note  [facility]     Patient Name: Ann-Marie Eaton  : 1960  MRN: [id number]  Today's Date: 8/15/2019             Admit Date: 2019    Visit Dx:  No diagnosis found.  Patient Active Problem List   Diagnosis   • Osteoarthritis, knee   • Hypertension   • SHANNAN (obstructive sleep apnea)   • IBS (irritable bowel syndrome)   • Gastric reflux   • Fatty liver disease, nonalcoholic   • Vitamin D deficiency   • DDD (degenerative disc disease), lumbosacral   • Hyperlipidemia   • Kidney stone   • Depression   • Anxiety, generalized   • GERD (gastroesophageal reflux disease)   • Dental will-implantitis   • Leukocytosis   • DJD (degenerative joint disease) of knee       Therapy Treatment    Rehabilitation Treatment Summary     Row Name 08/15/19 1030             Treatment Time/Intention    Discipline  physical therapy assistant  -      Document Type  therapy note (daily note);discharge treatment  -      Subjective Information  complains of;fatigue;pain  -      Mode of Treatment  group therapy;physical therapy  -      Care Plan Review  patient/other agree to care plan  -      Care Plan Review, Other Participant(s)  friend  -      Existing Precautions/Restrictions  fall  -      Recorded by [] Indira Benitez [facility] 08/15/19 1705      Row Name 08/15/19 1030             Sit-Stand Transfer    Sit-Stand Page (Transfers)  minimum assist (75% patient effort);contact guard;verbal cues  -      Assistive Device (Sit-Stand Transfers)  walker, front-wheeled  -      Recorded by [] Indira Benitez PTA 08/15/19 1705      Row Name 08/15/19 1030             Gait/Stairs Assessment/Training    Page Level (Gait)  contact guard  -      Assistive Device (Gait)  walker, front-wheeled  -      Distance in Feet (Gait)  60  -JM      Pattern (Gait)  step-to  -      Deviations/Abnormal Patterns (Gait)  antalgic;tiffanie decreased;stride length decreased  -       Bilateral Gait Deviations  forward flexed posture  -JM      Comment (Gait/Stairs)  improved DASH  -JM      Recorded by [JM] Indira Benitez PTA 08/15/19 1705      Row Name 08/15/19 1030             General ROM    GENERAL ROM COMMENTS  -4-70  -JM      Recorded by [ALVARO] Indira Benitez PTA 08/15/19 1705      Row Name 08/15/19 1030             Therapeutic Exercise    Comment (Therapeutic Exercise)  TKR protocol x 30 reps , assist to initiate SLRs  -JM      Recorded by [JM] Indira Benitez PTA 08/15/19 1705      Row Name 08/15/19 1030             Positioning and Restraints    Pre-Treatment Position  sitting in chair/recliner  -JM      In Chair  reclined;call light within reach;encouraged to call for assist;with family/caregiver  -JM      Recorded by [JM] Indira Benitez PTA 08/15/19 1705      Row Name 08/15/19 1030             Pain Scale: Numbers Pre/Post-Treatment    Pain Scale: Numbers, Pretreatment  6/10  -JM      Pain Scale: Numbers, Post-Treatment  7/10  -JM      Pain Location - Side  Right  -JM      Pain Location  knee  -JM      Pain Intervention(s)  Medication (See MAR);Repositioned;Cold applied  -JM      Recorded by [JM] Indira Benitez PTA 08/15/19 1705      Row Name                Wound 08/12/19 1050 Right knee Incision    Wound - Properties Group Date first assessed: 08/12/19 [MB] Time first assessed: 1050 [MB] Side: Right [MB] Location: knee [MB] Primary Wound Type: Incision [MB] Recorded by:  [MB] Sofie Rodriguez RN 08/12/19 1050      User Key  (r) = Recorded By, (t) = Taken By, (c) = Cosigned By    Initials Name Effective Dates Discipline    Sofie Spencer, RN 06/16/16 -  Nurse    Indira Wilson PTA 03/07/18 -  PT          Wound 08/12/19 1050 Right knee Incision (Active)   Dressing Appearance dry;intact;no drainage 8/15/2019  8:58 AM   Closure Other (Comment) 8/15/2019  8:58 AM   Base dressing in place, unable to visualize 8/15/2019  4:42 AM   Drainage Characteristics/Odor  serosanguineous 8/15/2019  8:58 AM   Drainage Amount scant 8/15/2019  8:58 AM   Dressing Care, Wound dressing changed 8/15/2019  8:58 AM           Physical Therapy Education     Title: PT OT SLP Therapies (Resolved)     Topic: Physical Therapy (Resolved)     Point: Mobility training (Resolved)     Learning Progress Summary           Patient Acceptance, E,TB,D, VU,NR by ALVARO at 8/14/2019 11:22 AM    Acceptance, E,TB,D, VU,NR by ALVARO at 8/13/2019  6:57 PM    Acceptance, E,D, NR,VU by MARJAN at 8/12/2019  4:16 PM   Family Acceptance, E,TB,D, VU,NR by ALVARO at 8/14/2019 11:22 AM    Acceptance, E,TB,D, VU,NR by ALVARO at 8/13/2019  6:57 PM                   Point: Home exercise program (Resolved)     Learning Progress Summary           Patient Acceptance, E,TB,D, VU,NR by ALVARO at 8/14/2019 11:22 AM    Acceptance, E,TB,D, VU,NR by ALVARO at 8/13/2019  6:57 PM    Acceptance, E,D, NR,VU by MARJAN at 8/12/2019  4:16 PM   Family Acceptance, E,TB,D, VU,NR by ALVARO at 8/14/2019 11:22 AM    Acceptance, E,TB,D, VU,NR by ALVARO at 8/13/2019  6:57 PM                   Point: Body mechanics (Resolved)     Learning Progress Summary           Patient Acceptance, E,TB,D, VU,NR by ALVARO at 8/14/2019 11:22 AM    Acceptance, E,TB,D, VU,NR by ALVARO at 8/13/2019  6:57 PM    Acceptance, E,D, NR,VU by MARJAN at 8/12/2019  4:16 PM   Family Acceptance, E,TB,D, VU,NR by ALVARO at 8/14/2019 11:22 AM    Acceptance, E,TB,D, VU,NR by ALVARO at 8/13/2019  6:57 PM                   Point: Precautions (Resolved)     Learning Progress Summary           Patient Acceptance, E,TB,D, VU,NR by ALVARO at 8/14/2019 11:22 AM    Acceptance, E,TB,D, VU,NR by ALVARO at 8/13/2019  6:57 PM    Acceptance, E,D, NR,VU by MARJAN at 8/12/2019  4:16 PM   Family Acceptance, SELAM MCKINNEY D, PAUL,NR by ALVARO at 8/14/2019 11:22 AM    HANK Kern TB, D, PAUL,NR by ALVARO at 8/13/2019  6:57 PM                               User Key     Initials Effective Dates Name Provider Type Discipline     04/03/18 -  Amanda Valencia, PT Physical Therapist PT    ALVARO  03/07/18 -  Indira Benitez PTA Physical Therapy Assistant PT                PT Recommendation and Plan     Plan of Care Reviewed With: patient, family  Outcome Summary: pt having pain issues , limiting incr amb dist, encouragement to incr dist 5ft  Outcome Measures     Row Name 08/14/19 1100 08/13/19 1800          How much help from another person do you currently need...    Turning from your back to your side while in flat bed without using bedrails?  3  -JM  3  -JM     Moving from lying on back to sitting on the side of a flat bed without bedrails?  3  -JM  3  -JM     Moving to and from a bed to a chair (including a wheelchair)?  3  -JM  3  -JM     Standing up from a chair using your arms (e.g., wheelchair, bedside chair)?  3  -JM  3  -JM     Climbing 3-5 steps with a railing?  1  -JM  1  -JM     To walk in hospital room?  3  -JM  3  -JM     AM-PAC 6 Clicks Score (PT)  16  -JM  16  -       User Key  (r) = Recorded By, (t) = Taken By, (c) = Cosigned By    Initials Name Provider Type    Indira Wilson PTA Physical Therapy Assistant         Time Calculation:   PT Charges     Row Name 08/15/19 1705             Time Calculation    Start Time  1030  -      Stop Time  1115  -      Time Calculation (min)  45 min  -      PT Received On  08/15/19  -         Time Calculation- PT    Total Timed Code Minutes- PT  20 minute(s)  -        User Key  (r) = Recorded By, (t) = Taken By, (c) = Cosigned By    Initials Name Provider Type    Indira Wilson PTA Physical Therapy Assistant        Therapy Charges for Today     Code Description Service Date Service Provider Modifiers Qty    59441465414 HC PT THER PROC EA 15 MIN 8/14/2019 Indira Benitez PTA GP 1    09143566350 HC PT THER PROC GROUP 8/14/2019 Indira Benitez PTA GP 1    67693296410 HC PT THER PROC EA 15 MIN 8/14/2019 Indira Benitez PTA GP 1    93238696841 HC PT THER PROC GROUP 8/14/2019 Indira Benitez PTA GP 1    06184885163 HC PT THER PROC  EA 15 MIN 8/15/2019 Indira Benitez, SHAJI GP 1    87151214927 HC PT THER PROC GROUP 8/15/2019 Indira Benitez PTA GP 1          PT G-Codes  Outcome Measure Options: AM-PAC 6 Clicks Basic Mobility (PT)  AM-PAC 6 Clicks Score (PT): 16    Indira Benitez PTA  8/15/2019

## 2019-08-15 NOTE — PROGRESS NOTES
Case Management Discharge Note    Final Note: skilled bed at [facility]    Destination - Selection Complete      Service Provider Request Status Selected Services Address Phone Number Fax Number    [facility] Nursing [address] [id number] [phone]       Rochelle Garcia RN 8/13/2019 1537    .8/13/2019 [facility] will accept pending Doreen Sanchez/Antonio following .Rochelle Garcia RN                   Durable Medical Equipment      No service has been selected for the patient.      Dialysis/Infusion      No service has been selected for the patient.      Home Medical Care      No service has been selected for the patient.      Therapy      No service has been selected for the patient.      Community Resources      No service has been selected for the patient.        Transportation Services  Private: Car    Final Discharge Disposition Code: 03 - skilled nursing facility (SNF)

## 2019-08-19 RX ORDER — ATORVASTATIN CALCIUM 20 MG/1
20 TABLET, FILM COATED ORAL DAILY
Qty: 90 TABLET | Refills: 0 | Status: SHIPPED | OUTPATIENT
Start: 2019-08-19 | End: 2019-09-09

## 2019-08-19 RX ORDER — EZETIMIBE 10 MG/1
10 TABLET ORAL DAILY
Qty: 90 TABLET | Refills: 0 | Status: SHIPPED | OUTPATIENT
Start: 2019-08-19 | End: 2019-11-13 | Stop reason: SDUPTHER

## 2019-08-19 RX ORDER — LISINOPRIL 10 MG/1
10 TABLET ORAL DAILY
Qty: 90 TABLET | Refills: 0 | Status: SHIPPED | OUTPATIENT
Start: 2019-08-19 | End: 2019-11-13 | Stop reason: SDUPTHER

## 2019-08-19 RX ORDER — ERGOCALCIFEROL 1.25 MG/1
CAPSULE ORAL
Qty: 13 CAPSULE | Refills: 0 | Status: SHIPPED | OUTPATIENT
Start: 2019-08-19 | End: 2019-11-13 | Stop reason: SDUPTHER

## 2019-08-19 RX ORDER — DILTIAZEM HYDROCHLORIDE 240 MG/1
CAPSULE, COATED, EXTENDED RELEASE ORAL
Qty: 90 CAPSULE | Refills: 0 | Status: SHIPPED | OUTPATIENT
Start: 2019-08-19 | End: 2019-11-13 | Stop reason: SDUPTHER

## 2019-08-19 RX ORDER — SERTRALINE HYDROCHLORIDE 100 MG/1
TABLET, FILM COATED ORAL
Qty: 135 TABLET | Refills: 0 | Status: SHIPPED | OUTPATIENT
Start: 2019-08-19 | End: 2019-11-13 | Stop reason: SDUPTHER

## 2019-09-03 RX ORDER — VALACYCLOVIR HYDROCHLORIDE 500 MG/1
500 TABLET, FILM COATED ORAL DAILY
Qty: 90 TABLET | Refills: 0 | Status: SHIPPED | OUTPATIENT
Start: 2019-09-03 | End: 2019-12-04 | Stop reason: SDUPTHER

## 2019-09-09 ENCOUNTER — OFFICE VISIT (OUTPATIENT)
Dept: INTERNAL MEDICINE | Facility: CLINIC | Age: 59
End: 2019-09-09

## 2019-09-09 VITALS
BODY MASS INDEX: 43.02 KG/M2 | DIASTOLIC BLOOD PRESSURE: 80 MMHG | HEART RATE: 96 BPM | WEIGHT: 252 LBS | TEMPERATURE: 98.3 F | SYSTOLIC BLOOD PRESSURE: 114 MMHG | OXYGEN SATURATION: 97 % | HEIGHT: 64 IN | RESPIRATION RATE: 16 BRPM

## 2019-09-09 DIAGNOSIS — I10 ESSENTIAL HYPERTENSION: Primary | ICD-10-CM

## 2019-09-09 DIAGNOSIS — E78.5 HYPERLIPIDEMIA, UNSPECIFIED HYPERLIPIDEMIA TYPE: ICD-10-CM

## 2019-09-09 DIAGNOSIS — F41.1 ANXIETY, GENERALIZED: ICD-10-CM

## 2019-09-09 DIAGNOSIS — K21.9 GASTROESOPHAGEAL REFLUX DISEASE, ESOPHAGITIS PRESENCE NOT SPECIFIED: ICD-10-CM

## 2019-09-09 DIAGNOSIS — M17.11 PRIMARY OSTEOARTHRITIS OF RIGHT KNEE: ICD-10-CM

## 2019-09-09 DIAGNOSIS — K58.0 IRRITABLE BOWEL SYNDROME WITH DIARRHEA: ICD-10-CM

## 2019-09-09 DIAGNOSIS — Z23 NEED FOR IMMUNIZATION AGAINST INFLUENZA: ICD-10-CM

## 2019-09-09 LAB
CHOLEST SERPL-MCNC: 181 MG/DL (ref 0–200)
HDLC SERPL-MCNC: 38 MG/DL (ref 40–60)
LDLC SERPL CALC-MCNC: 110 MG/DL (ref 0–100)
TRIGL SERPL-MCNC: 167 MG/DL (ref 0–150)
VLDLC SERPL CALC-MCNC: 33.4 MG/DL

## 2019-09-09 PROCEDURE — 99214 OFFICE O/P EST MOD 30 MIN: CPT | Performed by: INTERNAL MEDICINE

## 2019-09-09 PROCEDURE — 90471 IMMUNIZATION ADMIN: CPT | Performed by: INTERNAL MEDICINE

## 2019-09-09 PROCEDURE — 90674 CCIIV4 VAC NO PRSV 0.5 ML IM: CPT | Performed by: INTERNAL MEDICINE

## 2019-09-09 RX ORDER — ATORVASTATIN CALCIUM 20 MG/1
20 TABLET, FILM COATED ORAL DAILY
Qty: 90 TABLET | Refills: 1 | Status: SHIPPED | OUTPATIENT
Start: 2019-09-09 | End: 2020-06-09

## 2019-09-09 NOTE — PROGRESS NOTES
Subjective   Ann-Marie Eaton is a 59 y.o. female.     Chief Complaint   Patient presents with   • Hypertension   • Hyperlipidemia   • Heartburn   • Irritable Bowel Syndrome         Hypertension   This is a chronic problem. The current episode started more than 1 year ago. The problem is unchanged. The problem is controlled. Associated symptoms include peripheral edema. Pertinent negatives include no orthopnea, palpitations or shortness of breath. There are no associated agents to hypertension. Risk factors for coronary artery disease include dyslipidemia, post-menopausal state, stress, sedentary lifestyle and smoking/tobacco exposure. Current antihypertension treatment includes ACE inhibitors and calcium channel blockers. The current treatment provides significant improvement. There are no compliance problems.  There is no history of angina, kidney disease, CAD/MI, CVA or heart failure.   Hyperlipidemia   This is a chronic problem. The current episode started more than 1 year ago. The problem is controlled. Recent lipid tests were reviewed and are normal. Pertinent negatives include no shortness of breath. Current antihyperlipidemic treatment includes statins. The current treatment provides significant improvement of lipids. There are no compliance problems.  Risk factors for coronary artery disease include dyslipidemia, obesity, stress, post-menopausal and a sedentary lifestyle.   Irritable Bowel Syndrome   This is a chronic problem. Pertinent negatives include no chills, coughing, fatigue, nausea or vomiting.   Heartburn   She reports no coughing, no nausea or no wheezing. This is a chronic problem. The current episode started more than 1 year ago. The problem occurs occasionally. The problem has been unchanged. Pertinent negatives include no fatigue. She has tried a histamine-2 antagonist for the symptoms. The treatment provided significant relief.        The following portions of the patient's history were reviewed  and updated as appropriate: allergies, current medications, past social history and problem list.    Outpatient Medications Marked as Taking for the 9/9/19 encounter (Office Visit) with John Cowan MD   Medication Sig Dispense Refill   • acetaminophen (TYLENOL) 500 MG tablet Take 1,000 mg by mouth Every 6 (Six) Hours As Needed for Mild Pain .     • aspirin 81 MG tablet Take 81 mg by mouth Daily.     • diazePAM (VALIUM) 5 MG tablet Take 5 mg by mouth Daily As Needed (TEETH GRINDING).     • diltiaZEM CD (CARDIZEM CD) 240 MG 24 hr capsule TAKE 1 CAPSULE BY MOUTH DAILY 90 capsule 0   • ezetimibe (ZETIA) 10 MG tablet TAKE 1 TABLET BY MOUTH DAILY 90 tablet 0   • lisinopril (PRINIVIL,ZESTRIL) 10 MG tablet TAKE 1 TABLET BY MOUTH DAILY 90 tablet 0   • loperamide (IMODIUM) 2 MG capsule Take 8 mg by mouth As Needed for Diarrhea.     • Naproxen (NAPROSYN PO) Take 440 mg by mouth Daily As Needed (PAIN).     • Probiotic Product (PROBIOTIC DAILY PO) Take 1 tablet by mouth Daily. Taking while on antibiotic therapy.      • raNITIdine (ZANTAC) 150 MG tablet Take 1 tablet by mouth Every Night. 90 tablet 1   • sertraline (ZOLOFT) 100 MG tablet TAKE 1 AND 1/2 TABLETS BY MOUTH DAILY 135 tablet 0   • valACYclovir (VALTREX) 500 MG tablet TAKE 1 TABLET BY MOUTH DAILY 90 tablet 0   • vitamin D (ERGOCALCIFEROL) 13439 units capsule capsule TAKE 1 CAPSULE BY MOUTH ONCE A WEEK 13 capsule 0   • [DISCONTINUED] atorvastatin (LIPITOR) 20 MG tablet TAKE 1 TABLET BY MOUTH DAILY 90 tablet 0       Review of Systems   Constitutional: Negative for chills and fatigue.   Respiratory: Negative for cough, shortness of breath and wheezing.    Cardiovascular: Negative for palpitations, orthopnea and leg swelling.   Gastrointestinal: Positive for diarrhea. Negative for constipation, nausea and vomiting.       Objective   Vitals:    09/09/19 1035   BP: 114/80   Pulse: 96   Resp: 16   Temp: 98.3 °F (36.8 °C)   SpO2: 97%          09/09/19  1035   Weight: 114 kg  (252 lb)    [unfilled]  Body mass index is 43.23 kg/m².      Physical Exam   Constitutional: She appears well-developed and well-nourished. No distress.   HENT:   Head: Normocephalic and atraumatic.   Neck: Carotid bruit is not present. No thyromegaly present.   Cardiovascular: Normal rate, regular rhythm, normal heart sounds and intact distal pulses. Exam reveals no gallop.   No murmur heard.  Pulmonary/Chest: Effort normal and breath sounds normal. No respiratory distress. She has no wheezes. She has no rales.   Abdominal: Soft. Bowel sounds are normal. She exhibits no mass. There is no tenderness. There is no guarding.         Problem List Items Addressed This Visit        Cardiovascular and Mediastinum    Hyperlipidemia    Hypertension - Primary       Digestive    GERD (gastroesophageal reflux disease)    IBS (irritable bowel syndrome)       Musculoskeletal and Integument    Osteoarthritis, knee       Other    Anxiety, generalized        Assessment/Plan   In for recheck of htn, lipids, IBS, and Gerd.  Has diarrhea predominant irritable bowel which is doing pretty well overall.  Takes Imodium when necessary.  Has had increased dose recently for worsening diarrhea.  For now she is off of probiotics.  Annual labs 5/19.  Lipids q 3 mos. still fatigued and more so since surgery.  Right total knee replacement about 5 weeks ago.  Sertraline dose is back to 150 mg daily.  Depression is better now.  Due for Shingrix.  Hopefully when she gets off of NSAID use her diarrhea will improve.  Still using them post knee replacement.  Flu shot updated today.  She is off of atorvastatin for some reason and will resume it now.  Remains on Zetia.  Annual preventive exam next time December 2019.  She is 3 HPP today.    Current outpatient and discharge medications have been reconciled for the patient.  Reviewed by: MD Shree Tamez disclaimer:   Much of this encounter note is an electronic  transcription/translation of spoken language to printed text. The electronic translation of spoken language may permit erroneous, or at times, nonsensical words or phrases to be inadvertently transcribed; Although I have reviewed the note for such errors, some may still exist.

## 2019-09-09 NOTE — PATIENT INSTRUCTIONS
Influenza Virus Vaccine injection  What is this medicine?  INFLUENZA VIRUS VACCINE (in floo EN [facility] SEEN) helps to reduce the risk of getting influenza also known as the flu. The vaccine only helps protect you against some strains of the flu.  This medicine may be used for other purposes; ask your health care provider or pharmacist if you have questions.  COMMON BRAND NAME(S): Afluria, Agriflu, Alfuria, FLUAD, Fluarix, Fluarix Quadrivalent, Flublok, Flublok Quadrivalent, FLUCELVAX, Flulaval, Fluvirin, Fluzone, Fluzone High-Dose, Fluzone Intradermal  What should I tell my health care provider before I take this medicine?  They need to know if you have any of these conditions:  -bleeding disorder like hemophilia  -fever or infection  -Guillain-[place] syndrome or other neurological problems  -immune system problems  -infection with the human immunodeficiency virus (HIV) or AIDS  -low blood platelet counts  -multiple sclerosis  -an unusual or allergic reaction to influenza virus vaccine, latex, other medicines, foods, dyes, or preservatives. Different brands of vaccines contain different allergens. Some may contain latex or eggs. Talk to your doctor about your allergies to make sure that you get the right vaccine.  -pregnant or trying to get pregnant  -breast-feeding  How should I use this medicine?  This vaccine is for injection into a muscle or under the skin. It is given by a health care professional.  A copy of Vaccine Information Statements will be given before each vaccination. Read this sheet carefully each time. The sheet may change frequently.  Talk to your healthcare provider to see which vaccines are right for you. Some vaccines should not be used in all age groups.  Overdosage: If you think you have taken too much of this medicine contact a poison control center or emergency room at once.  NOTE: This medicine is only for you. Do not share this medicine with others.  What if I miss a dose?  This  does not apply.  What may interact with this medicine?  -chemotherapy or radiation therapy  -medicines that lower your immune system like etanercept, anakinra, infliximab, and adalimumab  -medicines that treat or prevent blood clots like warfarin  -phenytoin  -steroid medicines like prednisone or cortisone  -theophylline  -vaccines  This list may not describe all possible interactions. Give your health care provider a list of all the medicines, herbs, non-prescription drugs, or dietary supplements you use. Also tell them if you smoke, drink alcohol, or use illegal drugs. Some items may interact with your medicine.  What should I watch for while using this medicine?  Report any side effects that do not go away within 3 days to your doctor or health care professional. Call your health care provider if any unusual symptoms occur within 6 weeks of receiving this vaccine.  You may still catch the flu, but the illness is not usually as bad. You cannot get the flu from the vaccine. The vaccine will not protect against colds or other illnesses that may cause fever. The vaccine is needed every year.  What side effects may I notice from receiving this medicine?  Side effects that you should report to your doctor or health care professional as soon as possible:  -allergic reactions like skin rash, itching or hives, swelling of the face, lips, or tongue  Side effects that usually do not require medical attention (report to your doctor or health care professional if they continue or are bothersome):  -fever  -headache  -muscle aches and pains  -pain, tenderness, redness, or swelling at the injection site  -tiredness  This list may not describe all possible side effects. Call your doctor for medical advice about side effects. You may report side effects to FDA at 5-299-FDA-4256.  Where should I keep my medicine?  The vaccine will be given by a health care professional in a clinic, pharmacy, doctor's office, or other health care  setting. You will not be given vaccine doses to store at home.  NOTE: This sheet is a summary. It may not cover all possible information. If you have questions about this medicine, talk to your doctor, pharmacist, or health care provider.  © 2019 [employer] (2016-07-08 10:07:28)

## 2019-11-13 RX ORDER — EZETIMIBE 10 MG/1
10 TABLET ORAL DAILY
Qty: 90 TABLET | Refills: 0 | Status: SHIPPED | OUTPATIENT
Start: 2019-11-13 | End: 2020-02-10

## 2019-11-13 RX ORDER — LISINOPRIL 10 MG/1
10 TABLET ORAL DAILY
Qty: 90 TABLET | Refills: 0 | Status: SHIPPED | OUTPATIENT
Start: 2019-11-13 | End: 2020-02-10

## 2019-11-13 RX ORDER — SERTRALINE HYDROCHLORIDE 100 MG/1
TABLET, FILM COATED ORAL
Qty: 135 TABLET | Refills: 0 | Status: SHIPPED | OUTPATIENT
Start: 2019-11-13 | End: 2020-02-10

## 2019-11-13 RX ORDER — DILTIAZEM HYDROCHLORIDE 240 MG/1
CAPSULE, COATED, EXTENDED RELEASE ORAL
Qty: 90 CAPSULE | Refills: 0 | Status: SHIPPED | OUTPATIENT
Start: 2019-11-13 | End: 2020-03-10

## 2019-11-13 RX ORDER — ERGOCALCIFEROL 1.25 MG/1
CAPSULE ORAL
Qty: 13 CAPSULE | Refills: 0 | Status: SHIPPED | OUTPATIENT
Start: 2019-11-13 | End: 2020-02-10

## 2019-12-04 RX ORDER — VALACYCLOVIR HYDROCHLORIDE 500 MG/1
500 TABLET, FILM COATED ORAL DAILY
Qty: 90 TABLET | Refills: 0 | Status: SHIPPED | OUTPATIENT
Start: 2019-12-04 | End: 2020-03-16

## 2020-01-09 ENCOUNTER — OFFICE VISIT (OUTPATIENT)
Dept: INTERNAL MEDICINE | Facility: CLINIC | Age: 60
End: 2020-01-09

## 2020-01-09 VITALS
BODY MASS INDEX: 44.63 KG/M2 | OXYGEN SATURATION: 93 % | HEART RATE: 76 BPM | WEIGHT: 261.4 LBS | RESPIRATION RATE: 16 BRPM | TEMPERATURE: 98.2 F | HEIGHT: 64 IN | SYSTOLIC BLOOD PRESSURE: 136 MMHG | DIASTOLIC BLOOD PRESSURE: 70 MMHG

## 2020-01-09 DIAGNOSIS — K21.9 GASTROESOPHAGEAL REFLUX DISEASE, ESOPHAGITIS PRESENCE NOT SPECIFIED: ICD-10-CM

## 2020-01-09 DIAGNOSIS — I10 ESSENTIAL HYPERTENSION: ICD-10-CM

## 2020-01-09 DIAGNOSIS — G47.33 OSA (OBSTRUCTIVE SLEEP APNEA): ICD-10-CM

## 2020-01-09 DIAGNOSIS — E78.5 HYPERLIPIDEMIA, UNSPECIFIED HYPERLIPIDEMIA TYPE: ICD-10-CM

## 2020-01-09 DIAGNOSIS — Z00.00 ENCOUNTER FOR PREVENTIVE HEALTH EXAMINATION: Primary | ICD-10-CM

## 2020-01-09 DIAGNOSIS — K58.0 IRRITABLE BOWEL SYNDROME WITH DIARRHEA: ICD-10-CM

## 2020-01-09 PROBLEM — T85.79XA: Status: RESOLVED | Noted: 2018-05-27 | Resolved: 2020-01-09

## 2020-01-09 PROCEDURE — 99396 PREV VISIT EST AGE 40-64: CPT | Performed by: INTERNAL MEDICINE

## 2020-01-09 RX ORDER — HYDROCHLOROTHIAZIDE 12.5 MG/1
12.5 CAPSULE, GELATIN COATED ORAL DAILY
Qty: 90 CAPSULE | Refills: 1 | Status: SHIPPED | OUTPATIENT
Start: 2020-01-09 | End: 2020-06-09

## 2020-01-09 RX ORDER — OMEPRAZOLE 20 MG/1
20 CAPSULE, DELAYED RELEASE ORAL DAILY
COMMUNITY
End: 2021-03-23

## 2020-01-09 NOTE — PROGRESS NOTES
Subjective   Ann-Marie Eaton is a 59 y.o. female.     Chief Complaint   Patient presents with   • Annual Exam         In for annual preventative exam.  Sleeps 6-7 hours per night.  No exercise.  Energy is poor.  Diet is good.       The following portions of the patient's history were reviewed and updated as appropriate: allergies, current medications, past social history and problem list.    HISTORY  Outpatient Medications Marked as Taking for the 1/9/20 encounter (Office Visit) with John Cowan MD   Medication Sig Dispense Refill   • aspirin 81 MG tablet Take 81 mg by mouth Daily.     • atorvastatin (LIPITOR) 20 MG tablet Take 1 tablet by mouth Daily. 90 tablet 1   • dilTIAZem CD (CARDIZEM CD) 240 MG 24 hr capsule TAKE 1 CAPSULE BY MOUTH DAILY 90 capsule 0   • ezetimibe (ZETIA) 10 MG tablet TAKE 1 TABLET BY MOUTH DAILY 90 tablet 0   • lisinopril (PRINIVIL,ZESTRIL) 10 MG tablet TAKE 1 TABLET BY MOUTH DAILY 90 tablet 0   • loperamide (IMODIUM) 2 MG capsule Take 8 mg by mouth As Needed for Diarrhea.     • Naproxen (NAPROSYN PO) Take 440 mg by mouth Daily As Needed (PAIN).     • omeprazole (priLOSEC) 20 MG capsule Take 20 mg by mouth Daily.     • Probiotic Product (PROBIOTIC DAILY PO) Take 1 tablet by mouth Daily. Taking while on antibiotic therapy.      • sertraline (ZOLOFT) 100 MG tablet TAKE 1 AND 1/2 TABLETS BY MOUTH DAILY 135 tablet 0   • valACYclovir (VALTREX) 500 MG tablet TAKE 1 TABLET BY MOUTH DAILY 90 tablet 0   • vitamin D (ERGOCALCIFEROL) 1.25 MG (71515 UT) capsule capsule TAKE 1 CAPSULE BY MOUTH ONCE A WEEK 13 capsule 0   • [DISCONTINUED] acetaminophen (TYLENOL) 500 MG tablet Take 1,000 mg by mouth Every 6 (Six) Hours As Needed for Mild Pain .       Social History     Socioeconomic History   • Marital status:      Spouse name: Not on file   • Number of children: Not on file   • Years of education: Not on file   • Highest education level: Not on file   Tobacco Use   • Smoking status: Former Smoker      Packs/day: 1.00     Years: 15.00     Pack years: 15.00     Types: Cigarettes     Last attempt to quit: 2006     Years since quittin.0   • Smokeless tobacco: Never Used   Substance and Sexual Activity   • Alcohol use: Yes     Frequency: Monthly or less     Drinks per session: 1 or 2     Binge frequency: Never     Comment: 1 MONTHLY   • Drug use: No     Family History   Problem Relation Age of Onset   • Lung cancer Mother    • Aneurysm Father    • COPD Father    • No Known Problems Brother    • No Known Problems Daughter    • No Known Problems Sister    • Malig Hyperthermia Neg Hx      Past Medical History:   Diagnosis Date   • Anxiety    • DDD (degenerative disc disease), lumbar    • Eczema    • Fatty liver disease, nonalcoholic    • GERD (gastroesophageal reflux disease)    • History of concussion    • HLD (hyperlipidemia)    • HTN (hypertension)    • IBS (irritable bowel syndrome)    • SHANNAN (obstructive sleep apnea)     NO MACHINE   • Osteoarthritis of right knee    • Urinary incontinence in female     WEARS PADS   • Urolithiasis    • Vitamin D deficiency      Past Surgical History:   Procedure Laterality Date   • APPENDECTOMY     • CHOLECYSTECTOMY     • CYSTOSCOPY W/ LASER LITHOTRIPSY  2014   • EXTRACORPOREAL SHOCK WAVE LITHOTRIPSY (ESWL)      STENT  X2   • MT TOTAL KNEE ARTHROPLASTY Left 2016    Procedure: LT TOTAL KNEE ARTHROPLASTY;  Surgeon: Cam Ryan MD;  Location: [facility] OR;  Service: Orthopedics   • TONSILLECTOMY     • TOTAL KNEE ARTHROPLASTY Right 2019    Procedure: RIGHT TOTAL KNEE ARTHROPLASTY;  Surgeon: Cam Ryan MD;  Location: [facility] OR;  Service: Orthopedics   • URETEROTOMY  2014    R Stent- stone       Review of Systems   Constitutional: Negative for appetite change, chills, fatigue and fever.   HENT: Negative for congestion, ear pain, hearing loss, nosebleeds, postnasal drip, rhinorrhea, sinus pressure and trouble swallowing.    Eyes: Negative for  pain, itching and visual disturbance.   Respiratory: Positive for wheezing. Negative for cough, chest tightness and shortness of breath.    Cardiovascular: Negative for chest pain, palpitations and leg swelling.   Gastrointestinal: Positive for abdominal pain (GERD), constipation, diarrhea and nausea ( spells). Negative for anal bleeding, rectal pain and vomiting.   Endocrine: Negative for cold intolerance, heat intolerance and polyuria.   Genitourinary: Negative for difficulty urinating, dysuria, flank pain, frequency, hematuria and urgency.   Musculoskeletal: Positive for arthralgias (right knee) and back pain. Negative for myalgias.   Skin: Negative for rash.   Allergic/Immunologic: Positive for environmental allergies.   Neurological: Positive for headaches. Negative for dizziness, syncope, speech difficulty, weakness, light-headedness and numbness.   Hematological: Does not bruise/bleed easily.   Psychiatric/Behavioral: Positive for dysphoric mood. Negative for agitation, confusion and sleep disturbance. The patient is not nervous/anxious.        Objective   Vitals:    01/09/20 1315   BP: 136/70   Pulse: 76   Resp: 16   Temp: 98.2 °F (36.8 °C)   SpO2: 93%          01/09/20  1315   Weight: 119 kg (261 lb 6.4 oz)    [unfilled]  Body mass index is 44.85 kg/m².      Physical Exam   Constitutional: She is oriented to person, place, and time. She appears well-developed and well-nourished.   HENT:   Head: Normocephalic and atraumatic.   Right Ear: External ear normal.   Left Ear: External ear normal.   Nose: Nose normal.   Mouth/Throat: Oropharynx is clear and moist.   Eyes: Pupils are equal, round, and reactive to light. Conjunctivae and EOM are normal.   Neck: Normal range of motion. Neck supple. No JVD present. No thyromegaly present.   Cardiovascular: Normal rate, regular rhythm, normal heart sounds and intact distal pulses. Exam reveals no gallop.   No murmur heard.  Pulmonary/Chest: Effort normal and breath  sounds normal. No respiratory distress. She has no wheezes. She has no rales.   Abdominal: Soft. Bowel sounds are normal. She exhibits no distension and no mass. There is no tenderness. There is no guarding. No hernia.   Musculoskeletal: Normal range of motion. She exhibits edema (1-2+/=).   Lymphadenopathy:     She has no cervical adenopathy.   Neurological: She is alert and oriented to person, place, and time. She displays normal reflexes. No cranial nerve deficit. Coordination normal.   Skin: Skin is warm and dry.   Psychiatric: She has a normal mood and affect. Her behavior is normal. Judgment and thought content normal.   Nursing note and vitals reviewed.        Problem List Items Addressed This Visit        Cardiovascular and Mediastinum    Hyperlipidemia    Hypertension       Respiratory    SHANNAN (obstructive sleep apnea)       Digestive    GERD (gastroesophageal reflux disease)    Relevant Medications    omeprazole (priLOSEC) 20 MG capsule    IBS (irritable bowel syndrome)      Other Visit Diagnoses     Encounter for preventive health examination    -  Primary        Assessment/Plan   In for annual preventative exam and recheck of htn, lipids, IBS, and Gerd.  Has diarrhea predominant irritable bowel which is doing pretty well overall.  Takes Imodium when necessary.  For now she is improved on probiotics.  Annual labs 8/2019.  Lipids q 3 mos.  Sertraline dose is back to 150 mg daily.  Depression is better now.  Best I have seen her in several years.  Due for Shingrix.  She is fasting today.  Will discontinue aspirin.  We will add HCTZ 12.5 mg daily for blood pressure control.  Monitor blood pressure at home.  If blood pressure goes under 115 she will need to stop her diltiazem.  She has had some issues with shoulder pain in the left shoulder for 6 weeks or so.  Needs to do some shoulder exercises, especially range of motion exercises.    Prevention counseling was performed today. The counseling performed was  routine health maintenance topics.         Dragon disclaimer:   Much of this encounter note is an electronic transcription/translation of spoken language to printed text. The electronic translation of spoken language may permit erroneous, or at times, nonsensical words or phrases to be inadvertently transcribed; Although I have reviewed the note for such errors, some may still exist.

## 2020-01-10 LAB
CHOLEST SERPL-MCNC: 127 MG/DL (ref 0–200)
HDLC SERPL-MCNC: 41 MG/DL (ref 40–60)
LDLC SERPL CALC-MCNC: 65 MG/DL (ref 0–100)
TRIGL SERPL-MCNC: 106 MG/DL (ref 0–150)
VLDLC SERPL CALC-MCNC: 21.2 MG/DL

## 2020-02-10 RX ORDER — EZETIMIBE 10 MG/1
10 TABLET ORAL DAILY
Qty: 90 TABLET | Refills: 0 | Status: SHIPPED | OUTPATIENT
Start: 2020-02-10 | End: 2020-05-08

## 2020-02-10 RX ORDER — LISINOPRIL 10 MG/1
10 TABLET ORAL DAILY
Qty: 90 TABLET | Refills: 0 | Status: SHIPPED | OUTPATIENT
Start: 2020-02-10 | End: 2020-05-08

## 2020-02-10 RX ORDER — SERTRALINE HYDROCHLORIDE 100 MG/1
TABLET, FILM COATED ORAL
Qty: 135 TABLET | Refills: 0 | Status: SHIPPED | OUTPATIENT
Start: 2020-02-10 | End: 2020-05-08

## 2020-02-10 RX ORDER — ERGOCALCIFEROL 1.25 MG/1
CAPSULE ORAL
Qty: 13 CAPSULE | Refills: 0 | Status: SHIPPED | OUTPATIENT
Start: 2020-02-10 | End: 2020-05-08

## 2020-03-10 ENCOUNTER — OFFICE VISIT (OUTPATIENT)
Dept: INTERNAL MEDICINE | Facility: CLINIC | Age: 60
End: 2020-03-10

## 2020-03-10 VITALS
BODY MASS INDEX: 43.19 KG/M2 | TEMPERATURE: 98.5 F | HEIGHT: 64 IN | DIASTOLIC BLOOD PRESSURE: 82 MMHG | OXYGEN SATURATION: 97 % | HEART RATE: 96 BPM | WEIGHT: 253 LBS | RESPIRATION RATE: 16 BRPM | SYSTOLIC BLOOD PRESSURE: 118 MMHG

## 2020-03-10 DIAGNOSIS — J06.9 VIRAL UPPER RESPIRATORY TRACT INFECTION: Primary | ICD-10-CM

## 2020-03-10 DIAGNOSIS — J01.10 ACUTE NON-RECURRENT FRONTAL SINUSITIS: ICD-10-CM

## 2020-03-10 PROCEDURE — 99213 OFFICE O/P EST LOW 20 MIN: CPT | Performed by: INTERNAL MEDICINE

## 2020-03-10 RX ORDER — AMOXICILLIN 500 MG/1
1000 CAPSULE ORAL 2 TIMES DAILY
Qty: 28 CAPSULE | Refills: 0 | Status: SHIPPED | OUTPATIENT
Start: 2020-03-10 | End: 2020-07-20

## 2020-03-10 NOTE — PROGRESS NOTES
Subjective   Ann-Marie Eaton is a 60 y.o. female.     Chief Complaint   Patient presents with   • Sinus Problem         Sinus Problem   This is a new problem. The current episode started 1 to 4 weeks ago. The problem is unchanged. Associated symptoms include headaches, a hoarse voice, sinus pressure and a sore throat. Pertinent negatives include no chills or shortness of breath. Past treatments include oral decongestants. The treatment provided no relief.        The following portions of the patient's history were reviewed and updated as appropriate: allergies, current medications, past social history and problem list.    Outpatient Medications Marked as Taking for the 3/10/20 encounter (Office Visit) with John Cowan MD   Medication Sig Dispense Refill   • aspirin 81 MG tablet Take 81 mg by mouth Daily.     • atorvastatin (LIPITOR) 20 MG tablet Take 1 tablet by mouth Daily. 90 tablet 1   • ezetimibe (ZETIA) 10 MG tablet TAKE 1 TABLET BY MOUTH DAILY 90 tablet 0   • hydroCHLOROthiazide (MICROZIDE) 12.5 MG capsule Take 1 capsule by mouth Daily. 90 capsule 1   • lisinopril (PRINIVIL,ZESTRIL) 10 MG tablet TAKE 1 TABLET BY MOUTH DAILY 90 tablet 0   • loperamide (IMODIUM) 2 MG capsule Take 8 mg by mouth As Needed for Diarrhea.     • Naproxen (NAPROSYN PO) Take 440 mg by mouth Daily As Needed (PAIN).     • omeprazole (priLOSEC) 20 MG capsule Take 20 mg by mouth Daily.     • Probiotic Product (PROBIOTIC DAILY PO) Take 1 tablet by mouth Daily. Taking while on antibiotic therapy.      • sertraline (ZOLOFT) 100 MG tablet TAKE 1 AND 1/2 TABLETS BY MOUTH DAILY 135 tablet 0   • valACYclovir (VALTREX) 500 MG tablet TAKE 1 TABLET BY MOUTH DAILY 90 tablet 0   • vitamin D (ERGOCALCIFEROL) 1.25 MG (30857 UT) capsule capsule TAKE 1 CAPSULE BY MOUTH ONCE A WEEK 13 capsule 0   • [DISCONTINUED] dilTIAZem CD (CARDIZEM CD) 240 MG 24 hr capsule TAKE 1 CAPSULE BY MOUTH DAILY 90 capsule 0       Review of Systems   Constitutional: Negative for  chills.   HENT: Positive for hoarse voice, sinus pressure and sore throat.    Respiratory: Negative for shortness of breath.    Neurological: Positive for headaches.       Objective   Vitals:    03/10/20 1251   BP: 118/82   Pulse: 96   Resp: 16   Temp: 98.5 °F (36.9 °C)   SpO2: 97%      Wt Readings from Last 3 Encounters:   03/10/20 115 kg (253 lb)   01/09/20 119 kg (261 lb 6.4 oz)   09/09/19 114 kg (252 lb)    Body mass index is 43.41 kg/m².      Physical Exam   Constitutional: She appears well-developed and well-nourished.   HENT:   Head: Normocephalic and atraumatic.   Right Ear: External ear normal.   Left Ear: External ear normal.   Nose: Nose normal.   Mouth/Throat: Oropharynx is clear and moist.   Eyes: Pupils are equal, round, and reactive to light. Conjunctivae are normal.   Pulmonary/Chest: Effort normal and breath sounds normal. No respiratory distress. She has no wheezes. She has no rales.   Skin: Skin is warm and dry.         Problem List Items Addressed This Visit     None      Visit Diagnoses     Viral upper respiratory tract infection    -  Primary        Assessment/Plan   In with 2 weeks of head congestion.  Dull forehead headache.  Trouble hearing out of the left ear.  Eye congestion.  It sounds like sinusitis.  2 weeks is long enough to treat.  Will begin on amoxicillin 1000 mg twice daily for 1 week.  Continue with Zyrtec and Flonase.           Dragon disclaimer:   Much of this encounter note is an electronic transcription/translation of spoken language to printed text. The electronic translation of spoken language may permit erroneous, or at times, nonsensical words or phrases to be inadvertently transcribed; Although I have reviewed the note for such errors, some may still exist.

## 2020-03-16 RX ORDER — VALACYCLOVIR HYDROCHLORIDE 500 MG/1
500 TABLET, FILM COATED ORAL DAILY
Qty: 90 TABLET | Refills: 0 | Status: SHIPPED | OUTPATIENT
Start: 2020-03-16 | End: 2020-06-09

## 2020-05-08 RX ORDER — ERGOCALCIFEROL 1.25 MG/1
CAPSULE ORAL
Qty: 13 CAPSULE | Refills: 1 | Status: SHIPPED | OUTPATIENT
Start: 2020-05-08 | End: 2020-11-10

## 2020-05-08 RX ORDER — SERTRALINE HYDROCHLORIDE 100 MG/1
TABLET, FILM COATED ORAL
Qty: 135 TABLET | Refills: 1 | Status: SHIPPED | OUTPATIENT
Start: 2020-05-08 | End: 2020-11-10

## 2020-05-08 RX ORDER — EZETIMIBE 10 MG/1
10 TABLET ORAL DAILY
Qty: 90 TABLET | Refills: 1 | Status: SHIPPED | OUTPATIENT
Start: 2020-05-08 | End: 2020-11-10

## 2020-05-08 RX ORDER — LISINOPRIL 10 MG/1
10 TABLET ORAL DAILY
Qty: 90 TABLET | Refills: 1 | Status: SHIPPED | OUTPATIENT
Start: 2020-05-08 | End: 2020-11-10

## 2020-06-09 RX ORDER — ATORVASTATIN CALCIUM 20 MG/1
20 TABLET, FILM COATED ORAL DAILY
Qty: 90 TABLET | Refills: 1 | Status: SHIPPED | OUTPATIENT
Start: 2020-06-09

## 2020-06-09 RX ORDER — VALACYCLOVIR HYDROCHLORIDE 500 MG/1
500 TABLET, FILM COATED ORAL DAILY
Qty: 90 TABLET | Refills: 0 | Status: SHIPPED | OUTPATIENT
Start: 2020-06-09 | End: 2020-08-31

## 2020-06-09 RX ORDER — HYDROCHLOROTHIAZIDE 12.5 MG/1
12.5 CAPSULE, GELATIN COATED ORAL DAILY
Qty: 90 CAPSULE | Refills: 1 | Status: SHIPPED | OUTPATIENT
Start: 2020-06-09 | End: 2020-11-23

## 2020-07-20 ENCOUNTER — OFFICE VISIT (OUTPATIENT)
Dept: INTERNAL MEDICINE | Facility: CLINIC | Age: 60
End: 2020-07-20

## 2020-07-20 VITALS
OXYGEN SATURATION: 96 % | WEIGHT: 248 LBS | HEIGHT: 64 IN | SYSTOLIC BLOOD PRESSURE: 124 MMHG | RESPIRATION RATE: 16 BRPM | DIASTOLIC BLOOD PRESSURE: 86 MMHG | TEMPERATURE: 96.4 F | HEART RATE: 90 BPM | BODY MASS INDEX: 42.34 KG/M2

## 2020-07-20 DIAGNOSIS — R10.31 RIGHT LOWER QUADRANT ABDOMINAL PAIN: ICD-10-CM

## 2020-07-20 DIAGNOSIS — K58.0 IRRITABLE BOWEL SYNDROME WITH DIARRHEA: ICD-10-CM

## 2020-07-20 DIAGNOSIS — Z79.899 MEDICATION MANAGEMENT: ICD-10-CM

## 2020-07-20 DIAGNOSIS — I10 ESSENTIAL HYPERTENSION: Primary | ICD-10-CM

## 2020-07-20 DIAGNOSIS — E78.5 HYPERLIPIDEMIA, UNSPECIFIED HYPERLIPIDEMIA TYPE: ICD-10-CM

## 2020-07-20 DIAGNOSIS — F41.1 ANXIETY, GENERALIZED: ICD-10-CM

## 2020-07-20 DIAGNOSIS — K21.9 GASTROESOPHAGEAL REFLUX DISEASE, ESOPHAGITIS PRESENCE NOT SPECIFIED: ICD-10-CM

## 2020-07-20 PROCEDURE — 99214 OFFICE O/P EST MOD 30 MIN: CPT | Performed by: INTERNAL MEDICINE

## 2020-07-20 RX ORDER — AZELASTINE HYDROCHLORIDE 0.5 MG/ML
1 SOLUTION/ DROPS OPHTHALMIC 2 TIMES DAILY
Qty: 1 EACH | Refills: 3 | Status: SHIPPED | OUTPATIENT
Start: 2020-07-20 | End: 2021-03-23

## 2020-07-20 RX ORDER — DICYCLOMINE HYDROCHLORIDE 10 MG/1
10 CAPSULE ORAL
Qty: 60 CAPSULE | Refills: 3 | Status: SHIPPED | OUTPATIENT
Start: 2020-07-20 | End: 2020-11-23

## 2020-07-20 NOTE — PROGRESS NOTES
Subjective   Ann-Marie Eaton is a 60 y.o. female.     Chief Complaint   Patient presents with   • Hypertension   • Hyperlipidemia   • Heartburn   • Sleep Apnea         Abdominal Pain   This is a recurrent problem. The current episode started more than 1 month ago. The onset quality is gradual. The problem occurs daily. The most recent episode lasted 2 hours. The problem has been gradually worsening. The pain is located in the RLQ. The pain is at a severity of 4/10. The quality of the pain is cramping and dull. The abdominal pain does not radiate. Associated symptoms include anorexia, belching, diarrhea, flatus and headaches. Pertinent negatives include no arthralgias, constipation, dysuria, fever, frequency, hematochezia, hematuria, melena, myalgias, nausea or vomiting. The pain is aggravated by bowel movement. The pain is relieved by bowel movements and passing flatus. Her past medical history is significant for GERD and irritable bowel syndrome.   Hypertension   This is a chronic problem. The current episode started more than 1 year ago. The problem is unchanged. The problem is controlled. Associated symptoms include headaches. Pertinent negatives include no orthopnea, palpitations, peripheral edema or shortness of breath. There are no associated agents to hypertension. Risk factors for coronary artery disease include dyslipidemia, post-menopausal state, stress, sedentary lifestyle and smoking/tobacco exposure. Current antihypertension treatment includes ACE inhibitors and calcium channel blockers. The current treatment provides significant improvement. There are no compliance problems.  There is no history of angina, kidney disease, CAD/MI, CVA or heart failure.   Hyperlipidemia   This is a chronic problem. The current episode started more than 1 year ago. The problem is controlled. Recent lipid tests were reviewed and are normal. Pertinent negatives include no myalgias or shortness of breath. Current  antihyperlipidemic treatment includes statins. The current treatment provides significant improvement of lipids. There are no compliance problems.  Risk factors for coronary artery disease include dyslipidemia, obesity, stress, post-menopausal and a sedentary lifestyle.   Irritable Bowel Syndrome   This is a chronic problem. Associated symptoms include abdominal pain, anorexia and headaches. Pertinent negatives include no arthralgias, chills, coughing, fatigue, fever, myalgias, nausea or vomiting.   Heartburn   She complains of abdominal pain and belching. She reports no coughing, no nausea or no wheezing. This is a chronic problem. The current episode started more than 1 year ago. The problem occurs occasionally. The problem has been unchanged. Pertinent negatives include no fatigue or melena. She has tried a histamine-2 antagonist for the symptoms. The treatment provided significant relief.        The following portions of the patient's history were reviewed and updated as appropriate: allergies, current medications, past social history and problem list.    Outpatient Medications Marked as Taking for the 7/20/20 encounter (Office Visit) with John Cowan MD   Medication Sig Dispense Refill   • atorvastatin (LIPITOR) 20 MG tablet TAKE 1 TABLET BY MOUTH DAILY 90 tablet 1   • ezetimibe (ZETIA) 10 MG tablet TAKE 1 TABLET BY MOUTH DAILY 90 tablet 1   • hydroCHLOROthiazide (MICROZIDE) 12.5 MG capsule TAKE 1 CAPSULE BY MOUTH DAILY 90 capsule 1   • lisinopril (PRINIVIL,ZESTRIL) 10 MG tablet TAKE 1 TABLET BY MOUTH DAILY 90 tablet 1   • loperamide (IMODIUM) 2 MG capsule Take 8 mg by mouth As Needed for Diarrhea.     • Naproxen (NAPROSYN PO) Take 440 mg by mouth Daily As Needed (PAIN).     • omeprazole (priLOSEC) 20 MG capsule Take 20 mg by mouth Daily.     • Probiotic Product (PROBIOTIC DAILY PO) Take 1 tablet by mouth Daily. Taking while on antibiotic therapy.      • sertraline (ZOLOFT) 100 MG tablet TAKE 1 AND 1/2  TABLETS BY MOUTH DAILY 135 tablet 1   • valACYclovir (VALTREX) 500 MG tablet TAKE 1 TABLET BY MOUTH DAILY 90 tablet 0   • vitamin D (ERGOCALCIFEROL) 1.25 MG (74892 UT) capsule capsule TAKE 1 CAPSULE BY MOUTH 1 TIME A WEEK 13 capsule 1       Review of Systems   Constitutional: Negative for chills, fatigue and fever.   Respiratory: Negative for cough, shortness of breath and wheezing.    Cardiovascular: Negative for palpitations, orthopnea and leg swelling.   Gastrointestinal: Positive for abdominal pain, anorexia, diarrhea and flatus. Negative for constipation, hematochezia, melena, nausea and vomiting.   Genitourinary: Negative for dysuria, frequency and hematuria.   Musculoskeletal: Negative for arthralgias and myalgias.   Neurological: Positive for headaches.       Objective   Vitals:    07/20/20 1347   BP: 124/86   Pulse: 90   Resp: 16   Temp: 96.4 °F (35.8 °C)   SpO2: 96%          07/20/20  1347   Weight: 112 kg (248 lb)    [unfilled]  Body mass index is 42.55 kg/m².      Physical Exam   Constitutional: She appears well-developed and well-nourished. No distress.   HENT:   Head: Normocephalic and atraumatic.   Neck: Carotid bruit is not present.   Cardiovascular: Normal rate, regular rhythm, normal heart sounds and intact distal pulses. Exam reveals no gallop.   No murmur heard.  Pulmonary/Chest: Effort normal and breath sounds normal. No respiratory distress. She has no wheezes. She has no rales.   Abdominal: Soft. Bowel sounds are normal. She exhibits no mass. There is no tenderness. There is no guarding.         Problem List Items Addressed This Visit        Cardiovascular and Mediastinum    Hyperlipidemia    Hypertension - Primary       Digestive    GERD (gastroesophageal reflux disease)    IBS (irritable bowel syndrome)       Other    Anxiety, generalized      Other Visit Diagnoses     Right lower quadrant abdominal pain            Assessment/Plan   In for recheck of htn, lipids, IBS, and Gerd.  Has  diarrhea predominant irritable bowel which is doing pretty well overall.  Takes Imodium when necessary.  She is back on probiotics.  In the past few months she has had some vague right lower quadrant colicky discomfort.  No radiation.  Perhaps getting more frequent now.  Almost daily symptoms.  She is off of dicyclomine.  May need to resume it.  Annual labs today July 2020 including CBC, CMP, lipids, sed rate, CRP, UA.  Lipids q 3 mos. Sertraline dose is back to 150 mg daily.  Depression is better now.  Due for Shingrix.  Remain on Naprosyn 225 mg daily for arthritis she is off of atorvastatin for some reason and will resume it now.  Remains on Zetia.  Annual preventive exam January 2021.  She is fasting today.  She is having trouble with left eye irritation for several months.  She uses antihistamines and eyedrops without benefit.  Will try on some antihistamine eyedrops, Astelin 0.05% 1 drop twice daily as needed.           Dragon disclaimer:   Much of this encounter note is an electronic transcription/translation of spoken language to printed text. The electronic translation of spoken language may permit erroneous, or at times, nonsensical words or phrases to be inadvertently transcribed; Although I have reviewed the note for such errors, some may still exist.

## 2020-07-21 LAB
ALBUMIN SERPL-MCNC: 4.2 G/DL (ref 3.5–5.2)
ALBUMIN/GLOB SERPL: 1.8 G/DL
ALP SERPL-CCNC: 105 U/L (ref 39–117)
ALT SERPL-CCNC: 30 U/L (ref 1–33)
AST SERPL-CCNC: 28 U/L (ref 1–32)
BASOPHILS # BLD AUTO: 0.06 10*3/MM3 (ref 0–0.2)
BASOPHILS NFR BLD AUTO: 0.7 % (ref 0–1.5)
BILIRUB SERPL-MCNC: 0.8 MG/DL (ref 0–1.2)
BUN SERPL-MCNC: 13 MG/DL (ref 8–23)
BUN/CREAT SERPL: 25 (ref 7–25)
CALCIUM SERPL-MCNC: 9.2 MG/DL (ref 8.6–10.5)
CHLORIDE SERPL-SCNC: 102 MMOL/L (ref 98–107)
CHOLEST SERPL-MCNC: 127 MG/DL (ref 0–200)
CHOLEST/HDLC SERPL: 3.26 {RATIO}
CO2 SERPL-SCNC: 28 MMOL/L (ref 22–29)
CREAT SERPL-MCNC: 0.52 MG/DL (ref 0.57–1)
CRP SERPL-MCNC: 0.68 MG/DL (ref 0–0.5)
EOSINOPHIL # BLD AUTO: 0.23 10*3/MM3 (ref 0–0.4)
EOSINOPHIL NFR BLD AUTO: 2.8 % (ref 0.3–6.2)
ERYTHROCYTE [DISTWIDTH] IN BLOOD BY AUTOMATED COUNT: 12.7 % (ref 12.3–15.4)
ERYTHROCYTE [SEDIMENTATION RATE] IN BLOOD BY WESTERGREN METHOD: 8 MM/HR (ref 0–30)
GLOBULIN SER CALC-MCNC: 2.3 GM/DL
GLUCOSE SERPL-MCNC: 92 MG/DL (ref 65–99)
HCT VFR BLD AUTO: 39.2 % (ref 34–46.6)
HDLC SERPL-MCNC: 39 MG/DL (ref 40–60)
HGB BLD-MCNC: 13.5 G/DL (ref 12–15.9)
IMM GRANULOCYTES # BLD AUTO: 0.02 10*3/MM3 (ref 0–0.05)
IMM GRANULOCYTES NFR BLD AUTO: 0.2 % (ref 0–0.5)
LDLC SERPL CALC-MCNC: 66 MG/DL (ref 0–100)
LYMPHOCYTES # BLD AUTO: 1.66 10*3/MM3 (ref 0.7–3.1)
LYMPHOCYTES NFR BLD AUTO: 20.3 % (ref 19.6–45.3)
MCH RBC QN AUTO: 33.3 PG (ref 26.6–33)
MCHC RBC AUTO-ENTMCNC: 34.4 G/DL (ref 31.5–35.7)
MCV RBC AUTO: 96.8 FL (ref 79–97)
MONOCYTES # BLD AUTO: 0.63 10*3/MM3 (ref 0.1–0.9)
MONOCYTES NFR BLD AUTO: 7.7 % (ref 5–12)
NEUTROPHILS # BLD AUTO: 5.57 10*3/MM3 (ref 1.7–7)
NEUTROPHILS NFR BLD AUTO: 68.3 % (ref 42.7–76)
NRBC BLD AUTO-RTO: 0 /100 WBC (ref 0–0.2)
PLATELET # BLD AUTO: 229 10*3/MM3 (ref 140–450)
POTASSIUM SERPL-SCNC: 3.8 MMOL/L (ref 3.5–5.2)
PROT SERPL-MCNC: 6.5 G/DL (ref 6–8.5)
RBC # BLD AUTO: 4.05 10*6/MM3 (ref 3.77–5.28)
SODIUM SERPL-SCNC: 141 MMOL/L (ref 136–145)
TRIGL SERPL-MCNC: 109 MG/DL (ref 0–150)
VLDLC SERPL CALC-MCNC: 21.8 MG/DL (ref 5–40)
WBC # BLD AUTO: 8.17 10*3/MM3 (ref 3.4–10.8)

## 2020-08-31 RX ORDER — VALACYCLOVIR HYDROCHLORIDE 500 MG/1
500 TABLET, FILM COATED ORAL DAILY
Qty: 90 TABLET | Refills: 0 | Status: SHIPPED | OUTPATIENT
Start: 2020-08-31 | End: 2020-11-23

## 2020-10-19 ENCOUNTER — TELEMEDICINE (OUTPATIENT)
Dept: INTERNAL MEDICINE | Facility: CLINIC | Age: 60
End: 2020-10-19

## 2020-10-19 DIAGNOSIS — I10 ESSENTIAL HYPERTENSION: Primary | ICD-10-CM

## 2020-10-19 DIAGNOSIS — K58.0 IRRITABLE BOWEL SYNDROME WITH DIARRHEA: ICD-10-CM

## 2020-10-19 DIAGNOSIS — E78.5 HYPERLIPIDEMIA, UNSPECIFIED HYPERLIPIDEMIA TYPE: ICD-10-CM

## 2020-10-19 DIAGNOSIS — K21.9 GASTROESOPHAGEAL REFLUX DISEASE, UNSPECIFIED WHETHER ESOPHAGITIS PRESENT: ICD-10-CM

## 2020-10-19 PROCEDURE — 99214 OFFICE O/P EST MOD 30 MIN: CPT | Performed by: INTERNAL MEDICINE

## 2020-10-19 NOTE — PROGRESS NOTES
Subjective   Ann-Marie Eaton is a 60 y.o. female.     Chief Complaint   Patient presents with   • Hypertension   • Hyperlipidemia   • Heartburn   • Anxiety         Abdominal Pain  This is a recurrent problem. The current episode started more than 1 month ago. The onset quality is gradual. The problem occurs daily. The most recent episode lasted 2 hours. The problem has been gradually worsening. The pain is located in the RLQ. The pain is at a severity of 4/10. The quality of the pain is cramping and dull. The abdominal pain does not radiate. Associated symptoms include diarrhea. Pertinent negatives include no anorexia, arthralgias, belching, constipation, dysuria, fever, flatus, frequency, headaches, hematochezia, hematuria, melena, myalgias, nausea or vomiting. The pain is aggravated by bowel movement. The pain is relieved by bowel movements and passing flatus. Her past medical history is significant for GERD and irritable bowel syndrome.   Hypertension  This is a chronic problem. The current episode started more than 1 year ago. The problem is unchanged. The problem is controlled. Pertinent negatives include no headaches, orthopnea, palpitations, peripheral edema or shortness of breath. There are no associated agents to hypertension. Risk factors for coronary artery disease include dyslipidemia, post-menopausal state, stress, sedentary lifestyle and smoking/tobacco exposure. Current antihypertension treatment includes ACE inhibitors and calcium channel blockers. The current treatment provides significant improvement. There are no compliance problems.  There is no history of angina, kidney disease, CAD/MI, CVA or heart failure.   Hyperlipidemia  This is a chronic problem. The current episode started more than 1 year ago. The problem is controlled. Recent lipid tests were reviewed and are normal. Pertinent negatives include no myalgias or shortness of breath. Current antihyperlipidemic treatment includes statins. The  current treatment provides significant improvement of lipids. There are no compliance problems.  Risk factors for coronary artery disease include dyslipidemia, obesity, stress, post-menopausal and a sedentary lifestyle.   Irritable Bowel Syndrome  This is a chronic problem. Associated symptoms include abdominal pain. Pertinent negatives include no anorexia, arthralgias, chills, coughing, fatigue, fever, headaches, myalgias, nausea or vomiting.   Heartburn  She complains of abdominal pain. She reports no belching, no coughing, no nausea or no wheezing. This is a chronic problem. The current episode started more than 1 year ago. The problem occurs occasionally. The problem has been unchanged. Pertinent negatives include no fatigue or melena. She has tried a histamine-2 antagonist for the symptoms. The treatment provided significant relief.        The following portions of the patient's history were reviewed and updated as appropriate: allergies, current medications, past social history and problem list.    No outpatient medications have been marked as taking for the 10/19/20 encounter (Telemedicine) with John Cowan MD.       Review of Systems   Constitutional: Negative for chills, fatigue and fever.   Respiratory: Negative for cough, shortness of breath and wheezing.    Cardiovascular: Negative for palpitations, orthopnea and leg swelling.   Gastrointestinal: Positive for abdominal pain and diarrhea. Negative for anal bleeding, anorexia, constipation, flatus, hematochezia, melena, nausea and vomiting.   Genitourinary: Negative for dysuria, frequency and hematuria.   Musculoskeletal: Negative for arthralgias and myalgias.   Neurological: Negative for headaches.       Objective   There were no vitals filed for this visit.   There were no vitals filed for this visit. [unfilled]  There is no height or weight on file to calculate BMI.      Physical Exam   Pulmonary/Chest: Effort normal.   Abdominal: Normal  appearance.   Neurological: She is alert.   Psychiatric: Her behavior is normal. Mood, judgment and thought content normal.         Problems Addressed this Visit        Cardiovascular and Mediastinum    Hyperlipidemia    Hypertension - Primary       Digestive    GERD (gastroesophageal reflux disease)    IBS (irritable bowel syndrome)      Diagnoses       Codes Comments    Essential hypertension    -  Primary ICD-10-CM: I10  ICD-9-CM: 401.9     Hyperlipidemia, unspecified hyperlipidemia type     ICD-10-CM: E78.5  ICD-9-CM: 272.4     Irritable bowel syndrome with diarrhea     ICD-10-CM: K58.0  ICD-9-CM: 564.1     Gastroesophageal reflux disease, unspecified whether esophagitis present     ICD-10-CM: K21.9  ICD-9-CM: 530.81         Assessment/Plan   Video visit today due to Covid pandemic.  In for recheck of htn, lipids, IBS, and Gerd.  Has diarrhea predominant irritable bowel which is doing pretty well overall.  Takes Imodium when necessary.  She is back on probiotics.   Annual labs July 2020 including CBC, CMP, lipids, sed rate, CRP, UA.  Lipids q 3 mos. Sertraline dose is back to 150 mg daily.  Depression is better now.  Remains on Naprosyn 225 mg daily for arthritis she is off of atorvastatin for some reason and will resume it now.  Remains on Zetia.  Annual preventive exam January 2021.  Due for annual mammogram.  Spent 18 minutes with patient on visit today.         Dragon disclaimer:   Much of this encounter note is an electronic transcription/translation of spoken language to printed text. The electronic translation of spoken language may permit erroneous, or at times, nonsensical words or phrases to be inadvertently transcribed; Although I have reviewed the note for such errors, some may still exist.

## 2020-11-10 RX ORDER — SERTRALINE HYDROCHLORIDE 100 MG/1
TABLET, FILM COATED ORAL
Qty: 135 TABLET | Refills: 1 | Status: SHIPPED | OUTPATIENT
Start: 2020-11-10 | End: 2021-03-23 | Stop reason: DRUGHIGH

## 2020-11-10 RX ORDER — ERGOCALCIFEROL 1.25 MG/1
CAPSULE ORAL
Qty: 13 CAPSULE | Refills: 1 | Status: SHIPPED | OUTPATIENT
Start: 2020-11-10 | End: 2021-05-17

## 2020-11-10 RX ORDER — EZETIMIBE 10 MG/1
10 TABLET ORAL DAILY
Qty: 90 TABLET | Refills: 1 | Status: SHIPPED | OUTPATIENT
Start: 2020-11-10 | End: 2021-05-17

## 2020-11-10 RX ORDER — LISINOPRIL 10 MG/1
10 TABLET ORAL DAILY
Qty: 90 TABLET | Refills: 1 | Status: SHIPPED | OUTPATIENT
Start: 2020-11-10 | End: 2021-05-17

## 2020-11-11 ENCOUNTER — OFFICE VISIT (OUTPATIENT)
Dept: INTERNAL MEDICINE | Facility: CLINIC | Age: 60
End: 2020-11-11

## 2020-11-11 DIAGNOSIS — E78.5 HYPERLIPIDEMIA, UNSPECIFIED HYPERLIPIDEMIA TYPE: Primary | ICD-10-CM

## 2020-11-11 LAB
CHOLEST SERPL-MCNC: 126 MG/DL (ref 0–200)
CHOLEST/HDLC SERPL: 3.07 {RATIO}
HDLC SERPL-MCNC: 41 MG/DL (ref 40–60)
LDLC SERPL CALC-MCNC: 61 MG/DL (ref 0–100)
TRIGL SERPL-MCNC: 134 MG/DL (ref 0–150)
VLDLC SERPL CALC-MCNC: 24 MG/DL (ref 5–40)

## 2020-11-23 RX ORDER — VALACYCLOVIR HYDROCHLORIDE 500 MG/1
500 TABLET, FILM COATED ORAL DAILY
Qty: 90 TABLET | Refills: 0 | Status: SHIPPED | OUTPATIENT
Start: 2020-11-23 | End: 2021-02-15

## 2020-11-23 RX ORDER — HYDROCHLOROTHIAZIDE 12.5 MG/1
12.5 CAPSULE, GELATIN COATED ORAL DAILY
Qty: 90 CAPSULE | Refills: 1 | Status: SHIPPED | OUTPATIENT
Start: 2020-11-23 | End: 2021-05-17

## 2020-11-23 RX ORDER — DICYCLOMINE HYDROCHLORIDE 10 MG/1
CAPSULE ORAL
Qty: 60 CAPSULE | Refills: 3 | Status: SHIPPED | OUTPATIENT
Start: 2020-11-23 | End: 2021-01-19 | Stop reason: SDUPTHER

## 2021-01-20 RX ORDER — DICYCLOMINE HYDROCHLORIDE 10 MG/1
10 CAPSULE ORAL
Qty: 60 CAPSULE | Refills: 3 | Status: SHIPPED | OUTPATIENT
Start: 2021-01-20 | End: 2021-04-12

## 2021-02-15 RX ORDER — VALACYCLOVIR HYDROCHLORIDE 500 MG/1
500 TABLET, FILM COATED ORAL DAILY
Qty: 90 TABLET | Refills: 1 | Status: SHIPPED | OUTPATIENT
Start: 2021-02-15 | End: 2021-08-11

## 2021-03-16 ENCOUNTER — BULK ORDERING (OUTPATIENT)
Dept: CASE MANAGEMENT | Facility: OTHER | Age: 61
End: 2021-03-16

## 2021-03-16 DIAGNOSIS — Z12.31 SCREENING MAMMOGRAM, ENCOUNTER FOR: Primary | ICD-10-CM

## 2021-03-16 DIAGNOSIS — Z23 IMMUNIZATION DUE: ICD-10-CM

## 2021-03-23 ENCOUNTER — OFFICE VISIT (OUTPATIENT)
Dept: INTERNAL MEDICINE | Facility: CLINIC | Age: 61
End: 2021-03-23

## 2021-03-23 VITALS
WEIGHT: 251 LBS | HEART RATE: 71 BPM | HEIGHT: 62 IN | TEMPERATURE: 96.8 F | DIASTOLIC BLOOD PRESSURE: 80 MMHG | SYSTOLIC BLOOD PRESSURE: 122 MMHG | RESPIRATION RATE: 16 BRPM | BODY MASS INDEX: 46.19 KG/M2 | OXYGEN SATURATION: 95 %

## 2021-03-23 DIAGNOSIS — E78.5 HYPERLIPIDEMIA, UNSPECIFIED HYPERLIPIDEMIA TYPE: Chronic | ICD-10-CM

## 2021-03-23 DIAGNOSIS — I10 ESSENTIAL HYPERTENSION: Chronic | ICD-10-CM

## 2021-03-23 DIAGNOSIS — Z00.00 ENCOUNTER FOR PREVENTIVE HEALTH EXAMINATION: Primary | ICD-10-CM

## 2021-03-23 PROBLEM — D72.829 LEUKOCYTOSIS: Status: RESOLVED | Noted: 2018-05-28 | Resolved: 2021-03-23

## 2021-03-23 PROCEDURE — 99396 PREV VISIT EST AGE 40-64: CPT | Performed by: INTERNAL MEDICINE

## 2021-03-23 RX ORDER — MULTIPLE VITAMINS W/ MINERALS TAB 9MG-400MCG
1 TAB ORAL DAILY
COMMUNITY

## 2021-03-23 RX ORDER — SERTRALINE HYDROCHLORIDE 100 MG/1
200 TABLET, FILM COATED ORAL DAILY
COMMUNITY
End: 2021-05-17

## 2021-03-23 RX ORDER — FAMOTIDINE 20 MG/1
20 TABLET, FILM COATED ORAL DAILY
COMMUNITY
End: 2022-05-31

## 2021-03-23 NOTE — PROGRESS NOTES
Subjective   Ann-Marie Eaton is a 61 y.o. female.     Chief Complaint   Patient presents with   • Annual Exam         In for annual preventative exam.  Sleeps 6-7 hours per night.  No exercise.  Energy is OK.  Diet could be better.       The following portions of the patient's history were reviewed and updated as appropriate: allergies, current medications, past social history and problem list.    HISTORY  Outpatient Medications Marked as Taking for the 3/23/21 encounter (Office Visit) with John Cowan MD   Medication Sig Dispense Refill   • atorvastatin (LIPITOR) 20 MG tablet TAKE 1 TABLET BY MOUTH DAILY 90 tablet 1   • dicyclomine (BENTYL) 10 MG capsule Take 1 capsule by mouth 4 (Four) Times a Day Before Meals & at Bedtime. 60 capsule 3   • ezetimibe (ZETIA) 10 MG tablet TAKE 1 TABLET BY MOUTH DAILY 90 tablet 1   • famotidine (PEPCID) 20 MG tablet Take 20 mg by mouth Daily.     • hydroCHLOROthiazide (MICROZIDE) 12.5 MG capsule TAKE 1 CAPSULE BY MOUTH DAILY 90 capsule 1   • lisinopril (PRINIVIL,ZESTRIL) 10 MG tablet TAKE 1 TABLET BY MOUTH DAILY 90 tablet 1   • loperamide (IMODIUM) 2 MG capsule Take 8 mg by mouth As Needed for Diarrhea.     • multivitamin with minerals (Multivitamin Adult) tablet tablet Take 1 tablet by mouth Daily.     • Naproxen (NAPROSYN PO) Take 440 mg by mouth Daily As Needed (PAIN).     • Probiotic Product (PROBIOTIC DAILY PO) Take 1 tablet by mouth Daily. Taking while on antibiotic therapy.      • sertraline (ZOLOFT) 100 MG tablet Take 200 mg by mouth Daily.     • triamcinolone (KENALOG) 0.5 % cream Apply to affected area(s) 2 times a day 30 g 0   • valACYclovir (VALTREX) 500 MG tablet TAKE 1 TABLET BY MOUTH DAILY 90 tablet 1   • vitamin D (ERGOCALCIFEROL) 1.25 MG (93212 UT) capsule capsule TAKE 1 CAPSULE BY MOUTH 1 TIME A WEEK 13 capsule 1   • [DISCONTINUED] omeprazole (priLOSEC) 20 MG capsule Take 20 mg by mouth Daily.     • [DISCONTINUED] sertraline (ZOLOFT) 100 MG tablet TAKE 1 AND 1/2  TABLETS BY MOUTH DAILY (Patient taking differently: Take 200 mg by mouth Daily.) 135 tablet 1     Social History     Socioeconomic History   • Marital status:      Spouse name: Not on file   • Number of children: Not on file   • Years of education: Not on file   • Highest education level: Not on file   Tobacco Use   • Smoking status: Former Smoker     Packs/day: 1.00     Years: 15.00     Pack years: 15.00     Types: Cigarettes     Quit date: 2006     Years since quitting: 15.2   • Smokeless tobacco: Never Used   Substance and Sexual Activity   • Alcohol use: Yes     Alcohol/week: 0.0 - 1.0 standard drinks     Comment: 1 MONTHLY   • Drug use: No     Family History   Problem Relation Age of Onset   • Lung cancer Mother    • Aneurysm Father    • COPD Father    • No Known Problems Brother    • No Known Problems Daughter    • No Known Problems Sister    • Malig Hyperthermia Neg Hx      Past Medical History:   Diagnosis Date   • Anxiety    • DDD (degenerative disc disease), lumbar    • Eczema    • Fatty liver disease, nonalcoholic    • GERD (gastroesophageal reflux disease)    • History of concussion    • HLD (hyperlipidemia)    • HTN (hypertension)    • IBS (irritable bowel syndrome)    • SHANNAN (obstructive sleep apnea)     NO MACHINE   • Osteoarthritis of right knee    • Urinary incontinence in female     WEARS PADS   • Urolithiasis    • Vitamin D deficiency      Past Surgical History:   Procedure Laterality Date   • APPENDECTOMY     • CHOLECYSTECTOMY     • CYSTOSCOPY W/ LASER LITHOTRIPSY  04/2014   • EXTRACORPOREAL SHOCK WAVE LITHOTRIPSY (ESWL)      STENT  X2   • [place] TOTAL KNEE ARTHROPLASTY Left 4/18/2016    Procedure: LT TOTAL KNEE ARTHROPLASTY;  Surgeon: Cam Ryan MD;  Location: [facility] OR;  Service: Orthopedics   • TONSILLECTOMY     • TOTAL KNEE ARTHROPLASTY Right 8/12/2019    Procedure: RIGHT TOTAL KNEE ARTHROPLASTY;  Surgeon: Cam Ryan MD;  Location: [facility] OR;  Service: Orthopedics    • URETEROTOMY  03/2014    R Stent- stone       Review of Systems   Constitutional: Negative for appetite change, chills, diaphoresis, fatigue, fever and unexpected weight change.   HENT: Negative for congestion, ear pain, hearing loss, nosebleeds, postnasal drip, rhinorrhea, sinus pressure and trouble swallowing.    Eyes: Negative for pain, itching and visual disturbance.   Respiratory: Negative for cough, chest tightness, shortness of breath and wheezing.    Cardiovascular: Negative for chest pain, palpitations and leg swelling.   Gastrointestinal: Positive for abdominal pain (GERD) and diarrhea. Negative for anal bleeding, constipation, nausea ( spells), rectal pain and vomiting.   Endocrine: Negative for cold intolerance, heat intolerance and polyuria.   Genitourinary: Negative for difficulty urinating, dysuria, flank pain, frequency, hematuria and urgency.   Musculoskeletal: Negative for arthralgias (right knee), back pain and myalgias.   Skin: Negative for rash.   Allergic/Immunologic: Positive for environmental allergies.   Neurological: Negative for dizziness, syncope, speech difficulty, weakness, light-headedness, numbness and headaches.   Hematological: Does not bruise/bleed easily.   Psychiatric/Behavioral: Positive for dysphoric mood. Negative for agitation, confusion and sleep disturbance. The patient is not nervous/anxious.        Objective   Vitals:    03/23/21 1030   BP: 122/80   Pulse: 71   Resp: 16   Temp: 96.8 °F (36 °C)   SpO2: 95%          03/23/21  1030   Weight: 114 kg (251 lb)    [unfilled]  Body mass index is 45.9 kg/m².      Physical Exam   Constitutional: She is oriented to person, place, and time. She appears well-developed.   HENT:   Head: Normocephalic and atraumatic.   Right Ear: External ear normal.   Left Ear: External ear normal.   Nose: Nose normal.   Eyes: Pupils are equal, round, and reactive to light. Conjunctivae are normal.   Neck: No JVD present. No thyromegaly present.    Cardiovascular: Normal rate, regular rhythm and normal heart sounds. Exam reveals no gallop.   No murmur heard.  Pulmonary/Chest: Effort normal and breath sounds normal. No respiratory distress. She has no wheezes. She has no rales.   Abdominal: Soft. Bowel sounds are normal. She exhibits no distension and no mass. There is no abdominal tenderness. There is no guarding. No hernia.   Musculoskeletal: Normal range of motion.   Lymphadenopathy:     She has no cervical adenopathy.   Neurological: She is alert and oriented to person, place, and time. She displays normal reflexes. No cranial nerve deficit. Coordination normal.   Skin: Skin is warm and dry.   Psychiatric: Her behavior is normal. Judgment and thought content normal.   Nursing note and vitals reviewed.        Problems Addressed this Visit        Cardiac and Vasculature    Hypertension (Chronic)    Hyperlipidemia (Chronic)      Other Visit Diagnoses     Encounter for preventive health examination    -  Primary      Diagnoses       Codes Comments    Encounter for preventive health examination    -  Primary ICD-10-CM: Z00.00  ICD-9-CM: V70.0     Essential hypertension     ICD-10-CM: I10  ICD-9-CM: 401.9     Hyperlipidemia, unspecified hyperlipidemia type     ICD-10-CM: E78.5  ICD-9-CM: 272.4         Assessment/Plan   In for annual preventative exam and recheck of htn, lipids, IBS, and Gerd.  Has diarrhea predominant irritable bowel which has been getting worse over the last 6 weeks.  Takes Imodium when necessary.  For now she is improved on probiotics.  Using her dicyclomine regularly now.  Also boosted her sertraline dose to 200 mg recently.  Annual labs July 2020.  Lipids q 3 mos.  Depression is better now.  She is fasting today.      Prevention counseling was performed today. The counseling performed was routine health maintenance topics.    The above information was reviewed again today 03/23/21.  It continues to be accurate as reflected above and is  unchanged.  History, physical and review of systems all reviewed and are unchanged.  Medications were reviewed today and continue the current dosing.    PPE today includes face mask and eye shield.           Dragon disclaimer:   Much of this encounter note is an electronic transcription/translation of spoken language to printed text. The electronic translation of spoken language may permit erroneous, or at times, nonsensical words or phrases to be inadvertently transcribed; Although I have reviewed the note for such errors, some may still exist.

## 2021-03-24 LAB
CHOLEST SERPL-MCNC: 203 MG/DL (ref 0–200)
CHOLEST/HDLC SERPL: 4.61 {RATIO}
HDLC SERPL-MCNC: 44 MG/DL (ref 40–60)
LDLC SERPL CALC-MCNC: 124 MG/DL (ref 0–100)
TRIGL SERPL-MCNC: 198 MG/DL (ref 0–150)
VLDLC SERPL CALC-MCNC: 35 MG/DL (ref 5–40)

## 2021-03-30 ENCOUNTER — HOSPITAL ENCOUNTER (OUTPATIENT)
Dept: MAMMOGRAPHY | Facility: HOSPITAL | Age: 61
Discharge: HOME | End: 2021-03-30
Admitting: INTERNAL MEDICINE

## 2021-03-30 PROCEDURE — 77067 SCR MAMMO BI INCL CAD: CPT

## 2021-03-30 PROCEDURE — 77063 BREAST TOMOSYNTHESIS BI: CPT

## 2021-04-12 RX ORDER — DICYCLOMINE HYDROCHLORIDE 10 MG/1
CAPSULE ORAL
Qty: 60 CAPSULE | Refills: 3 | Status: SHIPPED | OUTPATIENT
Start: 2021-04-12 | End: 2021-12-06

## 2021-05-17 RX ORDER — SERTRALINE HYDROCHLORIDE 100 MG/1
TABLET, FILM COATED ORAL
Qty: 135 TABLET | Refills: 1 | Status: SHIPPED | OUTPATIENT
Start: 2021-05-17 | End: 2021-11-08

## 2021-05-17 RX ORDER — EZETIMIBE 10 MG/1
10 TABLET ORAL DAILY
Qty: 90 TABLET | Refills: 1 | Status: SHIPPED | OUTPATIENT
Start: 2021-05-17 | End: 2021-11-08

## 2021-05-17 RX ORDER — ERGOCALCIFEROL 1.25 MG/1
CAPSULE ORAL
Qty: 13 CAPSULE | Refills: 1 | Status: SHIPPED | OUTPATIENT
Start: 2021-05-17 | End: 2021-11-08

## 2021-05-17 RX ORDER — HYDROCHLOROTHIAZIDE 12.5 MG/1
12.5 CAPSULE, GELATIN COATED ORAL DAILY
Qty: 90 CAPSULE | Refills: 1 | Status: SHIPPED | OUTPATIENT
Start: 2021-05-17 | End: 2021-11-08

## 2021-05-17 RX ORDER — LISINOPRIL 10 MG/1
10 TABLET ORAL DAILY
Qty: 90 TABLET | Refills: 1 | Status: SHIPPED | OUTPATIENT
Start: 2021-05-17 | End: 2021-11-08

## 2021-07-11 ENCOUNTER — APPOINTMENT (OUTPATIENT)
Dept: CT IMAGING | Facility: HOSPITAL | Age: 61
End: 2021-07-11

## 2021-07-11 ENCOUNTER — HOSPITAL ENCOUNTER (EMERGENCY)
Facility: HOSPITAL | Age: 61
Discharge: HOME | End: 2021-07-11
Attending: EMERGENCY MEDICINE | Admitting: EMERGENCY MEDICINE

## 2021-07-11 VITALS
HEART RATE: 80 BPM | TEMPERATURE: 97.9 F | RESPIRATION RATE: 18 BRPM | SYSTOLIC BLOOD PRESSURE: 136 MMHG | OXYGEN SATURATION: 91 % | DIASTOLIC BLOOD PRESSURE: 50 MMHG

## 2021-07-11 DIAGNOSIS — N13.2 HYDRONEPHROSIS WITH URINARY OBSTRUCTION DUE TO URETERAL CALCULUS: ICD-10-CM

## 2021-07-11 DIAGNOSIS — N20.1 URETEROLITHIASIS: Primary | ICD-10-CM

## 2021-07-11 DIAGNOSIS — N23 RENAL COLIC ON RIGHT SIDE: ICD-10-CM

## 2021-07-11 LAB
ALBUMIN SERPL-MCNC: 4.3 G/DL (ref 3.5–5.2)
ALBUMIN/GLOB SERPL: 1.7 G/DL
ALP SERPL-CCNC: 99 U/L (ref 39–117)
ALT SERPL W P-5'-P-CCNC: 46 U/L (ref 1–33)
ANION GAP SERPL CALCULATED.3IONS-SCNC: 9.4 MMOL/L (ref 5–15)
AST SERPL-CCNC: 42 U/L (ref 1–32)
BACTERIA UR QL AUTO: ABNORMAL /HPF
BASOPHILS # BLD AUTO: 0.05 10*3/MM3 (ref 0–0.2)
BASOPHILS NFR BLD AUTO: 0.6 % (ref 0–1.5)
BILIRUB SERPL-MCNC: 0.7 MG/DL (ref 0–1.2)
BILIRUB UR QL STRIP: NEGATIVE
BUN SERPL-MCNC: 14 MG/DL (ref 8–23)
BUN/CREAT SERPL: 20 (ref 7–25)
CALCIUM SPEC-SCNC: 9.2 MG/DL (ref 8.6–10.5)
CHLORIDE SERPL-SCNC: 98 MMOL/L (ref 98–107)
CLARITY UR: CLEAR
CO2 SERPL-SCNC: 28.6 MMOL/L (ref 22–29)
COLOR UR: YELLOW
CREAT SERPL-MCNC: 0.7 MG/DL (ref 0.57–1)
DEPRECATED RDW RBC AUTO: 47.4 FL (ref 37–54)
EOSINOPHIL # BLD AUTO: 0.51 10*3/MM3 (ref 0–0.4)
EOSINOPHIL NFR BLD AUTO: 6 % (ref 0.3–6.2)
ERYTHROCYTE [DISTWIDTH] IN BLOOD BY AUTOMATED COUNT: 12.5 % (ref 12.3–15.4)
GFR SERPL CREATININE-BSD FRML MDRD: 85 ML/MIN/1.73
GLOBULIN UR ELPH-MCNC: 2.5 GM/DL
GLUCOSE SERPL-MCNC: 117 MG/DL (ref 65–99)
GLUCOSE UR STRIP-MCNC: NEGATIVE MG/DL
HCT VFR BLD AUTO: 45.1 % (ref 34–46.6)
HGB BLD-MCNC: 14.9 G/DL (ref 12–15.9)
HGB UR QL STRIP.AUTO: ABNORMAL
HYALINE CASTS UR QL AUTO: ABNORMAL /LPF
IMM GRANULOCYTES # BLD AUTO: 0.03 10*3/MM3 (ref 0–0.05)
IMM GRANULOCYTES NFR BLD AUTO: 0.4 % (ref 0–0.5)
KETONES UR QL STRIP: NEGATIVE
LEUKOCYTE ESTERASE UR QL STRIP.AUTO: ABNORMAL
LIPASE SERPL-CCNC: 17 U/L (ref 13–60)
LYMPHOCYTES # BLD AUTO: 1.83 10*3/MM3 (ref 0.7–3.1)
LYMPHOCYTES NFR BLD AUTO: 21.4 % (ref 19.6–45.3)
MCH RBC QN AUTO: 33.8 PG (ref 26.6–33)
MCHC RBC AUTO-ENTMCNC: 33 G/DL (ref 31.5–35.7)
MCV RBC AUTO: 102.3 FL (ref 79–97)
MONOCYTES # BLD AUTO: 0.56 10*3/MM3 (ref 0.1–0.9)
MONOCYTES NFR BLD AUTO: 6.6 % (ref 5–12)
NEUTROPHILS NFR BLD AUTO: 5.56 10*3/MM3 (ref 1.7–7)
NEUTROPHILS NFR BLD AUTO: 65 % (ref 42.7–76)
NITRITE UR QL STRIP: NEGATIVE
NRBC BLD AUTO-RTO: 0 /100 WBC (ref 0–0.2)
PH UR STRIP.AUTO: 5.5 [PH] (ref 5–8)
PLATELET # BLD AUTO: 227 10*3/MM3 (ref 140–450)
PMV BLD AUTO: 9.9 FL (ref 6–12)
POTASSIUM SERPL-SCNC: 3.5 MMOL/L (ref 3.5–5.2)
PROT SERPL-MCNC: 6.8 G/DL (ref 6–8.5)
PROT UR QL STRIP: NEGATIVE
RBC # BLD AUTO: 4.41 10*6/MM3 (ref 3.77–5.28)
RBC # UR: ABNORMAL /HPF
REF LAB TEST METHOD: ABNORMAL
SODIUM SERPL-SCNC: 136 MMOL/L (ref 136–145)
SP GR UR STRIP: 1.02 (ref 1–1.03)
SQUAMOUS #/AREA URNS HPF: ABNORMAL /HPF
UROBILINOGEN UR QL STRIP: ABNORMAL
WBC # BLD AUTO: 8.54 10*3/MM3 (ref 3.4–10.8)
WBC UR QL AUTO: ABNORMAL /HPF

## 2021-07-11 PROCEDURE — 80053 COMPREHEN METABOLIC PANEL: CPT | Performed by: EMERGENCY MEDICINE

## 2021-07-11 PROCEDURE — 96375 TX/PRO/DX INJ NEW DRUG ADDON: CPT

## 2021-07-11 PROCEDURE — 83690 ASSAY OF LIPASE: CPT | Performed by: EMERGENCY MEDICINE

## 2021-07-11 PROCEDURE — 25010000002 HYDROMORPHONE PER 4 MG: Performed by: EMERGENCY MEDICINE

## 2021-07-11 PROCEDURE — 81001 URINALYSIS AUTO W/SCOPE: CPT | Performed by: EMERGENCY MEDICINE

## 2021-07-11 PROCEDURE — 99283 EMERGENCY DEPT VISIT LOW MDM: CPT

## 2021-07-11 PROCEDURE — 25010000002 KETOROLAC TROMETHAMINE PER 15 MG: Performed by: EMERGENCY MEDICINE

## 2021-07-11 PROCEDURE — 74176 CT ABD & PELVIS W/O CONTRAST: CPT

## 2021-07-11 PROCEDURE — 25010000002 ONDANSETRON PER 1 MG: Performed by: EMERGENCY MEDICINE

## 2021-07-11 PROCEDURE — 85025 COMPLETE CBC W/AUTO DIFF WBC: CPT | Performed by: EMERGENCY MEDICINE

## 2021-07-11 PROCEDURE — 96374 THER/PROPH/DIAG INJ IV PUSH: CPT

## 2021-07-11 RX ORDER — KETOROLAC TROMETHAMINE 15 MG/ML
10 INJECTION, SOLUTION INTRAMUSCULAR; INTRAVENOUS ONCE
Status: COMPLETED | OUTPATIENT
Start: 2021-07-11 | End: 2021-07-11

## 2021-07-11 RX ORDER — HYDROCODONE BITARTRATE AND ACETAMINOPHEN 5; 325 MG/1; MG/1
1 TABLET ORAL EVERY 8 HOURS PRN
Qty: 12 TABLET | Refills: 0 | Status: SHIPPED | OUTPATIENT
Start: 2021-07-11 | End: 2022-05-31

## 2021-07-11 RX ORDER — ONDANSETRON 2 MG/ML
4 INJECTION INTRAMUSCULAR; INTRAVENOUS ONCE
Status: COMPLETED | OUTPATIENT
Start: 2021-07-11 | End: 2021-07-11

## 2021-07-11 RX ORDER — ONDANSETRON 4 MG/1
4 TABLET, FILM COATED ORAL EVERY 6 HOURS PRN
Qty: 12 TABLET | Refills: 0 | Status: SHIPPED | OUTPATIENT
Start: 2021-07-11 | End: 2022-05-31

## 2021-07-11 RX ORDER — SODIUM CHLORIDE 0.9 % (FLUSH) 0.9 %
10 SYRINGE (ML) INJECTION AS NEEDED
Status: DISCONTINUED | OUTPATIENT
Start: 2021-07-11 | End: 2021-07-11 | Stop reason: HOSPADM

## 2021-07-11 RX ORDER — HYDROMORPHONE HYDROCHLORIDE 1 MG/ML
0.5 INJECTION, SOLUTION INTRAMUSCULAR; INTRAVENOUS; SUBCUTANEOUS ONCE
Status: COMPLETED | OUTPATIENT
Start: 2021-07-11 | End: 2021-07-11

## 2021-07-11 RX ADMIN — SODIUM CHLORIDE, POTASSIUM CHLORIDE, SODIUM LACTATE AND CALCIUM CHLORIDE 1000 ML: 600; 310; 30; 20 INJECTION, SOLUTION INTRAVENOUS at 08:45

## 2021-07-11 RX ADMIN — KETOROLAC TROMETHAMINE 10 MG: 15 INJECTION, SOLUTION INTRAMUSCULAR; INTRAVENOUS at 08:47

## 2021-07-11 RX ADMIN — HYDROMORPHONE HYDROCHLORIDE 0.5 MG: 1 INJECTION, SOLUTION INTRAMUSCULAR; INTRAVENOUS; SUBCUTANEOUS at 08:48

## 2021-07-11 RX ADMIN — ONDANSETRON 4 MG: 2 INJECTION INTRAMUSCULAR; INTRAVENOUS at 08:46

## 2021-07-11 NOTE — ED PROVIDER NOTES
EMERGENCY DEPARTMENT ENCOUNTER    Room Number:  13/13  Date of encounter:  7/11/2021  PCP: John Cowan MD  Historian: Patient     I used full protective equipment while examining this patient.  This includes face mask, gloves and protective eyewear.  I washed my hands before entering the room and immediately upon leaving the room.  Patient was wearing a surgical mask.      HPI:  Chief Complaint: Right flank pain  A complete HPI/ROS/PMH/PSH/SH/FH are unobtainable due to: None    Context: Ann-Marie Eaton is a 61 y.o. female who presents to the ED c/o sudden onset of right flank pain at 7 AM this morning.  Pain has gradually worsened since then.  Pain is constant.  It is dull but occasionally sharp.  Does not radiate.  Is currently 8/10.  Nothing makes it better or worse.  Complains of nausea but denies vomiting, chest pain, shortness of breath, fever, dysuria, or hematuria.  States symptoms are similar to when she had a kidney stone several years ago.      PAST MEDICAL HISTORY  Active Ambulatory Problems     Diagnosis Date Noted   • Osteoarthritis, knee 04/18/2016   • Hypertension 07/11/2016   • SHANNAN (obstructive sleep apnea) 07/11/2016   • IBS (irritable bowel syndrome) 07/11/2016   • Fatty liver disease, nonalcoholic 07/11/2016   • Vitamin D deficiency 07/11/2016   • DDD (degenerative disc disease), lumbosacral 07/11/2016   • Hyperlipidemia 07/11/2016   • Kidney stone 07/11/2016   • Depression 09/15/2016   • Anxiety, generalized 10/06/2016   • GERD (gastroesophageal reflux disease) 10/06/2016     Resolved Ambulatory Problems     Diagnosis Date Noted   • Gastric reflux 07/11/2016   • Submandibular space infection 05/27/2018   • Dental will-implantitis 05/27/2018   • Leukocytosis 05/28/2018     Past Medical History:   Diagnosis Date   • Anxiety    • DDD (degenerative disc disease), lumbar    • Eczema    • History of concussion    • HLD (hyperlipidemia)    • HTN (hypertension)    • Osteoarthritis of right knee    •  Urinary incontinence in female    • Urolithiasis          PAST SURGICAL HISTORY  Past Surgical History:   Procedure Laterality Date   • APPENDECTOMY     • CHOLECYSTECTOMY     • CYSTOSCOPY W/ LASER LITHOTRIPSY  04/2014   • EXTRACORPOREAL SHOCK WAVE LITHOTRIPSY (ESWL)      STENT  X2   • WI TOTAL KNEE ARTHROPLASTY Left 4/18/2016    Procedure: LT TOTAL KNEE ARTHROPLASTY;  Surgeon: Cam Ryan MD;  Location: [facility];  Service: Orthopedics   • TONSILLECTOMY     • TOTAL KNEE ARTHROPLASTY Right 8/12/2019    Procedure: RIGHT TOTAL KNEE ARTHROPLASTY;  Surgeon: Cam Ryan MD;  Location: [facility];  Service: Orthopedics   • URETEROTOMY  03/2014    R Stent- stone         FAMILY HISTORY  Family History   Problem Relation Age of Onset   • Lung cancer Mother    • Aneurysm Father    • COPD Father    • No Known Problems Brother    • No Known Problems Daughter    • No Known Problems Sister    • Malig Hyperthermia Neg Hx          SOCIAL HISTORY  Social History     Socioeconomic History   • Marital status:      Spouse name: Not on file   • Number of children: Not on file   • Years of education: Not on file   • Highest education level: Not on file   Tobacco Use   • Smoking status: Former Smoker     Packs/day: 1.00     Years: 15.00     Pack years: 15.00     Types: Cigarettes     Quit date: 2006     Years since quitting: 15.5   • Smokeless tobacco: Never Used   Substance and Sexual Activity   • Alcohol use: Yes     Alcohol/week: 0.0 - 1.0 standard drinks     Comment: 1 MONTHLY   • Drug use: No         ALLERGIES  Percocet [oxycodone-acetaminophen], Darvon [propoxyphene], Grass, Other, Pollen extract, Strawberry extract, Sulfa antibiotics, and Tree extract       REVIEW OF SYSTEMS  Review of Systems      All systems have been reviewed and are negative except as as discussed in the HPI    PHYSICAL EXAM    I have reviewed the triage vital signs and nursing notes.    ED Triage Vitals   Temp Heart Rate Resp BP SpO2    07/11/21 0820 07/11/21 0819 07/11/21 0819 -- 07/11/21 0819   97.9 °F (36.6 °C) 108 18  91 %      Temp src Heart Rate Source Patient Position BP Location FiO2 (%)   -- 07/11/21 0819 -- -- --    Monitor          Physical Exam  GENERAL: Awake, alert, appears uncomfortable  HENT: NCAT, nares patent, moist mucous membranes  NECK: supple  EYES: Extraocular muscles intact, no scleral icterus  CV: regular rhythm, regular rate, equal radial pulses bilaterally  RESPIRATORY: normal effort, clear to auscultation bilaterally  ABDOMEN: soft, nontender; right CVA tenderness  MUSCULOSKELETAL: Extremities are nontender and without obvious deformity.  There is normal range of motion in all extremities.  There is no calf tenderness or pedal edema  NEURO: Strength, sensation, and coordination are grossly intact.  Speech and mentation are unremarkable.  No facial droop.  SKIN: warm, dry, no rash  PSYCH: Normal mood and affect      LAB RESULTS  Recent Results (from the past 24 hour(s))   Comprehensive Metabolic Panel    Collection Time: 07/11/21  8:39 AM    Specimen: Blood   Result Value Ref Range    Glucose 117 (H) 65 - 99 mg/dL    BUN 14 8 - 23 mg/dL    Creatinine 0.70 0.57 - 1.00 mg/dL    Sodium 136 136 - 145 mmol/L    Potassium 3.5 3.5 - 5.2 mmol/L    Chloride 98 98 - 107 mmol/L    CO2 28.6 22.0 - 29.0 mmol/L    Calcium 9.2 8.6 - 10.5 mg/dL    Total Protein 6.8 6.0 - 8.5 g/dL    Albumin 4.30 3.50 - 5.20 g/dL    ALT (SGPT) 46 (H) 1 - 33 U/L    AST (SGOT) 42 (H) 1 - 32 U/L    Alkaline Phosphatase 99 39 - 117 U/L    Total Bilirubin 0.7 0.0 - 1.2 mg/dL    eGFR Non African Amer 85 >60 mL/min/1.73    Globulin 2.5 gm/dL    A/G Ratio 1.7 g/dL    BUN/Creatinine Ratio 20.0 7.0 - 25.0    Anion Gap 9.4 5.0 - 15.0 mmol/L   Lipase    Collection Time: 07/11/21  8:39 AM    Specimen: Blood   Result Value Ref Range    Lipase 17 13 - 60 U/L   Urinalysis With Microscopic If Indicated (No Culture) - Urine, Clean Catch    Collection Time: 07/11/21  8:39  AM    Specimen: Urine, Clean Catch   Result Value Ref Range    Color, UA Yellow Yellow, Straw    Appearance, UA Clear Clear    pH, UA 5.5 5.0 - 8.0    Specific Gravity, UA 1.020 1.005 - 1.030    Glucose, UA Negative Negative    Ketones, UA Negative Negative    Bilirubin, UA Negative Negative    Blood, UA Large (3+) (A) Negative    Protein, UA Negative Negative    Leuk Esterase, UA Small (1+) (A) Negative    Nitrite, UA Negative Negative    Urobilinogen, UA 1.0 E.U./dL 0.2 - 1.0 E.U./dL   CBC Auto Differential    Collection Time: 07/11/21  8:39 AM    Specimen: Blood   Result Value Ref Range    WBC 8.54 3.40 - 10.80 10*3/mm3    RBC 4.41 3.77 - 5.28 10*6/mm3    Hemoglobin 14.9 12.0 - 15.9 g/dL    Hematocrit 45.1 34.0 - 46.6 %    .3 (H) 79.0 - 97.0 fL    MCH 33.8 (H) 26.6 - 33.0 pg    MCHC 33.0 31.5 - 35.7 g/dL    RDW 12.5 12.3 - 15.4 %    RDW-SD 47.4 37.0 - 54.0 fl    MPV 9.9 6.0 - 12.0 fL    Platelets 227 140 - 450 10*3/mm3    Neutrophil % 65.0 42.7 - 76.0 %    Lymphocyte % 21.4 19.6 - 45.3 %    Monocyte % 6.6 5.0 - 12.0 %    Eosinophil % 6.0 0.3 - 6.2 %    Basophil % 0.6 0.0 - 1.5 %    Immature Grans % 0.4 0.0 - 0.5 %    Neutrophils, Absolute 5.56 1.70 - 7.00 10*3/mm3    Lymphocytes, Absolute 1.83 0.70 - 3.10 10*3/mm3    Monocytes, Absolute 0.56 0.10 - 0.90 10*3/mm3    Eosinophils, Absolute 0.51 (H) 0.00 - 0.40 10*3/mm3    Basophils, Absolute 0.05 0.00 - 0.20 10*3/mm3    Immature Grans, Absolute 0.03 0.00 - 0.05 10*3/mm3    nRBC 0.0 0.0 - 0.2 /100 WBC   Urinalysis, Microscopic Only - Urine, Clean Catch    Collection Time: 07/11/21  8:39 AM    Specimen: Urine, Clean Catch   Result Value Ref Range    RBC, UA 31-50 (A) None Seen, 0-2 /HPF    WBC, UA 6-12 (A) None Seen, 0-2 /HPF    Bacteria, UA None Seen None Seen /HPF    Squamous Epithelial Cells, UA 3-6 (A) None Seen, 0-2 /HPF    Hyaline Casts, UA 0-2 None Seen /LPF    Methodology Automated Microscopy        Ordered the above labs and independently reviewed the  results.      RADIOLOGY  CT Abdomen Pelvis Without Contrast    Result Date: 7/11/2021  CT SCAN OF THE ABDOMEN AND PELVIS WITHOUT CONTRAST  HISTORY: Kidney stones. Right flank pain  TECHNIQUE/FINDINGS: The CT scan was performed as an emergency procedure through the abdomen and pelvis without contrast. The following findings are present: 1. There is mild right renal obstruction secondary to a 3 mm stone at the right ureteropelvic junction as seen on image 83. There is also a 6 mm nonobstructing stone in the right kidney. There is a 5 mm nonobstructing stone in the left kidney. 2. There is a calcified granuloma at the left lung base. There is mild diffuse fatty infiltration of the liver. The spleen, pancreas, and both adrenal glands are unremarkable. The gallbladder has been removed. 3. There is no aortic aneurysm or retroperitoneal lymphadenopathy. The large and small bowel loops are normal in caliber. No abnormality is seen in the pelvis.      Radiation dose reduction techniques were utilized, including automated exposure control and exposure modulation based on body size.         I ordered the above noted radiological studies. Reviewed by me and discussed with radiologist.  See dictation for official radiology interpretation.      PROCEDURES  Procedures      MEDICATIONS GIVEN IN ER    Medications   sodium chloride 0.9 % flush 10 mL (has no administration in time range)   lactated ringers bolus 1,000 mL (0 mL Intravenous Stopped 7/11/21 0940)   HYDROmorphone (DILAUDID) injection 0.5 mg (0.5 mg Intravenous Given 7/11/21 0848)   ketorolac (TORADOL) injection 10 mg (10 mg Intravenous Given 7/11/21 0847)   ondansetron (ZOFRAN) injection 4 mg (4 mg Intravenous Given 7/11/21 0846)         PROGRESS, DATA ANALYSIS, CONSULTS, AND MEDICAL DECISION MAKING    All labs have been independently reviewed by me.  All radiology studies have been reviewed by me and discussed with radiologist dictating the report.   EKG's independently  viewed and interpreted by me.  I have reviewed the nurse's notes, vital signs, past medical history, and medication list.  Discussion below represents my analysis of pertinent findings related to patient's condition, differential diagnosis, treatment plan and final disposition.      ED Course as of Jul 11 1033   Sun Jul 11, 2021   0825 Old records reviewed.  Patient was last seen here in the ED in March 2016 for an ear laceration and head injury.  Last admitted here in August 2019 for a right knee replacement.  CT abdomen/pelvis done in November 2017 showed right hydronephrosis with bilateral renal calyceal stones and a stone in the bladder.    [WH]   0951 Results of the CT abdomen/pelvis discussed with Dr. Ramsey.  Images independently viewed by me.  There is a 3 mm stone at the right UPJ with mild hydronephrosis.    [WH]   1012 Test results discussed with the patient.  She is resting comfortably.  States her pain has improved.  Patient will be referred to urology for follow-up.  She will be discharged with prescriptions for Norco and Zofran.  Return precautions were discussed.    [WH]   1033 CT scan showed a 3 mm stone at the right UPJ.  Urine was not infected.  Renal function was normal.  Pain improved with IV Dilaudid and Toradol.    [WH]      ED Course User Index  [WH] Martin Reynolds MD       AS OF 10:33 EDT VITALS:    BP - 180/100  HR - 80  TEMP - 97.9 °F (36.6 °C)  O2 SATS - 91%      DIAGNOSIS  Final diagnoses:   Ureterolithiasis   Renal colic on right side   Hydronephrosis with urinary obstruction due to ureteral calculus         DISPOSITION  Discharge    DISCHARGE    Patient discharged in stable condition.    Reviewed implications of results, diagnosis, meds, responsibility to follow up, warning signs and symptoms of possible worsening, potential complications and reasons to return to ER, including worsening pain, vomiting, fever, trouble urinating, or other concern..    Patient/Family voiced  understanding of above instructions.    Discussed plan for discharge, as there is no emergent indication for admission. Patient referred to primary care provider for BP management due to today's BP. Pt/family is agreeable and understands need for follow up and repeat testing.  Pt is aware that discharge does not mean that nothing is wrong but it indicates no emergency is present that requires admission and they must continue care with follow-up as given below or physician of their choice.     FOLLOW-UP  [facility]  [address] [facility] [address]  [phone]  Call in 1 day  If symptoms persist         Medication List      New Prescriptions    HYDROcodone-acetaminophen 5-325 MG per tablet  Commonly known as: NORCO  Take 1 tablet by mouth Every 8 (Eight) Hours As Needed for Moderate Pain .     ondansetron 4 MG tablet  Commonly known as: ZOFRAN  Take 1 tablet by mouth Every 6 (Six) Hours As Needed for Nausea or Vomiting.           Where to Get Your Medications      These medications were sent to [id number] - [place] - [address] AT [facility] OF [address] - [phone]  - [phone]   [address] [place] [address]    Phone: [phone]   · HYDROcodone-acetaminophen 5-325 MG per tablet  · ondansetron 4 MG tablet           Dictated utilizing Dragon dictation:  Much of this encounter note is an electronic transcription/translation of spoken language to printed text. The electronic translation of spoken language may permit erroneous, or at times, nonsensical words or phrases to be inadvertently transcribed; Although I have reviewed the note for such errors, some may still exist.     Martin Reynolds MD  07/11/21 1030

## 2021-07-11 NOTE — DISCHARGE INSTRUCTIONS
Strain your urine.  Drink plenty of fluids.  Take medications as prescribed.  Return to the emergency department for worsening pain, nausea, vomiting, fever, trouble urinating, or other concern.  Follow-up with [facility] if you do not pass your kidney stone in the next 2 to 3 days.

## 2021-07-30 ENCOUNTER — OFFICE VISIT (OUTPATIENT)
Dept: INTERNAL MEDICINE | Facility: CLINIC | Age: 61
End: 2021-07-30

## 2021-07-30 VITALS
HEIGHT: 62 IN | WEIGHT: 255 LBS | HEART RATE: 96 BPM | OXYGEN SATURATION: 95 % | SYSTOLIC BLOOD PRESSURE: 128 MMHG | DIASTOLIC BLOOD PRESSURE: 84 MMHG | TEMPERATURE: 97.5 F | BODY MASS INDEX: 46.93 KG/M2 | RESPIRATION RATE: 18 BRPM

## 2021-07-30 DIAGNOSIS — K58.0 IRRITABLE BOWEL SYNDROME WITH DIARRHEA: Chronic | ICD-10-CM

## 2021-07-30 DIAGNOSIS — E78.5 HYPERLIPIDEMIA, UNSPECIFIED HYPERLIPIDEMIA TYPE: Chronic | ICD-10-CM

## 2021-07-30 DIAGNOSIS — K21.9 GASTROESOPHAGEAL REFLUX DISEASE, UNSPECIFIED WHETHER ESOPHAGITIS PRESENT: Chronic | ICD-10-CM

## 2021-07-30 DIAGNOSIS — Z79.899 MEDICATION MANAGEMENT: ICD-10-CM

## 2021-07-30 DIAGNOSIS — I10 ESSENTIAL HYPERTENSION: Primary | Chronic | ICD-10-CM

## 2021-07-30 PROCEDURE — 99214 OFFICE O/P EST MOD 30 MIN: CPT | Performed by: INTERNAL MEDICINE

## 2021-07-30 NOTE — PROGRESS NOTES
Subjective   Ann-Marie Eaton is a 61 y.o. female.     Chief Complaint   Patient presents with   • Hyperlipidemia   • Hypertension   • Irritable Bowel Syndrome         Hyperlipidemia  This is a chronic problem. The current episode started more than 1 year ago. The problem is controlled. Recent lipid tests were reviewed and are normal. Pertinent negatives include no shortness of breath. Current antihyperlipidemic treatment includes statins. The current treatment provides significant improvement of lipids. There are no compliance problems.  Risk factors for coronary artery disease include dyslipidemia, obesity, stress, post-menopausal and a sedentary lifestyle.   Hypertension  This is a chronic problem. The current episode started more than 1 year ago. The problem is unchanged. The problem is controlled. Pertinent negatives include no orthopnea, palpitations, peripheral edema or shortness of breath. There are no associated agents to hypertension. Risk factors for coronary artery disease include dyslipidemia, post-menopausal state, stress, sedentary lifestyle and smoking/tobacco exposure. Current antihypertension treatment includes ACE inhibitors and calcium channel blockers. The current treatment provides significant improvement. There are no compliance problems.  There is no history of angina, kidney disease, CAD/MI, CVA or heart failure.   Irritable Bowel Syndrome  This is a chronic problem. Pertinent negatives include no coughing.   Heartburn  She reports no coughing or no wheezing. This is a chronic problem. The current episode started more than 1 year ago. The problem occurs occasionally. The problem has been unchanged. She has tried a histamine-2 antagonist for the symptoms. The treatment provided significant relief.        The following portions of the patient's history were reviewed and updated as appropriate: allergies, current medications, past social history and problem list.    Outpatient Medications Marked as  Taking for the 7/30/21 encounter (Office Visit) with John Cowan MD   Medication Sig Dispense Refill   • atorvastatin (LIPITOR) 20 MG tablet TAKE 1 TABLET BY MOUTH DAILY 90 tablet 1   • dicyclomine (BENTYL) 10 MG capsule TAKE 1 CAPSULE BY MOUTH FOUR TIMES DAILY BEFORE MEALS AND AT BEDTIME. 60 capsule 3   • ezetimibe (ZETIA) 10 MG tablet TAKE 1 TABLET BY MOUTH DAILY 90 tablet 1   • famotidine (PEPCID) 20 MG tablet Take 20 mg by mouth Daily.     • hydroCHLOROthiazide (MICROZIDE) 12.5 MG capsule TAKE 1 CAPSULE BY MOUTH DAILY 90 capsule 1   • HYDROcodone-acetaminophen (NORCO) 5-325 MG per tablet Take 1 tablet by mouth Every 8 (Eight) Hours As Needed for Moderate Pain . 12 tablet 0   • lisinopril (PRINIVIL,ZESTRIL) 10 MG tablet TAKE 1 TABLET BY MOUTH DAILY 90 tablet 1   • loperamide (IMODIUM) 2 MG capsule Take 8 mg by mouth As Needed for Diarrhea.     • multivitamin with minerals (Multivitamin Adult) tablet tablet Take 1 tablet by mouth Daily.     • Naproxen (NAPROSYN PO) Take 440 mg by mouth Daily As Needed (PAIN).     • ondansetron (ZOFRAN) 4 MG tablet Take 1 tablet by mouth Every 6 (Six) Hours As Needed for Nausea or Vomiting. 12 tablet 0   • Probiotic Product (PROBIOTIC DAILY PO) Take 1 tablet by mouth Daily. Taking while on antibiotic therapy.      • sertraline (ZOLOFT) 100 MG tablet TAKE 1 AND 1/2 TABLETS BY MOUTH DAILY 135 tablet 1   • triamcinolone (KENALOG) 0.5 % cream Apply to affected area(s) 2 times a day 30 g 0   • valACYclovir (VALTREX) 500 MG tablet TAKE 1 TABLET BY MOUTH DAILY 90 tablet 1   • vitamin D (ERGOCALCIFEROL) 1.25 MG (28892 UT) capsule capsule TAKE 1 CAPSULE BY MOUTH 1 TIME A WEEK 13 capsule 1       Review of Systems   Respiratory: Negative for cough, shortness of breath and wheezing.    Cardiovascular: Negative for palpitations, orthopnea and leg swelling.   Gastrointestinal: Positive for diarrhea. Negative for constipation.       Objective   Vitals:    07/30/21 1250   BP: 128/84   Pulse:  96   Resp: 18   Temp: 97.5 °F (36.4 °C)   SpO2: 95%          07/30/21  1250   Weight: 116 kg (255 lb)    [unfilled]  Body mass index is 46.64 kg/m².      Physical Exam   Constitutional: She appears well-developed.   Cardiovascular: Normal rate, regular rhythm and normal heart sounds. Exam reveals no gallop.   No murmur heard.  Pulmonary/Chest: Effort normal and breath sounds normal. No respiratory distress. She has no wheezes. She has no rales.   Abdominal: Soft. Normal appearance and bowel sounds are normal. She exhibits no mass. There is no abdominal tenderness. There is no guarding.   Neurological: She is alert.         Problems Addressed this Visit        Cardiac and Vasculature    Hypertension - Primary (Chronic)    Hyperlipidemia (Chronic)       Gastrointestinal Abdominal     IBS (irritable bowel syndrome) (Chronic)    GERD (gastroesophageal reflux disease) (Chronic)      Diagnoses       Codes Comments    Essential hypertension    -  Primary ICD-10-CM: I10  ICD-9-CM: 401.9     Hyperlipidemia, unspecified hyperlipidemia type     ICD-10-CM: E78.5  ICD-9-CM: 272.4     Gastroesophageal reflux disease, unspecified whether esophagitis present     ICD-10-CM: K21.9  ICD-9-CM: 530.81     Irritable bowel syndrome with diarrhea     ICD-10-CM: K58.0  ICD-9-CM: 564.1         Assessment/Plan   In for recheck of htn, lipids, IBS, and Gerd.  Has diarrhea predominant irritable bowel which is doing pretty well overall.  Takes Imodium when necessary.  She is back on probiotics.  Annual labs today July 2021 including CBC, CMP, lipids, UA.  Lipids q 3 mos. recheck LFTs today since it was slightly elevated a few weeks ago.  Sertraline dose is back to 150 mg daily.  Depression is better now.  Remain on Naprosyn 225 mg daily for arthritis she is off of atorvastatin for some reason and will resume it now.  Remains on Zetia.  Annual preventive exam March 2021.  She is fasting today.    The above information was reviewed again today  07/30/21.  It continues to be accurate as reflected above and is unchanged.  History, physical and review of systems all reviewed and are unchanged.  Medications were reviewed today and continue the current dosing.    PPE today includes face mask and eye shield.             Dragon disclaimer:   Much of this encounter note is an electronic transcription/translation of spoken language to printed text. The electronic translation of spoken language may permit erroneous, or at times, nonsensical words or phrases to be inadvertently transcribed; Although I have reviewed the note for such errors, some may still exist.

## 2021-07-31 LAB
ALBUMIN SERPL-MCNC: 4.2 G/DL (ref 3.5–5.2)
ALP SERPL-CCNC: 98 U/L (ref 39–117)
ALT SERPL-CCNC: 39 U/L (ref 1–33)
AST SERPL-CCNC: 36 U/L (ref 1–32)
BILIRUB DIRECT SERPL-MCNC: 0.2 MG/DL (ref 0–0.3)
BILIRUB SERPL-MCNC: 0.7 MG/DL (ref 0–1.2)
CHOLEST SERPL-MCNC: 185 MG/DL (ref 0–200)
CHOLEST/HDLC SERPL: 4.63 {RATIO}
HDLC SERPL-MCNC: 40 MG/DL (ref 40–60)
LDLC SERPL CALC-MCNC: 114 MG/DL (ref 0–100)
TRIGL SERPL-MCNC: 174 MG/DL (ref 0–150)
VLDLC SERPL CALC-MCNC: 31 MG/DL (ref 5–40)

## 2021-08-11 RX ORDER — VALACYCLOVIR HYDROCHLORIDE 500 MG/1
500 TABLET, FILM COATED ORAL DAILY
Qty: 90 TABLET | Refills: 1 | Status: SHIPPED | OUTPATIENT
Start: 2021-08-11 | End: 2022-02-14

## 2021-10-29 ENCOUNTER — OFFICE VISIT (OUTPATIENT)
Dept: INTERNAL MEDICINE | Facility: CLINIC | Age: 61
End: 2021-10-29

## 2021-10-29 VITALS
BODY MASS INDEX: 47.48 KG/M2 | WEIGHT: 258 LBS | SYSTOLIC BLOOD PRESSURE: 114 MMHG | HEART RATE: 84 BPM | OXYGEN SATURATION: 98 % | RESPIRATION RATE: 16 BRPM | DIASTOLIC BLOOD PRESSURE: 70 MMHG | TEMPERATURE: 97.3 F | HEIGHT: 62 IN

## 2021-10-29 DIAGNOSIS — K21.9 GASTROESOPHAGEAL REFLUX DISEASE, UNSPECIFIED WHETHER ESOPHAGITIS PRESENT: Chronic | ICD-10-CM

## 2021-10-29 DIAGNOSIS — K58.0 IRRITABLE BOWEL SYNDROME WITH DIARRHEA: Chronic | ICD-10-CM

## 2021-10-29 DIAGNOSIS — E78.5 HYPERLIPIDEMIA, UNSPECIFIED HYPERLIPIDEMIA TYPE: Chronic | ICD-10-CM

## 2021-10-29 DIAGNOSIS — I10 PRIMARY HYPERTENSION: Primary | Chronic | ICD-10-CM

## 2021-10-29 DIAGNOSIS — Z79.899 MEDICATION MANAGEMENT: ICD-10-CM

## 2021-10-29 PROCEDURE — 99214 OFFICE O/P EST MOD 30 MIN: CPT | Performed by: INTERNAL MEDICINE

## 2021-10-29 NOTE — PROGRESS NOTES
Subjective   Ann-Marie Eaton is a 61 y.o. female.     Chief Complaint   Patient presents with   • Hypertension   • Hyperlipidemia   • Irritable Bowel Syndrome         Hypertension  This is a chronic problem. The current episode started more than 1 year ago. The problem is unchanged. The problem is controlled. Pertinent negatives include no orthopnea, palpitations, peripheral edema or shortness of breath. There are no associated agents to hypertension. Risk factors for coronary artery disease include dyslipidemia, post-menopausal state, stress, sedentary lifestyle and smoking/tobacco exposure. Current antihypertension treatment includes ACE inhibitors and calcium channel blockers.   Hyperlipidemia  This is a chronic problem. The current episode started more than 1 year ago. The problem is controlled. Recent lipid tests were reviewed and are normal. Pertinent negatives include no shortness of breath.   Irritable Bowel Syndrome  This is a chronic problem.   Heartburn  She reports no wheezing. This is a chronic problem. The current episode started more than 1 year ago. The problem occurs occasionally. The problem has been unchanged. She has tried a histamine-2 antagonist for the symptoms. The treatment provided significant relief.        The following portions of the patient's history were reviewed and updated as appropriate: allergies, current medications, past social history and problem list.    Outpatient Medications Marked as Taking for the 10/29/21 encounter (Office Visit) with John Cowan MD   Medication Sig Dispense Refill   • atorvastatin (LIPITOR) 20 MG tablet TAKE 1 TABLET BY MOUTH DAILY 90 tablet 1   • dicyclomine (BENTYL) 10 MG capsule TAKE 1 CAPSULE BY MOUTH FOUR TIMES DAILY BEFORE MEALS AND AT BEDTIME. 60 capsule 3   • ezetimibe (ZETIA) 10 MG tablet TAKE 1 TABLET BY MOUTH DAILY 90 tablet 1   • famotidine (PEPCID) 20 MG tablet Take 20 mg by mouth Daily.     • hydroCHLOROthiazide (MICROZIDE) 12.5 MG capsule  TAKE 1 CAPSULE BY MOUTH DAILY 90 capsule 1   • lisinopril (PRINIVIL,ZESTRIL) 10 MG tablet TAKE 1 TABLET BY MOUTH DAILY 90 tablet 1   • loperamide (IMODIUM) 2 MG capsule Take 8 mg by mouth As Needed for Diarrhea.     • multivitamin with minerals (Multivitamin Adult) tablet tablet Take 1 tablet by mouth Daily.     • Naproxen (NAPROSYN PO) Take 440 mg by mouth Daily As Needed (PAIN).     • Probiotic Product (PROBIOTIC DAILY PO) Take 1 tablet by mouth Daily. Taking while on antibiotic therapy.      • sertraline (ZOLOFT) 100 MG tablet TAKE 1 AND 1/2 TABLETS BY MOUTH DAILY 135 tablet 1   • valACYclovir (VALTREX) 500 MG tablet TAKE 1 TABLET BY MOUTH DAILY 90 tablet 1   • vitamin D (ERGOCALCIFEROL) 1.25 MG (47497 UT) capsule capsule TAKE 1 CAPSULE BY MOUTH 1 TIME A WEEK 13 capsule 1       Review of Systems   Respiratory: Negative for shortness of breath and wheezing.    Cardiovascular: Negative for palpitations, orthopnea and leg swelling.   Gastrointestinal: Negative for constipation and diarrhea.       Objective   Vitals:    10/29/21 1354   BP: 114/70   Pulse: 84   Resp: 16   Temp: 97.3 °F (36.3 °C)   SpO2: 98%          10/29/21  1354   Weight: 117 kg (258 lb)    [unfilled]  Body mass index is 47.19 kg/m².      Physical Exam   Constitutional: She appears well-developed.   Cardiovascular: Normal rate, regular rhythm and normal heart sounds. Exam reveals no gallop.   No murmur heard.  Pulmonary/Chest: Effort normal and breath sounds normal. No respiratory distress. She has no wheezes. She has no rales.   Abdominal: Soft. Normal appearance and bowel sounds are normal. She exhibits no mass. There is no abdominal tenderness. There is no guarding.   Neurological: She is alert.         Problems Addressed this Visit        Cardiac and Vasculature    Hypertension - Primary (Chronic)    Hyperlipidemia (Chronic)       Gastrointestinal Abdominal     IBS (irritable bowel syndrome) (Chronic)    GERD (gastroesophageal reflux  disease) (Chronic)      Diagnoses       Codes Comments    Primary hypertension    -  Primary ICD-10-CM: I10  ICD-9-CM: 401.9     Hyperlipidemia, unspecified hyperlipidemia type     ICD-10-CM: E78.5  ICD-9-CM: 272.4     Irritable bowel syndrome with diarrhea     ICD-10-CM: K58.0  ICD-9-CM: 564.1     Gastroesophageal reflux disease, unspecified whether esophagitis present     ICD-10-CM: K21.9  ICD-9-CM: 530.81         Assessment/Plan   In for recheck of htn, lipids, IBS, and Gerd.  Has diarrhea predominant irritable bowel which is doing pretty well overall.  Takes Imodium when necessary.  She is still on probiotics.  Annual labs July 2021 including CBC, CMP, lipids, UA.  Lipids q 3 mos. Sertraline dose is back to 150 mg daily.  Depression is better now.  Remain on Naprosyn 225 mg daily for arthritis she is off of atorvastatin for some reason and will resume it now.  Remains on Zetia.  Annual preventive exam March 2021.  She is fasting today.    The above information was reviewed again today 10/29/21.  It continues to be accurate as reflected above and is unchanged.  History, physical and review of systems all reviewed and are unchanged.  Medications were reviewed today and continue the current dosing.    PPE today includes face mask and eye shield.         Dragon disclaimer:   Much of this encounter note is an electronic transcription/translation of spoken language to printed text. The electronic translation of spoken language may permit erroneous, or at times, nonsensical words or phrases to be inadvertently transcribed; Although I have reviewed the note for such errors, some may still exist.

## 2021-10-30 LAB
ALBUMIN SERPL-MCNC: 4.2 G/DL (ref 3.8–4.8)
ALP SERPL-CCNC: 92 IU/L (ref 44–121)
ALT SERPL-CCNC: 37 IU/L (ref 0–32)
AST SERPL-CCNC: 35 IU/L (ref 0–40)
BILIRUB DIRECT SERPL-MCNC: 0.2 MG/DL (ref 0–0.4)
BILIRUB SERPL-MCNC: 0.7 MG/DL (ref 0–1.2)
CHOLEST SERPL-MCNC: 181 MG/DL (ref 100–199)
CHOLEST/HDLC SERPL: 4.3 RATIO (ref 0–4.4)
HDLC SERPL-MCNC: 42 MG/DL
LDLC SERPL CALC-MCNC: 110 MG/DL (ref 0–99)
Lab: NORMAL
TRIGL SERPL-MCNC: 163 MG/DL (ref 0–149)
VLDLC SERPL CALC-MCNC: 29 MG/DL (ref 5–40)

## 2021-11-08 RX ORDER — ERGOCALCIFEROL 1.25 MG/1
CAPSULE ORAL
Qty: 13 CAPSULE | Refills: 1 | Status: SHIPPED | OUTPATIENT
Start: 2021-11-08 | End: 2022-05-09

## 2021-11-08 RX ORDER — EZETIMIBE 10 MG/1
10 TABLET ORAL DAILY
Qty: 90 TABLET | Refills: 1 | Status: SHIPPED | OUTPATIENT
Start: 2021-11-08 | End: 2022-05-09

## 2021-11-08 RX ORDER — HYDROCHLOROTHIAZIDE 12.5 MG/1
12.5 CAPSULE, GELATIN COATED ORAL DAILY
Qty: 90 CAPSULE | Refills: 1 | Status: SHIPPED | OUTPATIENT
Start: 2021-11-08 | End: 2022-05-09

## 2021-11-08 RX ORDER — SERTRALINE HYDROCHLORIDE 100 MG/1
TABLET, FILM COATED ORAL
Qty: 135 TABLET | Refills: 1 | Status: SHIPPED | OUTPATIENT
Start: 2021-11-08 | End: 2022-05-09

## 2021-11-08 RX ORDER — LISINOPRIL 10 MG/1
10 TABLET ORAL DAILY
Qty: 90 TABLET | Refills: 1 | Status: SHIPPED | OUTPATIENT
Start: 2021-11-08 | End: 2022-05-09

## 2021-12-06 RX ORDER — DICYCLOMINE HYDROCHLORIDE 10 MG/1
CAPSULE ORAL
Qty: 60 CAPSULE | Refills: 3 | Status: SHIPPED | OUTPATIENT
Start: 2021-12-06 | End: 2022-05-13

## 2022-02-14 RX ORDER — VALACYCLOVIR HYDROCHLORIDE 500 MG/1
500 TABLET, FILM COATED ORAL DAILY
Qty: 90 TABLET | Refills: 1 | Status: SHIPPED | OUTPATIENT
Start: 2022-02-14 | End: 2022-08-05

## 2022-04-19 RX ORDER — DICYCLOMINE HYDROCHLORIDE 10 MG/1
CAPSULE ORAL
Qty: 60 CAPSULE | Refills: 3 | OUTPATIENT
Start: 2022-04-19

## 2022-05-09 RX ORDER — SERTRALINE HYDROCHLORIDE 100 MG/1
TABLET, FILM COATED ORAL
Qty: 135 TABLET | Refills: 1 | Status: SHIPPED | OUTPATIENT
Start: 2022-05-09 | End: 2022-11-03

## 2022-05-09 RX ORDER — LISINOPRIL 10 MG/1
10 TABLET ORAL DAILY
Qty: 90 TABLET | Refills: 1 | Status: SHIPPED | OUTPATIENT
Start: 2022-05-09 | End: 2022-11-03

## 2022-05-09 RX ORDER — HYDROCHLOROTHIAZIDE 12.5 MG/1
12.5 CAPSULE, GELATIN COATED ORAL DAILY
Qty: 90 CAPSULE | Refills: 1 | Status: SHIPPED | OUTPATIENT
Start: 2022-05-09 | End: 2022-11-03

## 2022-05-09 RX ORDER — ERGOCALCIFEROL 1.25 MG/1
CAPSULE ORAL
Qty: 13 CAPSULE | Refills: 1 | Status: SHIPPED | OUTPATIENT
Start: 2022-05-09 | End: 2023-02-07

## 2022-05-09 RX ORDER — EZETIMIBE 10 MG/1
10 TABLET ORAL DAILY
Qty: 90 TABLET | Refills: 1 | Status: SHIPPED | OUTPATIENT
Start: 2022-05-09 | End: 2022-11-03

## 2022-05-13 RX ORDER — DICYCLOMINE HYDROCHLORIDE 10 MG/1
CAPSULE ORAL
Qty: 60 CAPSULE | Refills: 3 | Status: SHIPPED | OUTPATIENT
Start: 2022-05-13 | End: 2022-05-31 | Stop reason: SDUPTHER

## 2022-05-31 ENCOUNTER — OFFICE VISIT (OUTPATIENT)
Dept: INTERNAL MEDICINE | Facility: CLINIC | Age: 62
End: 2022-05-31

## 2022-05-31 VITALS
BODY MASS INDEX: 46.74 KG/M2 | SYSTOLIC BLOOD PRESSURE: 112 MMHG | HEIGHT: 62 IN | WEIGHT: 254 LBS | OXYGEN SATURATION: 97 % | HEART RATE: 62 BPM | RESPIRATION RATE: 18 BRPM | TEMPERATURE: 97.3 F | DIASTOLIC BLOOD PRESSURE: 70 MMHG

## 2022-05-31 DIAGNOSIS — E78.5 HYPERLIPIDEMIA, UNSPECIFIED HYPERLIPIDEMIA TYPE: Chronic | ICD-10-CM

## 2022-05-31 DIAGNOSIS — K21.9 GASTROESOPHAGEAL REFLUX DISEASE, UNSPECIFIED WHETHER ESOPHAGITIS PRESENT: Chronic | ICD-10-CM

## 2022-05-31 DIAGNOSIS — K58.0 IRRITABLE BOWEL SYNDROME WITH DIARRHEA: Chronic | ICD-10-CM

## 2022-05-31 DIAGNOSIS — I10 PRIMARY HYPERTENSION: Primary | Chronic | ICD-10-CM

## 2022-05-31 PROCEDURE — 99214 OFFICE O/P EST MOD 30 MIN: CPT | Performed by: INTERNAL MEDICINE

## 2022-05-31 RX ORDER — DICYCLOMINE HYDROCHLORIDE 10 MG/1
10 CAPSULE ORAL
Qty: 60 CAPSULE | Refills: 3 | Status: SHIPPED | OUTPATIENT
Start: 2022-05-31 | End: 2022-08-22

## 2022-05-31 RX ORDER — OMEPRAZOLE 20 MG/1
20 CAPSULE, DELAYED RELEASE ORAL DAILY
COMMUNITY
End: 2023-03-13

## 2022-06-01 LAB
CHOLEST SERPL-MCNC: 177 MG/DL (ref 100–199)
CHOLEST/HDLC SERPL: 4.4 RATIO (ref 0–4.4)
HDLC SERPL-MCNC: 40 MG/DL
LDLC SERPL CALC-MCNC: 111 MG/DL (ref 0–99)
TRIGL SERPL-MCNC: 148 MG/DL (ref 0–149)
VLDLC SERPL CALC-MCNC: 26 MG/DL (ref 5–40)

## 2022-08-05 RX ORDER — VALACYCLOVIR HYDROCHLORIDE 500 MG/1
500 TABLET, FILM COATED ORAL DAILY
Qty: 90 TABLET | Refills: 1 | Status: SHIPPED | OUTPATIENT
Start: 2022-08-05 | End: 2023-02-06

## 2022-08-22 RX ORDER — DICYCLOMINE HYDROCHLORIDE 10 MG/1
CAPSULE ORAL
Qty: 60 CAPSULE | Refills: 3 | Status: SHIPPED | OUTPATIENT
Start: 2022-08-22

## 2022-08-31 ENCOUNTER — OFFICE VISIT (OUTPATIENT)
Dept: INTERNAL MEDICINE | Facility: CLINIC | Age: 62
End: 2022-08-31

## 2022-08-31 VITALS
SYSTOLIC BLOOD PRESSURE: 112 MMHG | BODY MASS INDEX: 47.48 KG/M2 | HEART RATE: 69 BPM | RESPIRATION RATE: 16 BRPM | DIASTOLIC BLOOD PRESSURE: 63 MMHG | OXYGEN SATURATION: 99 % | WEIGHT: 258 LBS | HEIGHT: 62 IN | TEMPERATURE: 97.2 F

## 2022-08-31 DIAGNOSIS — Z79.899 MEDICATION MANAGEMENT: ICD-10-CM

## 2022-08-31 DIAGNOSIS — K58.0 IRRITABLE BOWEL SYNDROME WITH DIARRHEA: Chronic | ICD-10-CM

## 2022-08-31 DIAGNOSIS — I10 PRIMARY HYPERTENSION: Primary | Chronic | ICD-10-CM

## 2022-08-31 DIAGNOSIS — E78.5 HYPERLIPIDEMIA, UNSPECIFIED HYPERLIPIDEMIA TYPE: Chronic | ICD-10-CM

## 2022-08-31 PROCEDURE — 99214 OFFICE O/P EST MOD 30 MIN: CPT | Performed by: INTERNAL MEDICINE

## 2022-08-31 NOTE — PROGRESS NOTES
Subjective   Ann-Marie Eaton is a 62 y.o. female.     Chief Complaint   Patient presents with   • Hypertension   • Hyperlipidemia   • Irritable Bowel Syndrome         Hypertension  This is a chronic problem. The current episode started more than 1 year ago. The problem is unchanged. The problem is controlled. Associated symptoms include headaches and peripheral edema. Pertinent negatives include no chest pain, orthopnea, palpitations or shortness of breath.   Hyperlipidemia  This is a chronic problem. The current episode started more than 1 year ago. The problem is controlled. Recent lipid tests were reviewed and are normal. Pertinent negatives include no chest pain or shortness of breath.   Irritable Bowel Syndrome  This is a chronic problem. Associated symptoms include headaches. Pertinent negatives include no chest pain.   Heartburn  She reports no chest pain or no wheezing. This is a chronic problem. The current episode started more than 1 year ago. The problem occurs occasionally. The problem has been unchanged. She has tried a histamine-2 antagonist for the symptoms. The treatment provided significant relief.        The following portions of the patient's history were reviewed and updated as appropriate: allergies, current medications, past social history and problem list.    Outpatient Medications Marked as Taking for the 8/31/22 encounter (Office Visit) with John Cowan MD   Medication Sig Dispense Refill   • atorvastatin (LIPITOR) 20 MG tablet TAKE 1 TABLET BY MOUTH DAILY 90 tablet 1   • dicyclomine (BENTYL) 10 MG capsule TAKE 1 CAPSULE BY MOUTH FOUR TIMES DAILY BEFORE MEALS AND AT BEDTIME 60 capsule 3   • ezetimibe (ZETIA) 10 MG tablet TAKE 1 TABLET BY MOUTH DAILY 90 tablet 1   • hydroCHLOROthiazide (MICROZIDE) 12.5 MG capsule TAKE 1 CAPSULE BY MOUTH DAILY 90 capsule 1   • lisinopril (PRINIVIL,ZESTRIL) 10 MG tablet TAKE 1 TABLET BY MOUTH DAILY 90 tablet 1   • loperamide (IMODIUM) 2 MG capsule Take 8 mg by  mouth As Needed for Diarrhea.     • multivitamin with minerals tablet tablet Take 1 tablet by mouth Daily.     • Naproxen (NAPROSYN PO) Take 440 mg by mouth Daily As Needed (PAIN).     • omeprazole (priLOSEC) 20 MG capsule Take 20 mg by mouth Daily.     • Probiotic Product (PROBIOTIC DAILY PO) Take 1 tablet by mouth Daily. Taking while on antibiotic therapy.     • sertraline (ZOLOFT) 100 MG tablet TAKE 1 AND 1/2 TABLETS BY MOUTH DAILY 135 tablet 1   • triamcinolone (KENALOG) 0.5 % cream Apply to affected area(s) 2 times a day 30 g 0   • valACYclovir (VALTREX) 500 MG tablet TAKE 1 TABLET BY MOUTH DAILY 90 tablet 1   • vitamin D (ERGOCALCIFEROL) 1.25 MG (32621 UT) capsule capsule TAKE 1 CAPSULE BY MOUTH 1 TIME A WEEK 13 capsule 1       Review of Systems   Respiratory: Negative for shortness of breath and wheezing.    Cardiovascular: Positive for leg swelling. Negative for chest pain, palpitations and orthopnea.   Gastrointestinal: Negative for constipation and diarrhea.   Neurological: Positive for headaches.       Objective   Vitals:    08/31/22 0921   BP: 112/63   Pulse: 69   Resp: 16   Temp: 97.2 °F (36.2 °C)   SpO2: 99%          08/31/22 0921   Weight: 117 kg (258 lb)    [unfilled]  Body mass index is 47.18 kg/m².      Physical Exam   Constitutional: She appears well-developed.   Cardiovascular: Normal rate, regular rhythm and normal heart sounds. Exam reveals no gallop.   No murmur heard.  Pulmonary/Chest: Effort normal and breath sounds normal. No respiratory distress. She has no wheezes. She has no rales.   Abdominal: Soft. Normal appearance and bowel sounds are normal. She exhibits no mass. There is no abdominal tenderness. There is no guarding.   Neurological: She is alert.         Problems Addressed this Visit        Cardiac and Vasculature    Hypertension - Primary (Chronic)    Hyperlipidemia (Chronic)       Gastrointestinal Abdominal     IBS (irritable bowel syndrome) (Chronic)      Diagnoses        Codes Comments    Primary hypertension    -  Primary ICD-10-CM: I10  ICD-9-CM: 401.9     Hyperlipidemia, unspecified hyperlipidemia type     ICD-10-CM: E78.5  ICD-9-CM: 272.4     Irritable bowel syndrome with diarrhea     ICD-10-CM: K58.0  ICD-9-CM: 564.1         Assessment & Plan   In for recheck of hypertension, hyperlipidemia, IBS and GERD.  Has diarrhea predominant irritable bowel which is doing pretty well overall.  Takes Imodium when necessary.  She is off of probiotics.  On Metamucil now.  Annual labs today August 2022 including CBC, CMP, lipids, UA.  Lipids q 3 mos. Sertraline dose is back to 150 mg daily.  Depression is better now.  Remain on Naprosyn 225 mg daily for arthritis.  Remains on Zetia 10 mg daily and Lipitor 20 mg daily for hyperlipidemia and those are reviewed today.  Annual preventive exam next visit December 2022.  She is fasting today.    The above information was reviewed again today 08/31/22.  It continues to be accurate as reflected above and is unchanged.  History, physical and review of systems all reviewed and are unchanged.  Medications were reviewed today and continue the current dosing.    PPE today includes face mask and eye shield.         Dragon disclaimer:   Much of this encounter note is an electronic transcription/translation of spoken language to printed text. The electronic translation of spoken language may permit erroneous, or at times, nonsensical words or phrases to be inadvertently transcribed; Although I have reviewed the note for such errors, some may still exist.

## 2022-09-01 LAB
ALBUMIN SERPL-MCNC: 4.1 G/DL (ref 3.5–5.2)
ALBUMIN/GLOB SERPL: 1.9 G/DL
ALP SERPL-CCNC: 94 U/L (ref 39–117)
ALT SERPL-CCNC: 29 U/L (ref 1–33)
AST SERPL-CCNC: 26 U/L (ref 1–32)
BASOPHILS # BLD AUTO: 0.05 10*3/MM3 (ref 0–0.2)
BASOPHILS NFR BLD AUTO: 0.7 % (ref 0–1.5)
BILIRUB SERPL-MCNC: 0.7 MG/DL (ref 0–1.2)
BUN SERPL-MCNC: 14 MG/DL (ref 8–23)
BUN/CREAT SERPL: 24.1 (ref 7–25)
CALCIUM SERPL-MCNC: 9.1 MG/DL (ref 8.6–10.5)
CHLORIDE SERPL-SCNC: 100 MMOL/L (ref 98–107)
CHOLEST SERPL-MCNC: 183 MG/DL (ref 0–200)
CHOLEST/HDLC SERPL: 4.16 {RATIO}
CO2 SERPL-SCNC: 33.1 MMOL/L (ref 22–29)
CREAT SERPL-MCNC: 0.58 MG/DL (ref 0.57–1)
EGFRCR-CYS SERPLBLD CKD-EPI 2021: 101.2 ML/MIN/1.73
EOSINOPHIL # BLD AUTO: 0.23 10*3/MM3 (ref 0–0.4)
EOSINOPHIL NFR BLD AUTO: 3 % (ref 0.3–6.2)
ERYTHROCYTE [DISTWIDTH] IN BLOOD BY AUTOMATED COUNT: 12.3 % (ref 12.3–15.4)
GLOBULIN SER CALC-MCNC: 2.2 GM/DL
GLUCOSE SERPL-MCNC: 106 MG/DL (ref 65–99)
HBA1C MFR BLD: 5.2 % (ref 4.8–5.6)
HCT VFR BLD AUTO: 42.4 % (ref 34–46.6)
HDLC SERPL-MCNC: 44 MG/DL (ref 40–60)
HGB BLD-MCNC: 14 G/DL (ref 12–15.9)
IMM GRANULOCYTES # BLD AUTO: 0.03 10*3/MM3 (ref 0–0.05)
IMM GRANULOCYTES NFR BLD AUTO: 0.4 % (ref 0–0.5)
LDLC SERPL CALC-MCNC: 114 MG/DL (ref 0–100)
LYMPHOCYTES # BLD AUTO: 1.31 10*3/MM3 (ref 0.7–3.1)
LYMPHOCYTES NFR BLD AUTO: 17.2 % (ref 19.6–45.3)
Lab: NORMAL
MCH RBC QN AUTO: 33.9 PG (ref 26.6–33)
MCHC RBC AUTO-ENTMCNC: 33 G/DL (ref 31.5–35.7)
MCV RBC AUTO: 102.7 FL (ref 79–97)
MONOCYTES # BLD AUTO: 0.6 10*3/MM3 (ref 0.1–0.9)
MONOCYTES NFR BLD AUTO: 7.9 % (ref 5–12)
NEUTROPHILS # BLD AUTO: 5.4 10*3/MM3 (ref 1.7–7)
NEUTROPHILS NFR BLD AUTO: 70.8 % (ref 42.7–76)
NRBC BLD AUTO-RTO: 0 /100 WBC (ref 0–0.2)
PLATELET # BLD AUTO: 200 10*3/MM3 (ref 140–450)
POTASSIUM SERPL-SCNC: 3.9 MMOL/L (ref 3.5–5.2)
PROT SERPL-MCNC: 6.3 G/DL (ref 6–8.5)
RBC # BLD AUTO: 4.13 10*6/MM3 (ref 3.77–5.28)
SODIUM SERPL-SCNC: 141 MMOL/L (ref 136–145)
TRIGL SERPL-MCNC: 143 MG/DL (ref 0–150)
VLDLC SERPL CALC-MCNC: 25 MG/DL (ref 5–40)
WBC # BLD AUTO: 7.62 10*3/MM3 (ref 3.4–10.8)
WRITTEN AUTHORIZATION: NORMAL

## 2022-10-10 ENCOUNTER — TELEPHONE (OUTPATIENT)
Dept: INTERNAL MEDICINE | Facility: CLINIC | Age: 62
End: 2022-10-10

## 2022-10-10 NOTE — TELEPHONE ENCOUNTER
Caller: Ann-Marie Eaton    Relationship: Self    Best call back number: [phone]    What form or medical record are you requesting: NOTE TO GET AN ERGONOMIC CHAIR FOR WORK     Who is requesting this form or medical record from you: EMPLOYER    How would you like to receive the form or medical records (pick-up, mail, fax):      Timeframe paperwork needed: WITHIN NEXT WEEK OR SO     Additional notes: PATIENT STATED THAT BECAUSE OF HER DEGENERATIVE DISC DISEASE, THE CHAIR SHE IS USING NOW IS NOT COMFORTABLE FOR HER AT ALL. PLEASE ADVISE.

## 2022-10-10 NOTE — TELEPHONE ENCOUNTER
HUB TO READ    LM for patient to return call to schedule office visit for a face-to-face to get documentation for ERGONOMIC CHAIR

## 2022-10-11 NOTE — TELEPHONE ENCOUNTER
HUB TO READ: Need to schedule a face to face evaluation for chair. Need documentation to submit to insurance/coverage.    Left VM to return call.

## 2022-10-12 NOTE — TELEPHONE ENCOUNTER
3rd attempt to schedule video visit or office visit to set up for ergonomic chair, LM to call office back to scheduled

## 2022-10-21 ENCOUNTER — OFFICE VISIT (OUTPATIENT)
Dept: INTERNAL MEDICINE | Facility: CLINIC | Age: 62
End: 2022-10-21

## 2022-10-21 VITALS
DIASTOLIC BLOOD PRESSURE: 80 MMHG | RESPIRATION RATE: 16 BRPM | WEIGHT: 256 LBS | OXYGEN SATURATION: 97 % | SYSTOLIC BLOOD PRESSURE: 96 MMHG | HEART RATE: 68 BPM | BODY MASS INDEX: 47.11 KG/M2 | TEMPERATURE: 97.3 F | HEIGHT: 62 IN

## 2022-10-21 DIAGNOSIS — L23.9 ALLERGIC CONTACT DERMATITIS, UNSPECIFIED TRIGGER: ICD-10-CM

## 2022-10-21 DIAGNOSIS — M51.37 DDD (DEGENERATIVE DISC DISEASE), LUMBOSACRAL: Primary | ICD-10-CM

## 2022-10-21 DIAGNOSIS — L23.9 ALLERGIC DERMATITIS: ICD-10-CM

## 2022-10-21 PROCEDURE — 99213 OFFICE O/P EST LOW 20 MIN: CPT | Performed by: INTERNAL MEDICINE

## 2022-10-21 RX ORDER — TRIAMCINOLONE ACETONIDE 5 MG/G
CREAM TOPICAL
Qty: 30 G | Refills: 2 | Status: SHIPPED | OUTPATIENT
Start: 2022-10-21

## 2022-10-21 NOTE — PROGRESS NOTES
Subjective   Ann-Marie Eaton is a 62 y.o. female.     Chief Complaint   Patient presents with   • Rash     Patient is feeling well today, but states that she has a rash on both elbows, that are itchy,red, warm and hot. Patient has taken benadryl and topicals for treatment, but the have not helped symptoms         History of Present Illness  In today with chronic low back pain and sciatica into the left leg.  She has a special chair that she uses at home that used to be at work.  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  They are hard to find her procure.  She wants to duplicate that.  Rash  This is a recurrent problem. The current episode started 1 to 4 weeks ago. The affected locations include the right elbow and left elbow.        The following portions of the patient's history were reviewed and updated as appropriate: allergies, current medications, past social history and problem list.    Outpatient Medications Marked as Taking for the 10/21/22 encounter (Office Visit) with John Cowan MD   Medication Sig Dispense Refill   • atorvastatin (LIPITOR) 20 MG tablet TAKE 1 TABLET BY MOUTH DAILY 90 tablet 1   • dicyclomine (BENTYL) 10 MG capsule TAKE 1 CAPSULE BY MOUTH FOUR TIMES DAILY BEFORE MEALS AND AT BEDTIME 60 capsule 3   • ezetimibe (ZETIA) 10 MG tablet TAKE 1 TABLET BY MOUTH DAILY 90 tablet 1   • hydroCHLOROthiazide (MICROZIDE) 12.5 MG capsule TAKE 1 CAPSULE BY MOUTH DAILY 90 capsule 1   • lisinopril (PRINIVIL,ZESTRIL) 10 MG tablet TAKE 1 TABLET BY MOUTH DAILY 90 tablet 1   • loperamide (IMODIUM) 2 MG capsule Take 8 mg by mouth As Needed for Diarrhea.     • multivitamin with minerals tablet tablet Take 1 tablet by mouth Daily.     • Naproxen (NAPROSYN PO) Take 440 mg by mouth Daily As Needed (PAIN).     • omeprazole (priLOSEC) 20 MG capsule Take 20 mg by mouth Daily.     • sertraline (ZOLOFT) 100 MG tablet TAKE 1 AND 1/2 TABLETS BY MOUTH DAILY 135 tablet  1   • triamcinolone (KENALOG) 0.5 % cream Apply to affected area(s) 2 times a day 30 g 0   • valACYclovir (VALTREX) 500 MG tablet TAKE 1 TABLET BY MOUTH DAILY 90 tablet 1   • vitamin D (ERGOCALCIFEROL) 1.25 MG (69733 UT) capsule capsule TAKE 1 CAPSULE BY MOUTH 1 TIME A WEEK 13 capsule 1       Review of Systems   Musculoskeletal: Positive for back pain.   Skin: Positive for rash.       Objective   Vitals:    10/21/22 1451   BP: 96/80   Pulse: 68   Resp: 16   Temp: 97.3 °F (36.3 °C)   SpO2: 97%      Wt Readings from Last 3 Encounters:   10/21/22 116 kg (256 lb)   08/31/22 117 kg (258 lb)   05/31/22 115 kg (254 lb)    Body mass index is 46.81 kg/m².      Physical Exam  Constitutional:       Appearance: Normal appearance.   Skin:     Findings: Rash present.   Neurological:      Mental Status: She is alert.           Problems Addressed this Visit        Neuro    DDD (degenerative disc disease), lumbosacral - Primary   Other Visit Diagnoses     Allergic contact dermatitis, unspecified trigger          Diagnoses       Codes Comments    DDD (degenerative disc disease), lumbosacral    -  Primary ICD-10-CM: M51.37  ICD-9-CM: 722.52     Allergic contact dermatitis, unspecified trigger     ICD-10-CM: L23.9  ICD-9-CM: 692.9         Assessment & Plan   In today with chronic low back pain and recurrent sciatica.  She uses a special chair at home to help relieve her symptoms.  He used to be at work but when she has been working from home its been in the home setting.  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  I think this is reasonable.  We will write a letter supporting this activity.  In the meantime she has had some rash on both elbows for about 10 days.  She has had this before.  It is intensely pruritic.  Looks irritant.  We will resume her triamcinolone cream for this.    The above information was reviewed again today 10/21/22.  It continues to be accurate as  reflected above and is unchanged.  History, physical and review of systems all reviewed and are unchanged.  Medications were reviewed today and continue the current dosing.    PPE today includes face mask and eye shield.               Dragon disclaimer:   Much of this encounter note is an electronic transcription/translation of spoken language to printed text. The electronic translation of spoken language may permit erroneous, or at times, nonsensical words or phrases to be inadvertently transcribed; Although I have reviewed the note for such errors, some may still exist.

## 2022-11-03 RX ORDER — EZETIMIBE 10 MG/1
10 TABLET ORAL DAILY
Qty: 90 TABLET | Refills: 1 | Status: SHIPPED | OUTPATIENT
Start: 2022-11-03

## 2022-11-03 RX ORDER — LISINOPRIL 10 MG/1
10 TABLET ORAL DAILY
Qty: 90 TABLET | Refills: 1 | Status: SHIPPED | OUTPATIENT
Start: 2022-11-03

## 2022-11-03 RX ORDER — SERTRALINE HYDROCHLORIDE 100 MG/1
TABLET, FILM COATED ORAL
Qty: 135 TABLET | Refills: 1 | Status: SHIPPED | OUTPATIENT
Start: 2022-11-03

## 2022-11-03 RX ORDER — HYDROCHLOROTHIAZIDE 12.5 MG/1
12.5 CAPSULE, GELATIN COATED ORAL DAILY
Qty: 90 CAPSULE | Refills: 1 | Status: SHIPPED | OUTPATIENT
Start: 2022-11-03

## 2022-11-15 ENCOUNTER — TELEMEDICINE (OUTPATIENT)
Dept: FAMILY MEDICINE CLINIC | Facility: TELEHEALTH | Age: 62
End: 2022-11-15

## 2022-11-15 DIAGNOSIS — Z20.822 ENCOUNTER BY TELEHEALTH FOR SUSPECTED COVID-19: Primary | ICD-10-CM

## 2022-11-15 DIAGNOSIS — J06.9 UPPER RESPIRATORY TRACT INFECTION, UNSPECIFIED TYPE: ICD-10-CM

## 2022-11-15 PROCEDURE — 99213 OFFICE O/P EST LOW 20 MIN: CPT | Performed by: NURSE PRACTITIONER

## 2022-11-15 RX ORDER — ALBUTEROL SULFATE 90 UG/1
2 AEROSOL, METERED RESPIRATORY (INHALATION) EVERY 4 HOURS PRN
Qty: 18 G | Refills: 0 | Status: SHIPPED | OUTPATIENT
Start: 2022-11-15

## 2022-11-15 NOTE — PROGRESS NOTES
CHIEF COMPLAINT  Chief Complaint   Patient presents with   • URI   • Sore Throat   • Cough         HPI  Ann-Marie Eaton is a 62 y.o. female  presents with complaint of 4 day h/o cough, congestion sore throat and malaise. She reports no fever. She has wheezing at night. No shortness of breath.  She tested negative for covid 19 with a home test. She is using Tylenol cold and flu for symptoms.     Review of Systems   Constitutional: Positive for activity change and fatigue. Negative for chills and fever.   HENT: Positive for congestion and sore throat.    Respiratory: Positive for cough and wheezing. Negative for shortness of breath and stridor.    Cardiovascular: Negative.    Gastrointestinal: Negative.    Musculoskeletal: Negative.    Skin: Negative.    Neurological: Negative.    Hematological: Negative.    Psychiatric/Behavioral: Negative.        Past Medical History:   Diagnosis Date   • Anxiety    • DDD (degenerative disc disease), lumbar    • Eczema    • Fatty liver disease, nonalcoholic    • GERD (gastroesophageal reflux disease)    • History of concussion    • HLD (hyperlipidemia)    • HTN (hypertension)    • IBS (irritable bowel syndrome)    • SHANNAN (obstructive sleep apnea)     NO MACHINE   • Osteoarthritis of right knee    • Urinary incontinence in female     WEARS PADS   • Urolithiasis    • Vitamin D deficiency        Family History   Problem Relation Age of Onset   • Lung cancer Mother    • Aneurysm Father    • COPD Father    • No Known Problems Brother    • No Known Problems Daughter    • No Known Problems Sister    • Malig Hyperthermia Neg Hx        Social History     Socioeconomic History   • Marital status:    Tobacco Use   • Smoking status: Former     Packs/day: 1.00     Years: 15.00     Pack years: 15.00     Types: Cigarettes     Quit date:      Years since quittin.8   • Smokeless tobacco: Never   Substance and Sexual Activity   • Alcohol use: Yes     Alcohol/week: 0.0 - 1.0 standard  drinks     Comment: 1 MONTHLY   • Drug use: No         There were no vitals taken for this visit.    PHYSICAL EXAM  Physical Exam   Constitutional: She is oriented to person, place, and time. She appears well-developed and well-nourished. She does not have a sickly appearance. She does not appear ill. No distress.   Pulmonary/Chest: Effort normal.  No respiratory distress. She no audible wheeze...  Neurological: She is alert and oriented to person, place, and time.   Psychiatric: She has a normal mood and affect.   Vitals reviewed.      Results for orders placed or performed in visit on 08/31/22   Comprehensive Metabolic Panel    Specimen: Blood   Result Value Ref Range    Glucose 106 (H) 65 - 99 mg/dL    BUN 14 8 - 23 mg/dL    Creatinine 0.58 0.57 - 1.00 mg/dL    EGFR Result 101.2 >60.0 mL/min/1.73    BUN/Creatinine Ratio 24.1 7.0 - 25.0    Sodium 141 136 - 145 mmol/L    Potassium 3.9 3.5 - 5.2 mmol/L    Chloride 100 98 - 107 mmol/L    Total CO2 33.1 (H) 22.0 - 29.0 mmol/L    Calcium 9.1 8.6 - 10.5 mg/dL    Total Protein 6.3 6.0 - 8.5 g/dL    Albumin 4.10 3.50 - 5.20 g/dL    Globulin 2.2 gm/dL    A/G Ratio 1.9 g/dL    Total Bilirubin 0.7 0.0 - 1.2 mg/dL    Alkaline Phosphatase 94 39 - 117 U/L    AST (SGOT) 26 1 - 32 U/L    ALT (SGPT) 29 1 - 33 U/L   Lipid Panel With / Chol / HDL Ratio    Specimen: Blood   Result Value Ref Range    Total Cholesterol 183 0 - 200 mg/dL    Triglycerides 143 0 - 150 mg/dL    HDL Cholesterol 44 40 - 60 mg/dL    VLDL Cholesterol Livan 25 5 - 40 mg/dL    LDL Chol Calc (NIH) 114 (H) 0 - 100 mg/dL    Chol/HDL Ratio 4.16    Hemoglobin A1c   Result Value Ref Range    Hemoglobin A1C 5.20 4.80 - 5.60 %   Please Note   Result Value Ref Range    Please note Comment    Written Authorization   Result Value Ref Range    Written Authorization Comment    CBC & Differential    Specimen: Blood   Result Value Ref Range    WBC 7.62 3.40 - 10.80 10*3/mm3    RBC 4.13 3.77 - 5.28 10*6/mm3    Hemoglobin 14.0  12.0 - 15.9 g/dL    Hematocrit 42.4 34.0 - 46.6 %    .7 (H) 79.0 - 97.0 fL    MCH 33.9 (H) 26.6 - 33.0 pg    MCHC 33.0 31.5 - 35.7 g/dL    RDW 12.3 12.3 - 15.4 %    Platelets 200 140 - 450 10*3/mm3    Neutrophil Rel % 70.8 42.7 - 76.0 %    Lymphocyte Rel % 17.2 (L) 19.6 - 45.3 %    Monocyte Rel % 7.9 5.0 - 12.0 %    Eosinophil Rel % 3.0 0.3 - 6.2 %    Basophil Rel % 0.7 0.0 - 1.5 %    Neutrophils Absolute 5.40 1.70 - 7.00 10*3/mm3    Lymphocytes Absolute 1.31 0.70 - 3.10 10*3/mm3    Monocytes Absolute 0.60 0.10 - 0.90 10*3/mm3    Eosinophils Absolute 0.23 0.00 - 0.40 10*3/mm3    Basophils Absolute 0.05 0.00 - 0.20 10*3/mm3    Immature Granulocyte Rel % 0.4 0.0 - 0.5 %    Immature Grans Absolute 0.03 0.00 - 0.05 10*3/mm3    nRBC 0.0 0.0 - 0.2 /100 WBC       Diagnoses and all orders for this visit:    1. Encounter by telehealth for suspected COVID-19 (Primary)  -     COVID-19,[facility] ROUTINE, NP/OP SWAB IN TRANSPORT MEDIA OR ESWAB 72 HR TAT - Swab, Nasopharynx; Future  -     QUESTIONNAIRE SERIES    2. Upper respiratory tract infection, unspecified type  -     albuterol sulfate  (90 Base) MCG/ACT inhaler; Inhale 2 puffs Every 4 (Four) Hours As Needed for Wheezing.  Dispense: 18 g; Refill: 0    continue to rest and increase fluids.   Continue Tylenol cold and flu as directed.   Vitamin C,D and zinc  Quarantine until test results.       The use of a video visit has been reviewed with the patient and verbal informd consent has een obtained. Myself and Ann-Marie Eaton participated in this visit. The patient is located in [address] [place] [address]. I am located in [facility]. Mychart and Zoom were utilized. I spent 15  minutes in the patient's chart for this visit.           Lashell Horn, KATY  11/15/2022  09:26 EST

## 2022-11-15 NOTE — PATIENT INSTRUCTIONS
10 Things You Can Do to Manage Your COVID-19 Symptoms at Home  If you have possible or confirmed COVID-19  Stay home except to get medical care.  Monitor your symptoms carefully. If your symptoms get worse, call your healthcare provider immediately.  Get rest and stay hydrated.  If you have a medical appointment, call the healthcare provider ahead of time and tell them that you have or may have COVID-19.  For medical emergencies, call 911 and notify the dispatch personnel that you have or may have COVID-19.  Cover your cough and sneezes with a tissue or use the inside of your elbow.  Wash your hands often with soap and water for at least 20 seconds or clean your hands with an alcohol-based hand  that contains at least 60% alcohol.  As much as possible, stay in a specific room and away from other people in your home. Also, you should use a separate bathroom, if available. If you need to be around other people in or outside of the home, wear a mask.  Avoid sharing personal items with other people in your household, like dishes, towels, and bedding.  Clean all surfaces that are touched often, like counters, tabletops, and doorknobs. Use household cleaning sprays or wipes according to the label instructions.  cdc.gov/coronavirus  07/16/2021  This information is not intended to replace advice given to you by your health care provider. Make sure you discuss any questions you have with your health care provider.  Document Revised: 09/09/2022 Document Reviewed: 09/09/2022  [employer] Patient Education © 2022 [employer]

## 2022-12-05 ENCOUNTER — OFFICE VISIT (OUTPATIENT)
Dept: INTERNAL MEDICINE | Facility: CLINIC | Age: 62
End: 2022-12-05

## 2022-12-05 VITALS
SYSTOLIC BLOOD PRESSURE: 110 MMHG | DIASTOLIC BLOOD PRESSURE: 70 MMHG | HEART RATE: 75 BPM | BODY MASS INDEX: 45.54 KG/M2 | TEMPERATURE: 97.3 F | OXYGEN SATURATION: 91 % | HEIGHT: 63 IN | WEIGHT: 257 LBS | RESPIRATION RATE: 16 BRPM

## 2022-12-05 DIAGNOSIS — Z00.00 ENCOUNTER FOR PREVENTIVE HEALTH EXAMINATION: Primary | ICD-10-CM

## 2022-12-05 DIAGNOSIS — I10 PRIMARY HYPERTENSION: Chronic | ICD-10-CM

## 2022-12-05 DIAGNOSIS — E78.5 HYPERLIPIDEMIA, UNSPECIFIED HYPERLIPIDEMIA TYPE: Chronic | ICD-10-CM

## 2022-12-05 DIAGNOSIS — K21.9 GASTROESOPHAGEAL REFLUX DISEASE, UNSPECIFIED WHETHER ESOPHAGITIS PRESENT: Chronic | ICD-10-CM

## 2022-12-05 PROCEDURE — 99396 PREV VISIT EST AGE 40-64: CPT | Performed by: INTERNAL MEDICINE

## 2022-12-05 NOTE — PROGRESS NOTES
Subjective   Ann-Marie Eaton is a 62 y.o. female.     Chief Complaint   Patient presents with   • Annual Exam         History of Present Illness  In for annual preventative exam.  Sleeps 6-7 hours per night.  No exercise.  Energy is OK.  Diet is bad.       The following portions of the patient's history were reviewed and updated as appropriate: allergies, current medications, past social history and problem list.    HISTORY  Outpatient Medications Marked as Taking for the 12/5/22 encounter (Office Visit) with John Cowan MD   Medication Sig Dispense Refill   • albuterol sulfate  (90 Base) MCG/ACT inhaler Inhale 2 puffs Every 4 (Four) Hours As Needed for Wheezing. 18 g 0   • atorvastatin (LIPITOR) 20 MG tablet TAKE 1 TABLET BY MOUTH DAILY 90 tablet 1   • dicyclomine (BENTYL) 10 MG capsule TAKE 1 CAPSULE BY MOUTH FOUR TIMES DAILY BEFORE MEALS AND AT BEDTIME 60 capsule 3   • ezetimibe (ZETIA) 10 MG tablet TAKE 1 TABLET BY MOUTH DAILY 90 tablet 1   • hydroCHLOROthiazide (MICROZIDE) 12.5 MG capsule TAKE 1 CAPSULE BY MOUTH DAILY 90 capsule 1   • lisinopril (PRINIVIL,ZESTRIL) 10 MG tablet TAKE 1 TABLET BY MOUTH DAILY 90 tablet 1   • loperamide (IMODIUM) 2 MG capsule Take 8 mg by mouth As Needed for Diarrhea.     • multivitamin with minerals tablet tablet Take 1 tablet by mouth Daily.     • Naproxen (NAPROSYN PO) Take 440 mg by mouth Daily As Needed (PAIN).     • omeprazole (priLOSEC) 20 MG capsule Take 20 mg by mouth Daily.     • sertraline (ZOLOFT) 100 MG tablet TAKE 1 AND 1/2 TABLETS BY MOUTH DAILY 135 tablet 1   • triamcinolone (KENALOG) 0.5 % cream Apply to affected area(s) 2 times a day 30 g 2   • valACYclovir (VALTREX) 500 MG tablet TAKE 1 TABLET BY MOUTH DAILY 90 tablet 1   • vitamin D (ERGOCALCIFEROL) 1.25 MG (09003 UT) capsule capsule TAKE 1 CAPSULE BY MOUTH 1 TIME A WEEK 13 capsule 1     Social History     Socioeconomic History   • Marital status:    Tobacco Use   • Smoking status: Former      Packs/day: 1.00     Years: 15.00     Pack years: 15.00     Types: Cigarettes     Quit date:      Years since quittin.9   • Smokeless tobacco: Never   Substance and Sexual Activity   • Alcohol use: Yes     Alcohol/week: 0.0 - 1.0 standard drinks     Comment: 1 MONTHLY   • Drug use: No     Family History   Problem Relation Age of Onset   • Lung cancer Mother    • Aneurysm Father    • COPD Father    • No Known Problems Brother    • No Known Problems Daughter    • No Known Problems Sister    • Malig Hyperthermia Neg Hx      Past Medical History:   Diagnosis Date   • Anxiety    • DDD (degenerative disc disease), lumbar    • Eczema    • Fatty liver disease, nonalcoholic    • GERD (gastroesophageal reflux disease)    • History of concussion    • HLD (hyperlipidemia)    • HTN (hypertension)    • IBS (irritable bowel syndrome)    • SHANNAN (obstructive sleep apnea)     NO MACHINE   • Osteoarthritis of right knee    • Urinary incontinence in female     WEARS PADS   • Urolithiasis    • Vitamin D deficiency      Past Surgical History:   Procedure Laterality Date   • APPENDECTOMY     • CHOLECYSTECTOMY     • CYSTOSCOPY W/ LASER LITHOTRIPSY  2014   • EXTRACORPOREAL SHOCK WAVE LITHOTRIPSY (ESWL)      STENT  X2   • UT TOTAL KNEE ARTHROPLASTY Left 2016    Procedure: LT TOTAL KNEE ARTHROPLASTY;  Surgeon: Cam Ryan MD;  Location: [facility];  Service: Orthopedics   • TONSILLECTOMY     • TOTAL KNEE ARTHROPLASTY Right 2019    Procedure: RIGHT TOTAL KNEE ARTHROPLASTY;  Surgeon: Cam Ryan MD;  Location: [facility];  Service: Orthopedics   • URETEROTOMY  2014    R Stent- stone       Review of Systems   Constitutional: Negative for appetite change, chills, diaphoresis, fatigue, fever and unexpected weight change.   Respiratory: Negative for cough, chest tightness, shortness of breath and wheezing.    Cardiovascular: Negative for chest pain, palpitations and leg swelling.   Gastrointestinal:  Positive for abdominal pain (GERD) and diarrhea. Negative for anal bleeding, blood in stool, constipation, nausea ( spells), rectal pain and vomiting.   Endocrine: Negative for cold intolerance, heat intolerance and polyuria.   Genitourinary: Negative for difficulty urinating, dysuria, flank pain, frequency, hematuria and urgency.   Musculoskeletal: Positive for arthralgias (right knee and hands). Negative for back pain and myalgias.   Allergic/Immunologic: Positive for environmental allergies.   Neurological: Positive for headaches. Negative for dizziness, syncope, speech difficulty, weakness, light-headedness and numbness.   Hematological: Does not bruise/bleed easily.   Psychiatric/Behavioral: Positive for dysphoric mood. Negative for agitation, confusion and sleep disturbance. The patient is not nervous/anxious.        Objective   Vitals:    12/05/22 1252   BP: 110/70   Pulse: 75   Resp: 16   Temp: 97.3 °F (36.3 °C)   SpO2: 91%          12/05/22  1252   Weight: 117 kg (257 lb)    [unfilled]  Body mass index is 46.23 kg/m².      Physical Exam   Constitutional: She is oriented to person, place, and time. She appears well-developed and well-nourished.   HENT:   Head: Normocephalic and atraumatic.   Right Ear: External ear normal.   Left Ear: External ear normal.   Nose: Nose normal.   Mouth/Throat: Oropharynx is clear and moist.   Eyes: Pupils are equal, round, and reactive to light. Conjunctivae and EOM are normal.   Neck: No JVD present. No thyromegaly present.   Cardiovascular: Normal rate, regular rhythm, normal heart sounds and intact distal pulses. Exam reveals no gallop.   No murmur heard.  Pulmonary/Chest: Effort normal and breath sounds normal. No respiratory distress. She has no wheezes. She has no rales.   Abdominal: Soft. Normal appearance and bowel sounds are normal. She exhibits no distension and no mass. There is no abdominal tenderness. There is no guarding. No hernia.   Musculoskeletal: Normal  range of motion. No edema.   Lymphadenopathy:     She has no cervical adenopathy.   Neurological: She is alert and oriented to person, place, and time. She displays normal reflexes. No cranial nerve deficit. Coordination normal.   Skin: Skin is warm and dry.   Psychiatric: She has a normal mood and affect. Her behavior is normal. Mood, judgment and thought content normal.   Nursing note and vitals reviewed.        Problems Addressed this Visit        Cardiac and Vasculature    Hypertension (Chronic)    Hyperlipidemia (Chronic)    Relevant Orders    Lipid Panel With / Chol / HDL Ratio       Gastrointestinal Abdominal     GERD (gastroesophageal reflux disease) (Chronic)   Other Visit Diagnoses     Encounter for preventive health examination    -  Primary      Diagnoses       Codes Comments    Encounter for preventive health examination    -  Primary ICD-10-CM: Z00.00  ICD-9-CM: V70.0     Primary hypertension     ICD-10-CM: I10  ICD-9-CM: 401.9     Hyperlipidemia, unspecified hyperlipidemia type     ICD-10-CM: E78.5  ICD-9-CM: 272.4     Gastroesophageal reflux disease, unspecified whether esophagitis present     ICD-10-CM: K21.9  ICD-9-CM: 530.81         Assessment & Plan   In for annual preventive exam today December 2022.  Also in for in for recheck of htn, lipids, IBS, and Gerd.  Has diarrhea predominant irritable bowel which has been getting better since starting Metamucil.  Takes Imodium when necessary.  She is off of probiotics.  Using her dicyclomine regularly now.  Also boosted her sertraline dose to 150 mg recently.  Annual labs August 2022.  Lipids q 3 mos.  Depression is better now.  Flu shot is up-to-date.  We will update Prevnar 20 next visit.  She is fasting today.      Prevention counseling was performed today. The counseling performed was routine health maintenance topics including BMI and exercise.    The above information was reviewed again today 12/05/22.  It continues to be accurate as reflected  above and is unchanged.  History, physical and review of systems all reviewed and are unchanged.  Medications were reviewed today and continue the current dosing.    PPE today includes face mask and eye shield.             Dragon disclaimer:   Much of this encounter note is an electronic transcription/translation of spoken language to printed text. The electronic translation of spoken language may permit erroneous, or at times, nonsensical words or phrases to be inadvertently transcribed; Although I have reviewed the note for such errors, some may still exist.

## 2022-12-06 LAB
CHOLEST SERPL-MCNC: 190 MG/DL (ref 100–199)
CHOLEST/HDLC SERPL: 4.2 RATIO (ref 0–4.4)
HDLC SERPL-MCNC: 45 MG/DL
LDLC SERPL CALC-MCNC: 122 MG/DL (ref 0–99)
TRIGL SERPL-MCNC: 126 MG/DL (ref 0–149)
VLDLC SERPL CALC-MCNC: 23 MG/DL (ref 5–40)

## 2022-12-13 ENCOUNTER — PATIENT ROUNDING (BHMG ONLY) (OUTPATIENT)
Dept: INTERNAL MEDICINE | Facility: CLINIC | Age: 62
End: 2022-12-13

## 2022-12-13 NOTE — PROGRESS NOTES
December 13, 2022    Hello, may I speak with Ann-Marie Eaton?    My name is Alee       I am  with [facility]  [address] [place], [place] [address] [address] [address] [phone].    Before we get started may I verify your date of birth? 1960    I am calling to officially welcome you to our practice and ask about your recent visit. Is this a good time to talk? yes    Tell me about your visit with us. What things went well?  Everything was great.       We're always looking for ways to make our patients' experiences even better. Do you have recommendations on ways we may improve?  no    Overall were you satisfied with your first visit to our practice? yes       I appreciate you taking the time to speak with me today. Is there anything else I can do for you? no      Thank you, and have a great day.

## 2023-02-02 ENCOUNTER — TELEPHONE (OUTPATIENT)
Dept: INTERNAL MEDICINE | Facility: CLINIC | Age: 63
End: 2023-02-02

## 2023-02-02 NOTE — TELEPHONE ENCOUNTER
l     Caller: Ann-Marie Eaton    Relationship: Self    Best call back number: [phone]     What is the medical concern/diagnosis: IBS    What specialty or service is being requested: GASTROENTEROLOGY    What is the provider, practice or medical service name: DR OLSON        Any additional details: PATIENT IS REQUESTING THE RECORDS TO BE FAXED [phone]    PLEASE ADVISE.

## 2023-02-06 RX ORDER — VALACYCLOVIR HYDROCHLORIDE 500 MG/1
500 TABLET, FILM COATED ORAL DAILY
Qty: 90 TABLET | Refills: 1 | Status: SHIPPED | OUTPATIENT
Start: 2023-02-06

## 2023-02-07 RX ORDER — ERGOCALCIFEROL 1.25 MG/1
CAPSULE ORAL
Qty: 13 CAPSULE | Refills: 1 | Status: SHIPPED | OUTPATIENT
Start: 2023-02-07

## 2023-03-13 ENCOUNTER — OFFICE VISIT (OUTPATIENT)
Dept: INTERNAL MEDICINE | Facility: CLINIC | Age: 63
End: 2023-03-13
Payer: COMMERCIAL

## 2023-03-13 VITALS
SYSTOLIC BLOOD PRESSURE: 110 MMHG | DIASTOLIC BLOOD PRESSURE: 62 MMHG | HEIGHT: 63 IN | HEART RATE: 90 BPM | OXYGEN SATURATION: 98 % | RESPIRATION RATE: 16 BRPM | TEMPERATURE: 97.7 F | WEIGHT: 256.4 LBS | BODY MASS INDEX: 45.43 KG/M2

## 2023-03-13 DIAGNOSIS — E78.5 HYPERLIPIDEMIA, UNSPECIFIED HYPERLIPIDEMIA TYPE: Chronic | ICD-10-CM

## 2023-03-13 DIAGNOSIS — K58.0 IRRITABLE BOWEL SYNDROME WITH DIARRHEA: Chronic | ICD-10-CM

## 2023-03-13 DIAGNOSIS — I10 PRIMARY HYPERTENSION: Primary | Chronic | ICD-10-CM

## 2023-03-13 PROCEDURE — 99214 OFFICE O/P EST MOD 30 MIN: CPT | Performed by: INTERNAL MEDICINE

## 2023-03-13 RX ORDER — PSYLLIUM SEED (WITH DEXTROSE)
POWDER (GRAM) ORAL DAILY
COMMUNITY

## 2023-03-13 RX ORDER — FAMOTIDINE 20 MG/1
20 TABLET, FILM COATED ORAL 2 TIMES DAILY
COMMUNITY

## 2023-03-13 RX ORDER — NAPROXEN SODIUM 220 MG
220 TABLET ORAL 2 TIMES DAILY PRN
COMMUNITY

## 2023-03-13 RX ORDER — RIFAXIMIN 550 MG/1
TABLET ORAL
COMMUNITY
Start: 2023-03-09

## 2023-03-13 NOTE — PROGRESS NOTES
Subjective   Ann-Marie Eaton is a 63 y.o. female.     Chief Complaint   Patient presents with   • Hypertension   • Hyperlipidemia   • Irritable Bowel Syndrome         Hypertension  This is a chronic problem. The current episode started more than 1 year ago. The problem is unchanged. The problem is controlled. Associated symptoms include peripheral edema. Pertinent negatives include no chest pain, headaches, orthopnea, palpitations or shortness of breath.   Hyperlipidemia  This is a chronic problem. The current episode started more than 1 year ago. The problem is controlled. Recent lipid tests were reviewed and are normal. Pertinent negatives include no chest pain or shortness of breath.   Irritable Bowel Syndrome  This is a chronic problem. Associated symptoms include abdominal pain ( GERD). Pertinent negatives include no chest pain or headaches.   Heartburn  She complains of abdominal pain ( GERD). She reports no chest pain or no wheezing. This is a chronic problem. The current episode started more than 1 year ago. The problem occurs occasionally. The problem has been unchanged. She has tried a histamine-2 antagonist for the symptoms. The treatment provided significant relief.        The following portions of the patient's history were reviewed and updated as appropriate: allergies, current medications, past social history and problem list.    Outpatient Medications Marked as Taking for the 3/13/23 encounter (Office Visit) with John Cowan MD   Medication Sig Dispense Refill   • albuterol sulfate  (90 Base) MCG/ACT inhaler Inhale 2 puffs Every 4 (Four) Hours As Needed for Wheezing. 18 g 0   • atorvastatin (LIPITOR) 20 MG tablet TAKE 1 TABLET BY MOUTH DAILY 90 tablet 1   • dicyclomine (BENTYL) 10 MG capsule TAKE 1 CAPSULE BY MOUTH FOUR TIMES DAILY BEFORE MEALS AND AT BEDTIME 60 capsule 3   • ezetimibe (ZETIA) 10 MG tablet TAKE 1 TABLET BY MOUTH DAILY 90 tablet 1   • famotidine (PEPCID) 20 MG tablet Take 1  tablet by mouth 2 (Two) Times a Day.     • hydroCHLOROthiazide (MICROZIDE) 12.5 MG capsule TAKE 1 CAPSULE BY MOUTH DAILY 90 capsule 1   • lisinopril (PRINIVIL,ZESTRIL) 10 MG tablet TAKE 1 TABLET BY MOUTH DAILY 90 tablet 1   • loperamide (IMODIUM) 2 MG capsule Take 4 capsules by mouth As Needed for Diarrhea.     • multivitamin with minerals tablet tablet Take 1 tablet by mouth Daily.     • naproxen sodium (ALEVE) 220 MG tablet Take 1 tablet by mouth 2 (Two) Times a Day As Needed.     • Psyllium (Metamucil) wafer wafer Take  by mouth Daily.     • sertraline (ZOLOFT) 100 MG tablet TAKE 1 AND 1/2 TABLETS BY MOUTH DAILY 135 tablet 1   • triamcinolone (KENALOG) 0.5 % cream Apply to affected area(s) 2 times a day 30 g 2   • valACYclovir (VALTREX) 500 MG tablet TAKE 1 TABLET BY MOUTH DAILY 90 tablet 1   • vitamin D (ERGOCALCIFEROL) 1.25 MG (01176 UT) capsule capsule TAKE 1 CAPSULE BY MOUTH 1 TIME A WEEK 13 capsule 1   • Xifaxan 550 MG tablet          Review of Systems   Respiratory: Negative for shortness of breath and wheezing.    Cardiovascular: Negative for chest pain, palpitations, orthopnea and leg swelling.   Gastrointestinal: Positive for abdominal pain ( GERD), constipation and diarrhea.   Neurological: Negative for headaches.       Objective   Vitals:    03/13/23 1001   BP: 110/62   Pulse: 90   Resp: 16   Temp: 97.7 °F (36.5 °C)   SpO2: 98%          03/13/23  1001   Weight: 116 kg (256 lb 6.4 oz)    [unfilled]  Body mass index is 45.43 kg/m².      Physical Exam   Constitutional: She appears well-developed.   Cardiovascular: Normal rate, regular rhythm and normal heart sounds. Exam reveals no gallop.   No murmur heard.  Pulmonary/Chest: Effort normal and breath sounds normal. No respiratory distress. She has no wheezes. She has no rales.   Abdominal: Soft. Normal appearance and bowel sounds are normal. She exhibits no mass. There is no abdominal tenderness. There is no guarding.   Neurological: She is alert.          Problems Addressed this Visit        Cardiac and Vasculature    Hypertension - Primary (Chronic)    Hyperlipidemia (Chronic)       Gastrointestinal Abdominal     IBS (irritable bowel syndrome) (Chronic)   Diagnoses       Codes Comments    Primary hypertension    -  Primary ICD-10-CM: I10  ICD-9-CM: 401.9     Hyperlipidemia, unspecified hyperlipidemia type     ICD-10-CM: E78.5  ICD-9-CM: 272.4     Irritable bowel syndrome with diarrhea     ICD-10-CM: K58.0  ICD-9-CM: 564.1         Assessment & Plan   In for recheck of hypertension, hyperlipidemia, IBS and GERD today March 2023.  Has diarrhea predominant irritable bowel which is doing pretty well overall.  Takes Imodium when necessary.  She is off of probiotics.  On Metamucil now.  Annual labs August 2022 including CBC, CMP, lipids, UA.  Lipids q 3 mos. Sertraline dose is back to 150 mg daily.  Depression is better now.  Remains on Naprosyn 225 mg daily for arthritis.  Remains on Zetia 10 mg daily and Lipitor 20 mg daily for hyperlipidemia and those are reviewed today.  She is having quite a bit of heartburn.  She needs to stop her Aleve until that situation resolved itself.  Has an upcoming EGD/colonoscopy.  Annual preventive exam next visit December 2024.  Going to work makes her very anxious now.  She [place] in [place].  Works for the been night shift.  That seems to cause worsening of her irritable bowel as well.  She would like to be able to work more from home and we will give her a work note in that regard.  Prevnar updated today.  She is fasting today.    The above information was reviewed again today 03/13/23.  It continues to be accurate as reflected above and is unchanged.  History, physical and review of systems all reviewed and are unchanged.  Medications were reviewed today and continue the current dosing.    PPE today includes face mask and eye shield.         Dragon disclaimer:   Much of this encounter note is an electronic  transcription/translation of spoken language to printed text. The electronic translation of spoken language may permit erroneous, or at times, nonsensical words or phrases to be inadvertently transcribed; Although I have reviewed the note for such errors, some may still exist.

## 2023-03-14 LAB
CHOLEST SERPL-MCNC: 197 MG/DL (ref 0–200)
CHOLEST/HDLC SERPL: 4.02 {RATIO}
HDLC SERPL-MCNC: 49 MG/DL (ref 40–60)
LDLC SERPL CALC-MCNC: 123 MG/DL (ref 0–100)
TRIGL SERPL-MCNC: 143 MG/DL (ref 0–150)
VLDLC SERPL CALC-MCNC: 25 MG/DL (ref 5–40)

## 2023-05-01 ENCOUNTER — APPOINTMENT (OUTPATIENT)
Dept: CT IMAGING | Facility: HOSPITAL | Age: 63
End: 2023-05-01
Payer: COMMERCIAL

## 2023-05-01 ENCOUNTER — HOSPITAL ENCOUNTER (EMERGENCY)
Facility: HOSPITAL | Age: 63
Discharge: HOME | End: 2023-05-01
Attending: EMERGENCY MEDICINE | Admitting: EMERGENCY MEDICINE
Payer: COMMERCIAL

## 2023-05-01 VITALS
HEIGHT: 62 IN | RESPIRATION RATE: 16 BRPM | DIASTOLIC BLOOD PRESSURE: 64 MMHG | WEIGHT: 250 LBS | BODY MASS INDEX: 46.01 KG/M2 | OXYGEN SATURATION: 96 % | TEMPERATURE: 99 F | SYSTOLIC BLOOD PRESSURE: 125 MMHG | HEART RATE: 68 BPM

## 2023-05-01 DIAGNOSIS — R10.9 FLANK PAIN: Primary | ICD-10-CM

## 2023-05-01 PROBLEM — R16.1 SPLENOMEGALY: Status: ACTIVE | Noted: 2023-05-01

## 2023-05-01 LAB
ALBUMIN SERPL-MCNC: 4.1 G/DL (ref 3.5–5.2)
ALBUMIN/GLOB SERPL: 1.5 G/DL
ALP SERPL-CCNC: 96 U/L (ref 39–117)
ALT SERPL W P-5'-P-CCNC: 30 U/L (ref 1–33)
ANION GAP SERPL CALCULATED.3IONS-SCNC: 7.6 MMOL/L (ref 5–15)
AST SERPL-CCNC: 28 U/L (ref 1–32)
BASOPHILS # BLD AUTO: 0.04 10*3/MM3 (ref 0–0.2)
BASOPHILS NFR BLD AUTO: 0.5 % (ref 0–1.5)
BILIRUB SERPL-MCNC: 0.6 MG/DL (ref 0–1.2)
BILIRUB UR QL STRIP: NEGATIVE
BUN SERPL-MCNC: 10 MG/DL (ref 8–23)
BUN/CREAT SERPL: 16.9 (ref 7–25)
CALCIUM SPEC-SCNC: 9.7 MG/DL (ref 8.6–10.5)
CHLORIDE SERPL-SCNC: 101 MMOL/L (ref 98–107)
CLARITY UR: CLEAR
CO2 SERPL-SCNC: 31.4 MMOL/L (ref 22–29)
COLOR UR: NORMAL
CREAT SERPL-MCNC: 0.59 MG/DL (ref 0.57–1)
DEPRECATED RDW RBC AUTO: 42.3 FL (ref 37–54)
EGFRCR SERPLBLD CKD-EPI 2021: 100.2 ML/MIN/1.73
EOSINOPHIL # BLD AUTO: 0.29 10*3/MM3 (ref 0–0.4)
EOSINOPHIL NFR BLD AUTO: 3.7 % (ref 0.3–6.2)
ERYTHROCYTE [DISTWIDTH] IN BLOOD BY AUTOMATED COUNT: 11.7 % (ref 12.3–15.4)
GLOBULIN UR ELPH-MCNC: 2.8 GM/DL
GLUCOSE SERPL-MCNC: 106 MG/DL (ref 65–99)
GLUCOSE UR STRIP-MCNC: NEGATIVE MG/DL
HCT VFR BLD AUTO: 40.5 % (ref 34–46.6)
HGB BLD-MCNC: 14.6 G/DL (ref 12–15.9)
HGB UR QL STRIP.AUTO: NEGATIVE
IMM GRANULOCYTES # BLD AUTO: 0.03 10*3/MM3 (ref 0–0.05)
IMM GRANULOCYTES NFR BLD AUTO: 0.4 % (ref 0–0.5)
KETONES UR QL STRIP: NEGATIVE
LEUKOCYTE ESTERASE UR QL STRIP.AUTO: NEGATIVE
LYMPHOCYTES # BLD AUTO: 1.53 10*3/MM3 (ref 0.7–3.1)
LYMPHOCYTES NFR BLD AUTO: 19.4 % (ref 19.6–45.3)
MCH RBC QN AUTO: 35.5 PG (ref 26.6–33)
MCHC RBC AUTO-ENTMCNC: 36 G/DL (ref 31.5–35.7)
MCV RBC AUTO: 98.5 FL (ref 79–97)
MONOCYTES # BLD AUTO: 0.7 10*3/MM3 (ref 0.1–0.9)
MONOCYTES NFR BLD AUTO: 8.9 % (ref 5–12)
NEUTROPHILS NFR BLD AUTO: 5.28 10*3/MM3 (ref 1.7–7)
NEUTROPHILS NFR BLD AUTO: 67.1 % (ref 42.7–76)
NITRITE UR QL STRIP: NEGATIVE
NRBC BLD AUTO-RTO: 0 /100 WBC (ref 0–0.2)
PH UR STRIP.AUTO: 6 [PH] (ref 5–8)
PLATELET # BLD AUTO: 211 10*3/MM3 (ref 140–450)
PMV BLD AUTO: 9.8 FL (ref 6–12)
POTASSIUM SERPL-SCNC: 3.2 MMOL/L (ref 3.5–5.2)
PROT SERPL-MCNC: 6.9 G/DL (ref 6–8.5)
PROT UR QL STRIP: NEGATIVE
RBC # BLD AUTO: 4.11 10*6/MM3 (ref 3.77–5.28)
SODIUM SERPL-SCNC: 140 MMOL/L (ref 136–145)
SP GR UR STRIP: <=1.005 (ref 1–1.03)
UROBILINOGEN UR QL STRIP: NORMAL
WBC NRBC COR # BLD: 7.87 10*3/MM3 (ref 3.4–10.8)

## 2023-05-01 PROCEDURE — 83690 ASSAY OF LIPASE: CPT | Performed by: INTERNAL MEDICINE

## 2023-05-01 PROCEDURE — 25010000002 KETOROLAC TROMETHAMINE PER 15 MG: Performed by: EMERGENCY MEDICINE

## 2023-05-01 PROCEDURE — 25010000002 MORPHINE PER 10 MG: Performed by: EMERGENCY MEDICINE

## 2023-05-01 PROCEDURE — 85025 COMPLETE CBC W/AUTO DIFF WBC: CPT | Performed by: EMERGENCY MEDICINE

## 2023-05-01 PROCEDURE — 96374 THER/PROPH/DIAG INJ IV PUSH: CPT

## 2023-05-01 PROCEDURE — 99283 EMERGENCY DEPT VISIT LOW MDM: CPT

## 2023-05-01 PROCEDURE — 80053 COMPREHEN METABOLIC PANEL: CPT | Performed by: EMERGENCY MEDICINE

## 2023-05-01 PROCEDURE — 81003 URINALYSIS AUTO W/O SCOPE: CPT | Performed by: EMERGENCY MEDICINE

## 2023-05-01 PROCEDURE — 25010000002 ONDANSETRON PER 1 MG: Performed by: EMERGENCY MEDICINE

## 2023-05-01 PROCEDURE — 74176 CT ABD & PELVIS W/O CONTRAST: CPT

## 2023-05-01 PROCEDURE — 96375 TX/PRO/DX INJ NEW DRUG ADDON: CPT

## 2023-05-01 RX ORDER — ONDANSETRON 2 MG/ML
4 INJECTION INTRAMUSCULAR; INTRAVENOUS ONCE
Status: COMPLETED | OUTPATIENT
Start: 2023-05-01 | End: 2023-05-01

## 2023-05-01 RX ORDER — KETOROLAC TROMETHAMINE 15 MG/ML
15 INJECTION, SOLUTION INTRAMUSCULAR; INTRAVENOUS ONCE
Status: COMPLETED | OUTPATIENT
Start: 2023-05-01 | End: 2023-05-01

## 2023-05-01 RX ORDER — MORPHINE SULFATE 2 MG/ML
4 INJECTION, SOLUTION INTRAMUSCULAR; INTRAVENOUS ONCE
Status: COMPLETED | OUTPATIENT
Start: 2023-05-01 | End: 2023-05-01

## 2023-05-01 RX ORDER — TRAMADOL HYDROCHLORIDE 50 MG/1
50 TABLET ORAL EVERY 8 HOURS PRN
Qty: 9 TABLET | Refills: 0 | Status: SHIPPED | OUTPATIENT
Start: 2023-05-01 | End: 2023-05-04

## 2023-05-01 RX ORDER — SODIUM CHLORIDE 0.9 % (FLUSH) 0.9 %
10 SYRINGE (ML) INJECTION AS NEEDED
Status: DISCONTINUED | OUTPATIENT
Start: 2023-05-01 | End: 2023-05-01 | Stop reason: HOSPADM

## 2023-05-01 RX ADMIN — ONDANSETRON 4 MG: 2 INJECTION INTRAMUSCULAR; INTRAVENOUS at 17:25

## 2023-05-01 RX ADMIN — MORPHINE SULFATE 4 MG: 2 INJECTION, SOLUTION INTRAMUSCULAR; INTRAVENOUS at 17:24

## 2023-05-01 RX ADMIN — KETOROLAC TROMETHAMINE 15 MG: 15 INJECTION, SOLUTION INTRAMUSCULAR; INTRAVENOUS at 17:24

## 2023-05-01 RX ADMIN — SODIUM CHLORIDE 1000 ML: 9 INJECTION, SOLUTION INTRAVENOUS at 17:18

## 2023-05-01 NOTE — ED PROVIDER NOTES
EMERGENCY DEPARTMENT ENCOUNTER    Room Number:  19/19  Date seen:  5/1/2023  PCP: John Cowan MD  Historian(s): Patient      HPI:  Chief Complaint: Flank pain  A complete HPI/ROS/PMH/PSH/SH/FH are unobtainable / limited due to: N/A  Context: Ann-Marie Eaton is a 63 y.o. female who presents to the ED c/o persistent right-sided flank pain for the past few days.  Patient is worried that it might be related to a kidney stone.  She has had a history of kidney stones in the remote past.  She says the pain does not radiate into the lower abdomen or groin nor does it radiate into the leg.  She denies any blood in the urine.  She denies any diarrhea.  She says that her job requirements involve sitting in a chair for prolonged periods and that makes the pain even worse.    PAST MEDICAL HISTORY  Active Ambulatory Problems     Diagnosis Date Noted   • Osteoarthritis, knee 04/18/2016   • Hypertension 07/11/2016   • SHANNAN (obstructive sleep apnea) 07/11/2016   • IBS (irritable bowel syndrome) 07/11/2016   • Fatty liver disease, nonalcoholic 07/11/2016   • Vitamin D deficiency 07/11/2016   • DDD (degenerative disc disease), lumbosacral 07/11/2016   • Hyperlipidemia 07/11/2016   • Kidney stone 07/11/2016   • Depression 09/15/2016   • Anxiety, generalized 10/06/2016   • GERD (gastroesophageal reflux disease) 10/06/2016     Resolved Ambulatory Problems     Diagnosis Date Noted   • Gastric reflux 07/11/2016   • Submandibular space infection 05/27/2018   • Dental will-implantitis 05/27/2018   • Leukocytosis 05/28/2018     Past Medical History:   Diagnosis Date   • Anxiety    • DDD (degenerative disc disease), lumbar    • Eczema    • History of concussion    • HLD (hyperlipidemia)    • HTN (hypertension)    • Osteoarthritis of right knee    • Urinary incontinence in female    • Urolithiasis          PAST SURGICAL HISTORY  Past Surgical History:   Procedure Laterality Date   • APPENDECTOMY     • CHOLECYSTECTOMY     • CYSTOSCOPY W/ LASER  LITHOTRIPSY  2014   • EXTRACORPOREAL SHOCK WAVE LITHOTRIPSY (ESWL)      STENT  X2   • NM ARTHRP KNE CONDYLE&PLATU MEDIAL&LAT COMPARTMENTS Left 2016    Procedure: LT TOTAL KNEE ARTHROPLASTY;  Surgeon: Cam Ryan MD;  Location: [facility] OR;  Service: Orthopedics   • TONSILLECTOMY     • TOTAL KNEE ARTHROPLASTY Right 2019    Procedure: RIGHT TOTAL KNEE ARTHROPLASTY;  Surgeon: Cam Ryan MD;  Location: [facility] OR;  Service: Orthopedics   • URETEROTOMY  2014    R Stent- stone         FAMILY HISTORY  Family History   Problem Relation Age of Onset   • Lung cancer Mother    • Aneurysm Father    • COPD Father    • No Known Problems Brother    • No Known Problems Daughter    • No Known Problems Sister    • Malig Hyperthermia Neg Hx          SOCIAL HISTORY  Social History     Socioeconomic History   • Marital status:    Tobacco Use   • Smoking status: Former     Packs/day: 1.00     Years: 15.00     Pack years: 15.00     Types: Cigarettes     Quit date:      Years since quittin.3   • Smokeless tobacco: Never   Vaping Use   • Vaping Use: Never used   Substance and Sexual Activity   • Alcohol use: Yes     Alcohol/week: 0.0 - 1.0 standard drinks     Comment: 1 MONTHLY   • Drug use: No   • Sexual activity: Defer         ALLERGIES  Percocet [oxycodone-acetaminophen], Darvon [propoxyphene], Grass, Other, Pollen extract, Strawberry extract, Sulfa antibiotics, and Tree extract        REVIEW OF SYSTEMS  Review of Systems   Constitutional: Negative for activity change, diaphoresis and fever.   HENT: Negative.    Eyes: Negative for pain and visual disturbance.   Respiratory: Negative for cough and shortness of breath.    Cardiovascular: Negative for chest pain.   Gastrointestinal: Negative for abdominal pain and diarrhea.   Genitourinary: Positive for flank pain. Negative for dysuria.   Musculoskeletal: Positive for back pain and myalgias.   Skin: Negative for color change and rash.    Neurological: Negative for syncope and headaches.   All other systems reviewed and are negative.           PHYSICAL EXAM  ED Triage Vitals   Temp Heart Rate Resp BP SpO2   05/01/23 1649 05/01/23 1649 05/01/23 1649 05/01/23 1715 05/01/23 1650   99 °F (37.2 °C) 104 16 145/88 95 %      Temp src Heart Rate Source Patient Position BP Location FiO2 (%)   -- 05/01/23 1715 -- -- --    Monitor          Physical Exam      GENERAL: Pleasant lady, appears somewhat uncomfortable but no diaphoresis and no acute distress  HENT: nares patent, normocephalic and atraumatic  EYES: no scleral icterus, EOMI, normal conjunctiva  CV: regular rhythm, normal rate, normal distal pulses  RESPIRATORY: normal effort, no stridor, lungs clear bilaterally  ABDOMEN: soft, nontender in the 4 quadrants without any peritoneal features.  There is mild to moderate tenderness at the right CVA and right lumbosacral back soft tissues region.  MUSCULOSKELETAL: no deformity, no asymmetry  NEURO: alert, moves all extremities, follows commands, no focal deficits.  PSYCH:  calm, cooperative  SKIN: warm, dry    Vital signs and nursing notes reviewed.        LAB RESULTS  Recent Results (from the past 24 hour(s))   Comprehensive Metabolic Panel    Collection Time: 05/01/23  5:23 PM    Specimen: Blood   Result Value Ref Range    Glucose 106 (H) 65 - 99 mg/dL    BUN 10 8 - 23 mg/dL    Creatinine 0.59 0.57 - 1.00 mg/dL    Sodium 140 136 - 145 mmol/L    Potassium 3.2 (L) 3.5 - 5.2 mmol/L    Chloride 101 98 - 107 mmol/L    CO2 31.4 (H) 22.0 - 29.0 mmol/L    Calcium 9.7 8.6 - 10.5 mg/dL    Total Protein 6.9 6.0 - 8.5 g/dL    Albumin 4.1 3.5 - 5.2 g/dL    ALT (SGPT) 30 1 - 33 U/L    AST (SGOT) 28 1 - 32 U/L    Alkaline Phosphatase 96 39 - 117 U/L    Total Bilirubin 0.6 0.0 - 1.2 mg/dL    Globulin 2.8 gm/dL    A/G Ratio 1.5 g/dL    BUN/Creatinine Ratio 16.9 7.0 - 25.0    Anion Gap 7.6 5.0 - 15.0 mmol/L    eGFR 100.2 >60.0 mL/min/1.73   CBC Auto Differential     Collection Time: 05/01/23  5:23 PM    Specimen: Blood   Result Value Ref Range    WBC 7.87 3.40 - 10.80 10*3/mm3    RBC 4.11 3.77 - 5.28 10*6/mm3    Hemoglobin 14.6 12.0 - 15.9 g/dL    Hematocrit 40.5 34.0 - 46.6 %    MCV 98.5 (H) 79.0 - 97.0 fL    MCH 35.5 (H) 26.6 - 33.0 pg    MCHC 36.0 (H) 31.5 - 35.7 g/dL    RDW 11.7 (L) 12.3 - 15.4 %    RDW-SD 42.3 37.0 - 54.0 fl    MPV 9.8 6.0 - 12.0 fL    Platelets 211 140 - 450 10*3/mm3    Neutrophil % 67.1 42.7 - 76.0 %    Lymphocyte % 19.4 (L) 19.6 - 45.3 %    Monocyte % 8.9 5.0 - 12.0 %    Eosinophil % 3.7 0.3 - 6.2 %    Basophil % 0.5 0.0 - 1.5 %    Immature Grans % 0.4 0.0 - 0.5 %    Neutrophils, Absolute 5.28 1.70 - 7.00 10*3/mm3    Lymphocytes, Absolute 1.53 0.70 - 3.10 10*3/mm3    Monocytes, Absolute 0.70 0.10 - 0.90 10*3/mm3    Eosinophils, Absolute 0.29 0.00 - 0.40 10*3/mm3    Basophils, Absolute 0.04 0.00 - 0.20 10*3/mm3    Immature Grans, Absolute 0.03 0.00 - 0.05 10*3/mm3    nRBC 0.0 0.0 - 0.2 /100 WBC   Urinalysis With Culture If Indicated - Urine, Clean Catch    Collection Time: 05/01/23  6:16 PM    Specimen: Urine, Clean Catch   Result Value Ref Range    Color, UA Straw Yellow, Straw    Appearance, UA Clear Clear    pH, UA 6.0 5.0 - 8.0    Specific Gravity, UA <=1.005 1.005 - 1.030    Glucose, UA Negative Negative    Ketones, UA Negative Negative    Bilirubin, UA Negative Negative    Blood, UA Negative Negative    Protein, UA Negative Negative    Leuk Esterase, UA Negative Negative    Nitrite, UA Negative Negative    Urobilinogen, UA 0.2 E.U./dL 0.2 - 1.0 E.U./dL       Ordered the above labs and reviewed the results.        RADIOLOGY  CT Abdomen Pelvis Without Contrast    Result Date: 5/1/2023  CT ABDOMEN PELVIS WO CONTRAST-  INDICATIONS: Flank pain  TECHNIQUE: Radiation dose reduction techniques were utilized, including automated exposure control and exposure modulation based on body size. Unenhanced ABDOMEN AND PELVIS CT  COMPARISON: 07/11/2021  FINDINGS:   Diffuse low-density of the liver is noted, compatible with steatosis. The gallbladder is surgically absent.  A infiltration of the pancreatic head is seen.  The spleen is enlarged, 13.5 cm. Splenic calcifications are noted, compatible with old granulomatous disease.  A nonobstructive calcification in the left kidney measures 6 mm. No other urolithiasis is noted. Slight/borderline right hydronephrosis was ossific cause identified. No left hydronephrosis.  Otherwise unremarkable unenhanced appearance of the liver, spleen, adrenal glands, pancreas, kidneys, bladder. A lobulated contour of the uterus suggests uterine fibroid disease, he further characterized with ultrasound if indicated (appearance is similar to prior exam).  No bowel obstruction or abnormal bowel thickening is identified. Colonic diverticula are seen that do not appear inflamed. No appendix is noted, compatible with stated history of prior appendectomy.  No free intraperitoneal gas or free fluid.  Scattered small mesenteric and para-aortic lymph nodes are seen that are not significant by size criteria. A 6 mm short axis left para-aortic lymph node on axial image 69, is borderline, nonspecific, not significantly changed.  Abdominal aorta is not aneurysmal. Aortic and other arterial calcifications are present.  The lung bases show old granulomatous disease.  Degenerative changes are seen in the spine. No acute fracture is identified.          1. Nonobstructive left nephrolithiasis. Slight/borderline right hydronephrosis without a specific cause identified. 2. Colonic diverticulosis. No acute inflammatory process of bowel is identified. Follow-up as indications persist.  This report was finalized on 5/1/2023 6:22 PM by Dr. Edy Kraft M.D.        Ordered the above noted radiological studies. Reviewed by me in PACS.          PROCEDURES  Procedures        MEDICATIONS GIVEN IN ER  Medications   sodium chloride 0.9 % flush 10 mL (has no administration  in time range)   morphine injection 4 mg (4 mg Intravenous Given 5/1/23 1724)   ketorolac (TORADOL) injection 15 mg (15 mg Intravenous Given 5/1/23 1724)   ondansetron (ZOFRAN) injection 4 mg (4 mg Intravenous Given 5/1/23 1725)   sodium chloride 0.9 % bolus 1,000 mL (1,000 mL Intravenous New Bag 5/1/23 1718)           MEDICAL DECISION MAKING, PROGRESS, and CONSULTS    All labs have been independently reviewed by me.  All radiology studies have been reviewed by me and I have also reviewed the radiology report.   EKG's independently viewed and interpreted by me.  Discussion below represents my analysis of pertinent findings related to patient's condition, differential diagnosis, treatment plan and final disposition.      Additional sources:  - Discussed/ obtained information from independent historians: None    - External (non-ED) record review: I reviewed the most rec PCP office progress note from March 13, 2023 when patient had follow-up care for hypertension, hyperlipidemia and IBS symptoms.    - Chronic or social conditions impacting care: None      Orders placed during this visit:  Orders Placed This Encounter   Procedures   • CT Abdomen Pelvis Without Contrast   • Comprehensive Metabolic Panel   • Urinalysis With Culture If Indicated - Urine, Clean Catch   • CBC Auto Differential   • Insert Peripheral IV   • CBC & Differential           Differential diagnosis includes but is not limited to:    Kidney stone, UTI, pyelonephritis, IBS, bowel obstruction, musculoskeletal pain, sciatica      Independent interpretation of labs, radiology studies, and discussions with consultants:  ED Course as of 05/01/23 1851   Mon May 01, 2023   1704 We will order CBC and CMP as well as urinalysis.  Urinalysis should help to rule out pyelonephritis.  Also ordering CT scan of the abdomen and pelvis to look for any kidney stone. [BUZZ]   1847 I independently interpreted the CT scan abdomen pelvis and my findings are: No bowel  obstruction, no free air. [BUZZ]   1848 Patient resting comfortably at this time.  I reviewed all the test results with her at the bedside.  I am not sure what is the source of this nonspecific flank pain presentation.  Perhaps she recently passed a kidney stone and that is why we are seeing some mild hydronephrosis on the affected side.  Either way, I think that this pain should be self-limited.  I think she is safe to discharge home in good condition for continued symptomatic management.  Will encourage prompt follow-up with her PCP this week for repeat evaluation. [BUZZ]      ED Course User Index  [BUZZ] Roland Hernandez MD         DIAGNOSIS  Final diagnoses:   Flank pain         DISPOSITION  Discharge home        Latest Documented Vital Signs:  As of 18:51 EDT  BP- 127/69 HR- 62 Temp- 99 °F (37.2 °C) O2 sat- 97%      LUPE reviewed by Roland Hernandez MD       --    Please note that portions of this were completed with a voice recognition program.       Note Disclaimer: At [facility], we believe that sharing information builds trust and better relationships. You are receiving this note because you are receiving care at [facility] or recently visited. It is possible you will see health information before a provider has talked with you about it. This kind of information can be easy to misunderstand. To help you fully understand what it means for your health, we urge you to discuss this note with your provider.           Roland Hernandez MD  05/01/23 3833

## 2023-05-01 NOTE — DISCHARGE INSTRUCTIONS
Rest as needed.  May place heating pad on affected area as needed.  Drink plenty of fluids as tolerated.  Please return to the emergency room for any worsening pain, fevers, nausea, vomiting, difficulties urinating or any other concerns.

## 2023-05-02 ENCOUNTER — OFFICE VISIT (OUTPATIENT)
Dept: INTERNAL MEDICINE | Facility: CLINIC | Age: 63
End: 2023-05-02
Payer: COMMERCIAL

## 2023-05-02 VITALS
TEMPERATURE: 97.1 F | SYSTOLIC BLOOD PRESSURE: 136 MMHG | HEART RATE: 70 BPM | OXYGEN SATURATION: 96 % | WEIGHT: 250 LBS | DIASTOLIC BLOOD PRESSURE: 76 MMHG | BODY MASS INDEX: 46.01 KG/M2 | HEIGHT: 62 IN

## 2023-05-02 DIAGNOSIS — M54.9 ACUTE RIGHT-SIDED BACK PAIN, UNSPECIFIED BACK LOCATION: ICD-10-CM

## 2023-05-02 DIAGNOSIS — N20.2 CALCULUS OF KIDNEY WITH CALCULUS OF URETER: ICD-10-CM

## 2023-05-02 DIAGNOSIS — N20.2 CALCULUS OF KIDNEY WITH CALCULUS OF URETER: Primary | ICD-10-CM

## 2023-05-02 LAB — LIPASE SERPL-CCNC: 17 U/L (ref 13–60)

## 2023-05-02 RX ORDER — SERTRALINE HYDROCHLORIDE 100 MG/1
TABLET, FILM COATED ORAL
Qty: 135 TABLET | Refills: 1 | Status: SHIPPED | OUTPATIENT
Start: 2023-05-02

## 2023-05-02 RX ORDER — EZETIMIBE 10 MG/1
10 TABLET ORAL DAILY
Qty: 90 TABLET | Refills: 1 | Status: SHIPPED | OUTPATIENT
Start: 2023-05-02

## 2023-05-02 RX ORDER — MONTELUKAST SODIUM 4 MG/1
TABLET, CHEWABLE ORAL
COMMUNITY
Start: 2023-04-25

## 2023-05-02 RX ORDER — HYDROCHLOROTHIAZIDE 12.5 MG/1
12.5 CAPSULE, GELATIN COATED ORAL DAILY
Qty: 90 CAPSULE | Refills: 1 | Status: SHIPPED | OUTPATIENT
Start: 2023-05-02

## 2023-05-02 RX ORDER — LISINOPRIL 10 MG/1
10 TABLET ORAL DAILY
Qty: 90 TABLET | Refills: 1 | Status: SHIPPED | OUTPATIENT
Start: 2023-05-02

## 2023-05-02 RX ORDER — POTASSIUM CHLORIDE 750 MG/1
10 TABLET, FILM COATED, EXTENDED RELEASE ORAL 2 TIMES DAILY
Qty: 60 TABLET | Refills: 2 | Status: SHIPPED | OUTPATIENT
Start: 2023-05-02

## 2023-05-02 NOTE — PROGRESS NOTES
Subjective   Ann-Marie Eaton is a 63 y.o. female.     Chief Complaint   Patient presents with   • GI Problem         History of Present Illness  Began 5 days ago with awakening with right flank pain.  Lasted a few hours.  The following day she had similar symptoms for a few hours after awakening.  She did pretty well on Sunday.  Yesterday she began with more pain and it intensified and she went to the emergency room where a CT scan of the abdomen was performed.  She has had a prior history of kidney stones.  This felt like a kidney stone to her.  The pain was not mechanical.  No worse with bending or twisting.  There has been no tenderness.  The CT showed nonobstructive urolithiasis on the left.  Some hydrant nephrosis on the right but no stone on the right.       The following portions of the patient's history were reviewed and updated as appropriate: allergies, current medications, past social history and problem list.    Outpatient Medications Marked as Taking for the 5/2/23 encounter (Office Visit) with John Cowan MD   Medication Sig Dispense Refill   • albuterol sulfate  (90 Base) MCG/ACT inhaler Inhale 2 puffs Every 4 (Four) Hours As Needed for Wheezing. 18 g 0   • atorvastatin (LIPITOR) 20 MG tablet TAKE 1 TABLET BY MOUTH DAILY 90 tablet 1   • colestipol (COLESTID) 1 g tablet TAKE 1 TABLET ONCE TO TWICE DAILY PRN DIARRHEA AND STOOL URGENCY     • dicyclomine (BENTYL) 10 MG capsule TAKE 1 CAPSULE BY MOUTH FOUR TIMES DAILY BEFORE MEALS AND AT BEDTIME 60 capsule 3   • ezetimibe (ZETIA) 10 MG tablet TAKE 1 TABLET BY MOUTH DAILY 90 tablet 1   • famotidine (PEPCID) 20 MG tablet Take 1 tablet by mouth 2 (Two) Times a Day.     • hydroCHLOROthiazide (MICROZIDE) 12.5 MG capsule TAKE 1 CAPSULE BY MOUTH DAILY 90 capsule 1   • lisinopril (PRINIVIL,ZESTRIL) 10 MG tablet TAKE 1 TABLET BY MOUTH DAILY 90 tablet 1   • loperamide (IMODIUM) 2 MG capsule Take 4 capsules by mouth As Needed for Diarrhea.     •  multivitamin with minerals tablet tablet Take 1 tablet by mouth Daily.     • naproxen sodium (ALEVE) 220 MG tablet Take 1 tablet by mouth 2 (Two) Times a Day As Needed.     • Psyllium (Metamucil) wafer wafer Take  by mouth Daily.     • sertraline (ZOLOFT) 100 MG tablet TAKE 1 AND 1/2 TABLETS BY MOUTH DAILY 135 tablet 1   • traMADol (ULTRAM) 50 MG tablet Take 1 tablet by mouth Every 8 (Eight) Hours As Needed for Moderate Pain for up to 3 days. 9 tablet 0   • triamcinolone (KENALOG) 0.5 % cream Apply to affected area(s) 2 times a day 30 g 2   • valACYclovir (VALTREX) 500 MG tablet TAKE 1 TABLET BY MOUTH DAILY 90 tablet 1   • vitamin D (ERGOCALCIFEROL) 1.25 MG (22000 UT) capsule capsule TAKE 1 CAPSULE BY MOUTH 1 TIME A WEEK 13 capsule 1   • Xifaxan 550 MG tablet          Review of Systems   Constitutional: Negative for chills and fever.   Gastrointestinal: Negative for abdominal pain, nausea and vomiting.   Musculoskeletal: Positive for back pain.       Objective   Vitals:    05/02/23 1407   BP: 136/76   Pulse: 70   Temp: 97.1 °F (36.2 °C)   SpO2: 96%      Wt Readings from Last 3 Encounters:   05/02/23 113 kg (250 lb)   05/01/23 113 kg (250 lb)   03/13/23 116 kg (256 lb 6.4 oz)    Body mass index is 45.71 kg/m².      Physical Exam  Constitutional:       Appearance: Normal appearance.   Abdominal:      General: There is no distension.      Palpations: Abdomen is soft.      Tenderness: There is no abdominal tenderness. There is no guarding.   Neurological:      Mental Status: She is alert.           Problems Addressed this Visit    None  Visit Diagnoses     Calculus of kidney with calculus of ureter    -  Primary      Diagnoses       Codes Comments    Calculus of kidney with calculus of ureter    -  Primary ICD-10-CM: N20.2  ICD-9-CM: 592.0, 592.1         Assessment & Plan   In today with 5 days of right flank pain.  Seems to be improving.  She is at least 50 or 60% improved from yesterday.  She thought it felt like a  kidney stone.  It certainly behaves like 1.  There was some hydronephrosis on the right side which would be a fit with a passed stone or at least an invisible stone.  There was some infiltration of the pancreatic head that is nondescript.  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  Fatty liver has been chronic.  She has had a prior history of kidney stones in the past.  For now we will simply observe.  We will check a lipase today regarding this pancreatic abnormality.  Potassium was bit low so we will add KCl 20 mEq once per day.    The above information was reviewed again today 05/02/23.  It continues to be accurate as reflected above and is unchanged.  History, physical and review of systems all reviewed and are unchanged.  Medications were reviewed today and continue the current dosing.    PPE today includes face mask and eye shield.             Dragon disclaimer:   Part of this note may be an electronic transcription/translation of spoken language to printed text using the Dragon Dictation System.

## 2023-06-11 ENCOUNTER — HOSPITAL ENCOUNTER (EMERGENCY)
Facility: HOSPITAL | Age: 63
Discharge: HOME | End: 2023-06-11
Attending: EMERGENCY MEDICINE | Admitting: EMERGENCY MEDICINE
Payer: COMMERCIAL

## 2023-06-11 VITALS
BODY MASS INDEX: 43.41 KG/M2 | TEMPERATURE: 97.6 F | RESPIRATION RATE: 17 BRPM | HEART RATE: 94 BPM | OXYGEN SATURATION: 96 % | SYSTOLIC BLOOD PRESSURE: 157 MMHG | DIASTOLIC BLOOD PRESSURE: 89 MMHG | HEIGHT: 63 IN | WEIGHT: 245 LBS

## 2023-06-11 DIAGNOSIS — S61.012A LACERATION OF SKIN OF LEFT THUMB, INITIAL ENCOUNTER: Primary | ICD-10-CM

## 2023-06-11 DIAGNOSIS — Z23 NEED FOR TDAP VACCINATION: ICD-10-CM

## 2023-06-11 PROCEDURE — 90471 IMMUNIZATION ADMIN: CPT | Performed by: PHYSICIAN ASSISTANT

## 2023-06-11 PROCEDURE — 99283 EMERGENCY DEPT VISIT LOW MDM: CPT

## 2023-06-11 PROCEDURE — 90715 TDAP VACCINE 7 YRS/> IM: CPT | Performed by: PHYSICIAN ASSISTANT

## 2023-06-11 PROCEDURE — 25010000002 TETANUS-DIPHTH-ACELL PERTUSSIS 5-2.5-18.5 LF-MCG/0.5 SUSPENSION PREFILLED SYRINGE: Performed by: PHYSICIAN ASSISTANT

## 2023-06-11 RX ADMIN — TETANUS TOXOID, REDUCED DIPHTHERIA TOXOID AND ACELLULAR PERTUSSIS VACCINE, ADSORBED 0.5 ML: 5; 2.5; 8; 8; 2.5 SUSPENSION INTRAMUSCULAR at 16:17

## 2023-06-11 NOTE — ED PROVIDER NOTES
MD ATTESTATION NOTE    The MARIKA and I have discussed this patient's history, physical exam, and treatment plan.    I provided a substantive portion of the care of this patient. I personally performed the physical exam, in its entirety. The attached note describes my personal findings.      Ann-Marie Eaton is a 63 y.o. female who presents to the ED c/o finger laceration to the left thumb.  This was sustained by a butter knife just prior to arrival.      On exam:  GENERAL: not distressed  HENT: nares patent  EYES: no scleral icterus  CV: regular rhythm, regular rate  RESPIRATORY: normal effort  MUSCULOSKELETAL: no deformity  NEURO: alert, moves all extremities, follows commands  SKIN: Tiny less than pea-sized laceration to the tip of the left thumb.  Good hemostasis.  This is superficial and not amenable to primary closure    Labs  No results found for this or any previous visit (from the past 24 hour(s)).    Radiology  No Radiology Exams Resulted Within Past 24 Hours    Medications given in the ED:  Medications   Tetanus-Diphth-Acell Pertussis (BOOSTRIX) injection 0.5 mL (has no administration in time range)       Orders placed during this visit:  Orders Placed This Encounter   Procedures    Wound Care       Medical Decision Making:             PPE: Both the patient and I wore a surgical mask throughout the entire patient encounter.     Diagnosis  Final diagnoses:   Laceration of skin of left thumb, initial encounter   Need for Tdap vaccination          Russ Zapata II, MD  06/11/23 2451

## 2023-06-11 NOTE — ED NOTES
Pt to er via pv. Pt states she was cutting chicken and sliced her thumb with a knife. Pt not on blood thinners. Thumb wrapped in triage with bleeding controlled.

## 2023-06-11 NOTE — ED PROVIDER NOTES
EMERGENCY DEPARTMENT ENCOUNTER    Room Number:  05/05  Date of encounter:  6/11/2023  PCP: John Cowan MD  Patient Care Team:  John Cowan MD as PCP - General (Internal Medicine)  John Cowan MD as PCP - Internal Medicine  Gildardo Goode MD as Consulting Physician (Urology)   Independent Historians: Patient    HPI:  Chief Complaint: Finger laceration  A complete HPI/ROS/PMH/PSH/SH/FH are unobtainable due to: None    Chronic or social conditions impacting patient care (social determinants of health): None    Context: Ann-Marie Eaton is a 63 y.o. female who presents to the ED c/o left thumb laceration that occurred prior to arrival while cutting chicken.  Last tetanus shot was in 2013.  Does not take any blood thinners.  No weakness or numbness to the affected digit.    Review of prior external notes (non-ED): Office visit with gastroenterology NP Dale on 5/11/23.  Patient has history of GERD, diarrhea.  Currently taking famotidine 20 mg twice daily.  Recommended to avoid NSAIDs and to take Tylenol as needed for pain.  Recommended to stop Metamucil and Imodium and started on colestipol.    Last tdap appears to have been on 9/3/2013.    Review of prior external test results outside of this encounter: CMP from 5/1/2023 shows mild hypokalemia 3.2, normal renal function, normal LFTs.  CBC shows normal hemoglobin, platelets 211.    PAST MEDICAL HISTORY  Active Ambulatory Problems     Diagnosis Date Noted    Osteoarthritis, knee 04/18/2016    Hypertension 07/11/2016    SHANNAN (obstructive sleep apnea) 07/11/2016    IBS (irritable bowel syndrome) 07/11/2016    Fatty liver disease, nonalcoholic 07/11/2016    Vitamin D deficiency 07/11/2016    DDD (degenerative disc disease), lumbosacral 07/11/2016    Hyperlipidemia 07/11/2016    Kidney stone 07/11/2016    Depression 09/15/2016    Anxiety, generalized 10/06/2016    GERD (gastroesophageal reflux disease) 10/06/2016    Splenomegaly 05/01/2023     Resolved  Ambulatory Problems     Diagnosis Date Noted    Gastric reflux 2016    Submandibular space infection 2018    Dental will-implantitis 2018    Leukocytosis 2018     Past Medical History:   Diagnosis Date    Anxiety     DDD (degenerative disc disease), lumbar     Eczema     History of concussion     HLD (hyperlipidemia)     HTN (hypertension)     Osteoarthritis of right knee     Urinary incontinence in female     Urolithiasis        The patient has started, but not completed, their COVID-19 vaccination series.    PAST SURGICAL HISTORY  Past Surgical History:   Procedure Laterality Date    APPENDECTOMY      CHOLECYSTECTOMY      CYSTOSCOPY W/ LASER LITHOTRIPSY  2014    EXTRACORPOREAL SHOCK WAVE LITHOTRIPSY (ESWL)      STENT  X2    HI ARTHRP KNE CONDYLE&PLATU MEDIAL&LAT COMPARTMENTS Left 2016    Procedure: LT TOTAL KNEE ARTHROPLASTY;  Surgeon: Cam Ryan MD;  Location: [facility];  Service: Orthopedics    TONSILLECTOMY      TOTAL KNEE ARTHROPLASTY Right 2019    Procedure: RIGHT TOTAL KNEE ARTHROPLASTY;  Surgeon: Cam Ryan MD;  Location: [facility];  Service: Orthopedics    URETEROTOMY  2014    R Stent- stone         FAMILY HISTORY  Family History   Problem Relation Age of Onset    Lung cancer Mother     Aneurysm Father     COPD Father     No Known Problems Brother     No Known Problems Daughter     No Known Problems Sister     Malig Hyperthermia Neg Hx          SOCIAL HISTORY  Social History     Socioeconomic History    Marital status:    Tobacco Use    Smoking status: Former     Packs/day: 1.00     Years: 15.00     Pack years: 15.00     Types: Cigarettes     Quit date:      Years since quittin.4    Smokeless tobacco: Never   Vaping Use    Vaping Use: Never used   Substance and Sexual Activity    Alcohol use: Yes     Alcohol/week: 0.0 - 1.0 standard drinks     Comment: 1 MONTHLY    Drug use: No    Sexual activity: Defer          ALLERGIES  Percocet [oxycodone-acetaminophen], Darvon [propoxyphene], Grass, Other, Pollen extract, Strawberry extract, Sulfa antibiotics, and Tree extract        REVIEW OF SYSTEMS  Review of Systems   Constitutional:  Negative for chills and fever.   Skin:  Positive for wound (lac L thumb).   Neurological:  Negative for weakness.      All systems reviewed and negative except for those discussed in HPI.       PHYSICAL EXAM    I have reviewed the triage vital signs and nursing notes.    ED Triage Vitals   Temp Heart Rate Resp BP SpO2   06/11/23 1440 06/11/23 1440 06/11/23 1440 06/11/23 1442 06/11/23 1440   97.6 °F (36.4 °C) 94 17 157/89 96 %      Temp src Heart Rate Source Patient Position BP Location FiO2 (%)   -- -- -- -- --              Physical Exam  GENERAL: alert, no acute distress  SKIN: Warm, dry, small superficial laceration to pad of left thumb, no active bleeding, no nail involvement  HENT: Normocephalic, atraumatic  EYES: no scleral icterus  CV: regular rhythm, regular rate  RESPIRATORY: normal effort, lungs clear  ABDOMEN: soft, nontender, nondistended  MUSCULOSKELETAL: no deformity, normal ROM of affected digit  NEURO: alert, moves all extremities, follows commands          LAB RESULTS  No results found for this or any previous visit (from the past 24 hour(s)).    Ordered the above labs and independently reviewed and interpreted the results.        RADIOLOGY  No Radiology Exams Resulted Within Past 24 Hours    I ordered the above noted radiological studies. Independently reviewed and interpreted by me.  See dictation for official radiology interpretation.      PROCEDURES    Procedures      MEDICATIONS GIVEN IN ER    Medications   Tetanus-Diphth-Acell Pertussis (BOOSTRIX) injection 0.5 mL (0.5 mL Intramuscular Given 6/11/23 1617)         PROGRESS, DATA ANALYSIS, CONSULTS, AND MEDICAL DECISION MAKING    All labs have been independently reviewed and interpreted by me.  All radiology studies have  been independently reviewed and interpreted by me and discussed with radiologist dictating the report.   EKG's independently reviewed and interpreted by me.  Discussion below represents my analysis of pertinent findings related to patient's condition, differential diagnosis, treatment plan and final disposition.    Differential diagnosis: laceration, fb, tendon injury     Small superficial cut to pad of L thumb. Does not require suture repair. Wound cleansed, bacitracin and bandage placed. Tdap updated.        PPE: Patient was placed in face mask in first look. Patient was wearing facemask when I entered the room and throughout our encounter. I wore full protective equipment throughout this patient encounter including a face mask, and gloves. Hand hygiene was performed before donning protective equipment and after removal when leaving the room.        AS OF 16:22 EDT VITALS:    BP - 157/89  HR - 94  TEMP - 97.6 °F (36.4 °C)  O2 SATS - 96%        DIAGNOSIS  Final diagnoses:   Laceration of skin of left thumb, initial encounter   Need for Tdap vaccination         DISPOSITION  ED Disposition       ED Disposition   Discharge    Condition   Stable    Comment   --                  Note Disclaimer: At [facility], we believe that sharing information builds trust and better relationships. You are receiving this note because you recently visited [facility]. It is possible you will see health information before a provider has talked with you about it. This kind of information can be easy to misunderstand. To help you fully understand what it means for your health, we urge you to discuss this note with your provider.         Karon Dela Cruz PA  06/11/23 [id number]

## 2023-06-19 ENCOUNTER — OFFICE VISIT (OUTPATIENT)
Dept: INTERNAL MEDICINE | Facility: CLINIC | Age: 63
End: 2023-06-19
Payer: COMMERCIAL

## 2023-06-19 VITALS
TEMPERATURE: 98 F | OXYGEN SATURATION: 97 % | RESPIRATION RATE: 16 BRPM | HEART RATE: 96 BPM | HEIGHT: 63 IN | SYSTOLIC BLOOD PRESSURE: 122 MMHG | DIASTOLIC BLOOD PRESSURE: 82 MMHG | WEIGHT: 236 LBS | BODY MASS INDEX: 41.82 KG/M2

## 2023-06-19 DIAGNOSIS — Z23 NEED FOR VACCINATION: ICD-10-CM

## 2023-06-19 DIAGNOSIS — Z12.31 SCREENING MAMMOGRAM, ENCOUNTER FOR: ICD-10-CM

## 2023-06-19 DIAGNOSIS — I10 PRIMARY HYPERTENSION: Chronic | ICD-10-CM

## 2023-06-19 DIAGNOSIS — K21.9 GASTROESOPHAGEAL REFLUX DISEASE, UNSPECIFIED WHETHER ESOPHAGITIS PRESENT: Chronic | ICD-10-CM

## 2023-06-19 DIAGNOSIS — E78.5 HYPERLIPIDEMIA, UNSPECIFIED HYPERLIPIDEMIA TYPE: Chronic | ICD-10-CM

## 2023-06-19 LAB
CHOLEST SERPL-MCNC: 195 MG/DL (ref 0–200)
HDLC SERPL QL: 4.53
HDLC SERPL-MCNC: 43 MG/DL (ref 40–60)
LDLC SERPL CALC-MCNC: 126 MG/DL (ref 0–100)
TRIGL SERPL-MCNC: 143 MG/DL (ref 0–150)
VLDLC SERPL-MCNC: 26 MG/DL (ref 5–40)

## 2023-06-19 PROCEDURE — 99214 OFFICE O/P EST MOD 30 MIN: CPT | Performed by: INTERNAL MEDICINE

## 2023-06-19 PROCEDURE — 36415 COLL VENOUS BLD VENIPUNCTURE: CPT | Performed by: INTERNAL MEDICINE

## 2023-06-19 PROCEDURE — 90471 IMMUNIZATION ADMIN: CPT | Performed by: INTERNAL MEDICINE

## 2023-06-19 PROCEDURE — 90677 PCV20 VACCINE IM: CPT | Performed by: INTERNAL MEDICINE

## 2023-06-19 NOTE — PROGRESS NOTES
Subjective   Ann-Marie Eaton is a 63 y.o. female.     Chief Complaint   Patient presents with    Hypertension    Hyperlipidemia    Irritable Bowel Syndrome         Hypertension  This is a chronic problem. The current episode started more than 1 year ago. The problem is unchanged. The problem is controlled. Associated symptoms include peripheral edema. Pertinent negatives include no chest pain, headaches, orthopnea, palpitations or shortness of breath.   Hyperlipidemia  This is a chronic problem. The current episode started more than 1 year ago. The problem is controlled. Recent lipid tests were reviewed and are normal. Pertinent negatives include no chest pain or shortness of breath.   Irritable Bowel Syndrome  This is a chronic problem. Pertinent negatives include no abdominal pain ( GERD), chest pain or headaches.   Heartburn  She reports no abdominal pain ( GERD), no chest pain or no wheezing. This is a chronic problem. The current episode started more than 1 year ago. The problem occurs occasionally. The problem has been unchanged. She has tried a histamine-2 antagonist for the symptoms. The treatment provided significant relief.      The following portions of the patient's history were reviewed and updated as appropriate: allergies, current medications, past social history and problem list.    Outpatient Medications Marked as Taking for the 6/19/23 encounter (Office Visit) with John Cowan MD   Medication Sig Dispense Refill    albuterol sulfate  (90 Base) MCG/ACT inhaler Inhale 2 puffs Every 4 (Four) Hours As Needed for Wheezing. 18 g 0    atorvastatin (LIPITOR) 20 MG tablet TAKE 1 TABLET BY MOUTH DAILY 90 tablet 1    colestipol (COLESTID) 1 g tablet TAKE 1 TABLET ONCE TO TWICE DAILY PRN DIARRHEA AND STOOL URGENCY      dicyclomine (BENTYL) 10 MG capsule TAKE 1 CAPSULE BY MOUTH FOUR TIMES DAILY BEFORE MEALS AND AT BEDTIME 60 capsule 3    ezetimibe (ZETIA) 10 MG tablet TAKE 1 TABLET BY MOUTH DAILY 90  tablet 1    famotidine (PEPCID) 20 MG tablet Take 1 tablet by mouth 2 (Two) Times a Day.      hydroCHLOROthiazide (MICROZIDE) 12.5 MG capsule TAKE 1 CAPSULE BY MOUTH DAILY 90 capsule 1    lisinopril (PRINIVIL,ZESTRIL) 10 MG tablet TAKE 1 TABLET BY MOUTH DAILY 90 tablet 1    loperamide (IMODIUM) 2 MG capsule Take 4 capsules by mouth As Needed for Diarrhea.      multivitamin with minerals tablet tablet Take 1 tablet by mouth Daily.      naproxen sodium (ALEVE) 220 MG tablet Take 1 tablet by mouth 2 (Two) Times a Day As Needed.      potassium chloride 10 MEQ CR tablet Take 1 tablet by mouth 2 (Two) Times a Day. 60 tablet 2    Psyllium (Metamucil) wafer wafer Take  by mouth Daily.      sertraline (ZOLOFT) 100 MG tablet TAKE 1 AND 1/2 TABLETS BY MOUTH DAILY 135 tablet 1    triamcinolone (KENALOG) 0.5 % cream Apply to affected area(s) 2 times a day 30 g 2    valACYclovir (VALTREX) 500 MG tablet TAKE 1 TABLET BY MOUTH DAILY 90 tablet 1    vitamin D (ERGOCALCIFEROL) 1.25 MG (12085 UT) capsule capsule TAKE 1 CAPSULE BY MOUTH 1 TIME A WEEK 13 capsule 1    Xifaxan 550 MG tablet          Review of Systems   Respiratory:  Negative for shortness of breath and wheezing.    Cardiovascular:  Negative for chest pain, palpitations, orthopnea and leg swelling.   Gastrointestinal:  Negative for abdominal pain ( GERD), constipation (controlled with bentyl) and diarrhea (controlled with colestid).   Neurological:  Negative for headaches.     Objective   Vitals:    06/19/23 1302   BP: 122/82   Pulse: 96   Resp: 16   Temp: 98 °F (36.7 °C)   SpO2: 97%          06/19/23  1302   Weight: 107 kg (236 lb)    [unfilled]  Body mass index is 41.82 kg/m².      Physical Exam   Constitutional: She appears well-developed.   Cardiovascular: Normal rate, regular rhythm and normal heart sounds. Exam reveals no gallop.   No murmur heard.  Pulmonary/Chest: Effort normal and breath sounds normal. No respiratory distress. She has no wheezes. She has no  rales.   Abdominal: Soft. Normal appearance and bowel sounds are normal. She exhibits no mass. There is no abdominal tenderness. There is no guarding.   Neurological: She is alert.       Problems Addressed this Visit          Cardiac and Vasculature    Hypertension - Primary (Chronic)    Hyperlipidemia (Chronic)       Gastrointestinal Abdominal     GERD (gastroesophageal reflux disease) (Chronic)     Diagnoses         Codes Comments    Primary hypertension    -  Primary ICD-10-CM: I10  ICD-9-CM: 401.9     Hyperlipidemia, unspecified hyperlipidemia type     ICD-10-CM: E78.5  ICD-9-CM: 272.4     Gastroesophageal reflux disease, unspecified whether esophagitis present     ICD-10-CM: K21.9  ICD-9-CM: 530.81           Assessment & Plan   In for recheck of hypertension, hyperlipidemia, IBS and GERD today March 2023.  Has diarrhea predominant irritable bowel which is doing pretty well overall.  Currently controlled with Colestid.  Off of Metamucil.  Off of probiotics.  Bentyl is controlling her constipation.  Annual labs August 2022 including CBC, CMP, lipids, UA.  Lipids q 3 mos. Sertraline dose is back to 150 mg daily.  Depression is better now.  Remains on Naprosyn 225 mg daily for arthritis.  Remains on Zetia 10 mg daily and Lipitor 20 mg daily for hyperlipidemia and those are reviewed today.  Annual preventive exam next visit December 2024.  Currently working from home but IBS is doing so well she is considering trying downtown work again.  Prevnar updated today.  Due for mammogram.  She is 30 minutes PP today.    The above information was reviewed again today 06/19/23.  It continues to be accurate as reflected above and is unchanged.  History, physical and review of systems all reviewed and are unchanged.  Medications were reviewed today and continue the current dosing.           Shree disclaimer:   Much of this encounter note is an electronic transcription/translation of spoken language to printed text. The  electronic translation of spoken language may permit erroneous, or at times, nonsensical words or phrases to be inadvertently transcribed; Although I have reviewed the note for such errors, some may still exist.

## 2023-08-22 ENCOUNTER — OFFICE VISIT (OUTPATIENT)
Dept: INTERNAL MEDICINE | Facility: CLINIC | Age: 63
End: 2023-08-22
Payer: COMMERCIAL

## 2023-08-22 VITALS
BODY MASS INDEX: 44.83 KG/M2 | OXYGEN SATURATION: 97 % | DIASTOLIC BLOOD PRESSURE: 82 MMHG | SYSTOLIC BLOOD PRESSURE: 128 MMHG | RESPIRATION RATE: 16 BRPM | HEIGHT: 63 IN | HEART RATE: 85 BPM | WEIGHT: 253 LBS | TEMPERATURE: 98.7 F

## 2023-08-22 DIAGNOSIS — M54.32 SCIATICA OF LEFT SIDE: Primary | ICD-10-CM

## 2023-08-22 PROCEDURE — 99213 OFFICE O/P EST LOW 20 MIN: CPT | Performed by: INTERNAL MEDICINE

## 2023-08-22 RX ORDER — PREDNISONE 5 MG/1
TABLET ORAL
Qty: 42 TABLET | Refills: 0 | Status: SHIPPED | OUTPATIENT
Start: 2023-08-22

## 2023-08-22 NOTE — PROGRESS NOTES
Subjective   Ann-Marie Eaton is a 63 y.o. female.     Chief Complaint   Patient presents with    Leg Pain     Buttock pain x 2 wks    Numbness     Left leg    Tingling     Left toes         Leg Pain   The incident occurred more than 1 week ago. There was no injury mechanism. The pain is present in the left thigh. The quality of the pain is described as aching and cramping. The pain is severe. The pain has been Fluctuating since onset. Associated symptoms include numbness and tingling. Pertinent negatives include no inability to bear weight or muscle weakness.      The following portions of the patient's history were reviewed and updated as appropriate: allergies, current medications, past social history and problem list.    Outpatient Medications Marked as Taking for the 8/22/23 encounter (Office Visit) with John Cowan MD   Medication Sig Dispense Refill    albuterol sulfate  (90 Base) MCG/ACT inhaler Inhale 2 puffs Every 4 (Four) Hours As Needed for Wheezing. 18 g 0    atorvastatin (LIPITOR) 20 MG tablet TAKE 1 TABLET BY MOUTH DAILY 90 tablet 1    colestipol (COLESTID) 1 g tablet TAKE 1 TABLET ONCE TO TWICE DAILY PRN DIARRHEA AND STOOL URGENCY      dicyclomine (BENTYL) 10 MG capsule TAKE 1 CAPSULE BY MOUTH FOUR TIMES DAILY BEFORE MEALS AND AT BEDTIME 60 capsule 3    ezetimibe (ZETIA) 10 MG tablet TAKE 1 TABLET BY MOUTH DAILY 90 tablet 1    famotidine (PEPCID) 20 MG tablet Take 1 tablet by mouth 2 (Two) Times a Day.      hydroCHLOROthiazide (MICROZIDE) 12.5 MG capsule TAKE 1 CAPSULE BY MOUTH DAILY 90 capsule 1    lisinopril (PRINIVIL,ZESTRIL) 10 MG tablet TAKE 1 TABLET BY MOUTH DAILY 90 tablet 1    loperamide (IMODIUM) 2 MG capsule Take 4 capsules by mouth As Needed for Diarrhea.      multivitamin with minerals tablet tablet Take 1 tablet by mouth Daily.      naproxen sodium (ALEVE) 220 MG tablet Take 1 tablet by mouth 2 (Two) Times a Day As Needed.      potassium chloride 10 MEQ CR tablet Take 1  tablet by mouth 2 (Two) Times a Day. 60 tablet 2    Psyllium (Metamucil) wafer wafer Take  by mouth Daily.      sertraline (ZOLOFT) 100 MG tablet TAKE 1 AND 1/2 TABLETS BY MOUTH DAILY 135 tablet 1    triamcinolone (KENALOG) 0.5 % cream Apply to affected area(s) 2 times a day 30 g 2    valACYclovir (VALTREX) 500 MG tablet TAKE 1 TABLET BY MOUTH DAILY 90 tablet 1    vitamin D (ERGOCALCIFEROL) 1.25 MG (29527 UT) capsule capsule TAKE 1 CAPSULE BY MOUTH 1 TIME A WEEK 13 capsule 1       Review of Systems   Constitutional:  Negative for chills and fever.   Musculoskeletal:  Negative for back pain.   Neurological:  Positive for tingling and numbness.     Objective   Vitals:    08/22/23 1414   BP: 128/82   Pulse: 85   Resp: 16   Temp: 98.7 øF (37.1 øC)   SpO2: 97%      Wt Readings from Last 3 Encounters:   08/22/23 115 kg (253 lb)   06/19/23 107 kg (236 lb)   06/11/23 111 kg (245 lb)    Body mass index is 44.83 kg/mý.      Physical Exam  Constitutional:       Appearance: Normal appearance. She is well-developed.   Neurological:      General: No focal deficit present.      Mental Status: She is alert.      Motor: No weakness or abnormal muscle tone.      Deep Tendon Reflexes: Reflexes are normal and symmetric. Reflexes normal.         Problems Addressed this Visit    None  Visit Diagnoses       Sciatica of left side    -  Primary          Diagnoses         Codes Comments    Sciatica of left side    -  Primary ICD-10-CM: M54.32  ICD-9-CM: 724.3           Assessment & Plan   It with 2 weeks of pain in the left buttocks going down the left leg.  Numbness and tingling in the toes.  No weakness.  Still able to ambulate.  Firm chairs do better than soft chairs.  No back pain.  No prior history of back problems.  Symptoms are consistent with sciatica.  We will begin a 12-day Pred pack.  In the meantime she will avoid bending and lifting activities.  Strolling is okay.  No soft chairs or couches.  We will consider physical therapy  next and/or some plain films.  She will need a note for missing work last week Tuesday through Friday    The above information was reviewed again today 08/22/23.  It continues to be accurate as reflected above and is unchanged.  History, physical and review of systems all reviewed and are unchanged.  Medications were reviewed today and continue the current dosing.             Dragon disclaimer:   Part of this note may be an electronic transcription/translation of spoken language to printed text using the Dragon Dictation System.

## 2023-09-07 RX ORDER — VALACYCLOVIR HYDROCHLORIDE 500 MG/1
500 TABLET, FILM COATED ORAL DAILY
Qty: 90 TABLET | Refills: 1 | Status: SHIPPED | OUTPATIENT
Start: 2023-09-07

## 2023-09-19 ENCOUNTER — OFFICE VISIT (OUTPATIENT)
Dept: INTERNAL MEDICINE | Facility: CLINIC | Age: 63
End: 2023-09-19
Payer: COMMERCIAL

## 2023-09-19 ENCOUNTER — LAB (OUTPATIENT)
Dept: LAB | Facility: HOSPITAL | Age: 63
End: 2023-09-19
Payer: COMMERCIAL

## 2023-09-19 VITALS
RESPIRATION RATE: 16 BRPM | TEMPERATURE: 97.8 F | OXYGEN SATURATION: 94 % | BODY MASS INDEX: 45.54 KG/M2 | HEIGHT: 63 IN | DIASTOLIC BLOOD PRESSURE: 74 MMHG | HEART RATE: 77 BPM | WEIGHT: 257 LBS | SYSTOLIC BLOOD PRESSURE: 118 MMHG

## 2023-09-19 DIAGNOSIS — I10 PRIMARY HYPERTENSION: Primary | Chronic | ICD-10-CM

## 2023-09-19 DIAGNOSIS — K58.0 IRRITABLE BOWEL SYNDROME WITH DIARRHEA: Chronic | ICD-10-CM

## 2023-09-19 DIAGNOSIS — E78.5 HYPERLIPIDEMIA, UNSPECIFIED HYPERLIPIDEMIA TYPE: Chronic | ICD-10-CM

## 2023-09-19 LAB
CHOLEST SERPL-MCNC: 200 MG/DL (ref 0–200)
HDLC SERPL QL: 4.35
HDLC SERPL-MCNC: 46 MG/DL (ref 40–60)
LDLC SERPL CALC-MCNC: 126 MG/DL (ref 0–100)
TRIGL SERPL-MCNC: 155 MG/DL (ref 0–150)
VLDLC SERPL-MCNC: 28 MG/DL (ref 5–40)

## 2023-09-19 PROCEDURE — 36415 COLL VENOUS BLD VENIPUNCTURE: CPT | Performed by: INTERNAL MEDICINE

## 2023-09-19 PROCEDURE — 80061 LIPID PANEL: CPT | Performed by: INTERNAL MEDICINE

## 2023-09-19 PROCEDURE — 99214 OFFICE O/P EST MOD 30 MIN: CPT | Performed by: INTERNAL MEDICINE

## 2023-10-30 RX ORDER — POTASSIUM CHLORIDE 750 MG/1
10 TABLET, FILM COATED, EXTENDED RELEASE ORAL 2 TIMES DAILY
Qty: 60 TABLET | Refills: 2 | Status: SHIPPED | OUTPATIENT
Start: 2023-10-30

## 2023-10-30 RX ORDER — ERGOCALCIFEROL 1.25 MG/1
CAPSULE ORAL
Qty: 13 CAPSULE | Refills: 1 | Status: SHIPPED | OUTPATIENT
Start: 2023-10-30

## 2023-12-04 RX ORDER — LISINOPRIL 10 MG/1
10 TABLET ORAL DAILY
Qty: 90 TABLET | Refills: 1 | Status: SHIPPED | OUTPATIENT
Start: 2023-12-04

## 2023-12-06 RX ORDER — SERTRALINE HYDROCHLORIDE 100 MG/1
TABLET, FILM COATED ORAL
Qty: 135 TABLET | Refills: 1 | Status: SHIPPED | OUTPATIENT
Start: 2023-12-06

## 2023-12-06 RX ORDER — EZETIMIBE 10 MG/1
10 TABLET ORAL DAILY
Qty: 90 TABLET | Refills: 1 | Status: SHIPPED | OUTPATIENT
Start: 2023-12-06

## 2023-12-06 RX ORDER — HYDROCHLOROTHIAZIDE 12.5 MG/1
12.5 CAPSULE, GELATIN COATED ORAL DAILY
Qty: 90 CAPSULE | Refills: 1 | Status: SHIPPED | OUTPATIENT
Start: 2023-12-06

## 2024-01-22 ENCOUNTER — OFFICE VISIT (OUTPATIENT)
Dept: INTERNAL MEDICINE | Facility: CLINIC | Age: 64
End: 2024-01-22
Payer: COMMERCIAL

## 2024-01-22 VITALS
HEIGHT: 64 IN | WEIGHT: 258 LBS | BODY MASS INDEX: 44.05 KG/M2 | TEMPERATURE: 99 F | DIASTOLIC BLOOD PRESSURE: 74 MMHG | SYSTOLIC BLOOD PRESSURE: 120 MMHG

## 2024-01-22 DIAGNOSIS — I10 PRIMARY HYPERTENSION: Chronic | ICD-10-CM

## 2024-01-22 DIAGNOSIS — Z00.00 ENCOUNTER FOR PREVENTIVE HEALTH EXAMINATION: Primary | ICD-10-CM

## 2024-01-22 DIAGNOSIS — Z12.31 SCREENING MAMMOGRAM, ENCOUNTER FOR: ICD-10-CM

## 2024-01-22 DIAGNOSIS — E78.5 HYPERLIPIDEMIA, UNSPECIFIED HYPERLIPIDEMIA TYPE: Chronic | ICD-10-CM

## 2024-01-22 PROCEDURE — 99397 PER PM REEVAL EST PAT 65+ YR: CPT | Performed by: INTERNAL MEDICINE

## 2024-01-22 NOTE — PROGRESS NOTES
Subjective   Ann-Marie Eaton is a 64 y.o. female.     Chief Complaint   Patient presents with    Annual Exam         History of Present Illness  In for annual preventative exam.  Sleeps 4 hours per night.  No exercise.  Energy is low.  Diet is so so.       The following portions of the patient's history were reviewed and updated as appropriate: allergies, current medications, past social history and problem list.    HISTORY  Outpatient Medications Marked as Taking for the 1/22/24 encounter (Office Visit) with John Cowan MD   Medication Sig Dispense Refill    albuterol sulfate  (90 Base) MCG/ACT inhaler Inhale 2 puffs Every 4 (Four) Hours As Needed for Wheezing. 18 g 0    atorvastatin (LIPITOR) 20 MG tablet TAKE 1 TABLET BY MOUTH DAILY 90 tablet 1    colestipol (COLESTID) 1 g tablet TAKE 1 TABLET ONCE TO TWICE DAILY PRN DIARRHEA AND STOOL URGENCY      dicyclomine (BENTYL) 10 MG capsule TAKE 1 CAPSULE BY MOUTH FOUR TIMES DAILY BEFORE MEALS AND AT BEDTIME 60 capsule 3    ezetimibe (ZETIA) 10 MG tablet TAKE 1 TABLET BY MOUTH DAILY 90 tablet 1    famotidine (PEPCID) 20 MG tablet Take 1 tablet by mouth 2 (Two) Times a Day.      hydroCHLOROthiazide (MICROZIDE) 12.5 MG capsule TAKE 1 CAPSULE BY MOUTH DAILY 90 capsule 1    lisinopril (PRINIVIL,ZESTRIL) 10 MG tablet TAKE 1 TABLET BY MOUTH DAILY 90 tablet 1    multivitamin with minerals tablet tablet Take 1 tablet by mouth Daily.      naproxen sodium (ALEVE) 220 MG tablet Take 1 tablet by mouth 2 (Two) Times a Day As Needed.      potassium chloride 10 MEQ CR tablet TAKE 1 TABLET BY MOUTH TWICE DAILY 60 tablet 2    sertraline (ZOLOFT) 100 MG tablet TAKE 1 AND 1/2 TABLETS BY MOUTH DAILY 135 tablet 1    valACYclovir (VALTREX) 500 MG tablet TAKE 1 TABLET BY MOUTH DAILY 90 tablet 1    vitamin D (ERGOCALCIFEROL) 1.25 MG (36624 UT) capsule capsule TAKE 1 CAPSULE BY MOUTH 1 TIME A WEEK 13 capsule 1     Social History     Socioeconomic History    Marital status:     Tobacco Use    Smoking status: Former     Packs/day: 1.00     Years: 15.00     Additional pack years: 0.00     Total pack years: 15.00     Types: Cigarettes     Quit date:      Years since quittin.0     Passive exposure: Past    Smokeless tobacco: Never   Vaping Use    Vaping Use: Never used   Substance and Sexual Activity    Alcohol use: Yes     Alcohol/week: 0.0 - 1.0 standard drinks of alcohol     Comment: 1 MONTHLY    Drug use: No    Sexual activity: Defer     Family History   Problem Relation Age of Onset    Lung cancer Mother     Aneurysm Father     COPD Father     No Known Problems Brother     No Known Problems Daughter     No Known Problems Sister     Malig Hyperthermia Neg Hx      Past Medical History:   Diagnosis Date    Anxiety     DDD (degenerative disc disease), lumbar     Eczema     Fatty liver disease, nonalcoholic     GERD (gastroesophageal reflux disease)     History of concussion     HLD (hyperlipidemia)     HTN (hypertension)     IBS (irritable bowel syndrome)     SHANNAN (obstructive sleep apnea)     NO MACHINE    Osteoarthritis of right knee     Urinary incontinence in female     WEARS PADS    Urolithiasis     Vitamin D deficiency      Past Surgical History:   Procedure Laterality Date    APPENDECTOMY      CHOLECYSTECTOMY      CYSTOSCOPY W/ LASER LITHOTRIPSY  2014    EXTRACORPOREAL SHOCK WAVE LITHOTRIPSY (ESWL)      STENT  X2    OR ARTHRP KNE CONDYLE&PLATU MEDIAL&LAT COMPARTMENTS Left 2016    Procedure: LT TOTAL KNEE ARTHROPLASTY;  Surgeon: Cam Ryan MD;  [facility];  Service: Orthopedics    TONSILLECTOMY      TOTAL KNEE ARTHROPLASTY Right 2019    Procedure: RIGHT TOTAL KNEE ARTHROPLASTY;  Surgeon: Cam Ryan MD;  Location: [facility];  Service: Orthopedics    URETEROTOMY  2014    R Stent- stone       Review of Systems   Constitutional:  Negative for appetite change, chills, diaphoresis, fatigue, fever and unexpected weight change.    Respiratory:  Positive for wheezing. Negative for cough, chest tightness and shortness of breath.    Cardiovascular:  Negative for chest pain, palpitations and leg swelling.   Gastrointestinal:  Positive for abdominal pain (GERD controlled with pepcid). Negative for anal bleeding, blood in stool, constipation, diarrhea, nausea ( spells), rectal pain and vomiting.   Endocrine: Negative for cold intolerance, heat intolerance and polyuria.   Genitourinary:  Negative for difficulty urinating, dysuria, flank pain, frequency, hematuria and urgency.   Musculoskeletal:  Positive for arthralgias (right knee and hands) and back pain. Negative for myalgias.   Allergic/Immunologic: Positive for environmental allergies.   Neurological:  Positive for headaches. Negative for dizziness, syncope, speech difficulty, weakness, light-headedness and numbness.   Hematological:  Does not bruise/bleed easily.   Psychiatric/Behavioral:  Positive for dysphoric mood. Negative for agitation, confusion and sleep disturbance. The patient is not nervous/anxious.        Objective   Vitals:    01/22/24 1411   BP: 120/74   Temp: 99 °F (37.2 °C)          01/22/24  1411   Weight: 117 kg (258 lb)    [unfilled]  Body mass index is 44.29 kg/m².      Physical Exam   Constitutional: She is oriented to person, place, and time. She appears well-developed.   HENT:   Head: Normocephalic and atraumatic.   Right Ear: Tympanic membrane and external ear normal.   Left Ear: Tympanic membrane and external ear normal.   Nose: Nose normal.   Eyes: Pupils are equal, round, and reactive to light. Conjunctivae are normal.   Neck: No JVD present. No thyromegaly present.   Cardiovascular: Normal rate, regular rhythm and normal heart sounds. Exam reveals no gallop.   No murmur heard.  Pulmonary/Chest: Effort normal and breath sounds normal. No respiratory distress. She has no wheezes. She has no rales. Right breast exhibits no mass, no skin change and no tenderness.  Left breast exhibits no mass, no skin change and no tenderness.   Chaperone present   Abdominal: Soft. Normal appearance and bowel sounds are normal. She exhibits no distension and no mass. There is no abdominal tenderness. There is no guarding. No hernia.   Musculoskeletal: Normal range of motion.   Lymphadenopathy:     She has no cervical adenopathy.   Neurological: She is alert and oriented to person, place, and time. She displays normal reflexes. No cranial nerve deficit. Coordination normal.   Skin: Skin is warm and dry.   Psychiatric: Her behavior is normal. Mood, judgment and thought content normal.   Nursing note and vitals reviewed.        Problems Addressed this Visit          Cardiac and Vasculature    Hypertension (Chronic)    Hyperlipidemia (Chronic)     Other Visit Diagnoses       Encounter for preventive health examination    -  Primary          Diagnoses         Codes Comments    Encounter for preventive health examination    -  Primary ICD-10-CM: Z00.00  ICD-9-CM: V70.0     Primary hypertension     ICD-10-CM: I10  ICD-9-CM: 401.9     Hyperlipidemia, unspecified hyperlipidemia type     ICD-10-CM: E78.5  ICD-9-CM: 272.4           Assessment & Plan   In for annual preventive exam today January 2024.  Also in for in for recheck of htn, lipids, IBS, and Gerd.  Has diarrhea predominant irritable bowel which has been resolved on using her dicyclomine regularly now.  Off of fiber and Imodium.  Also boosted her sertraline dose to 150 mg previously.  Annual labs May 2023.  Lipids today and q 3 mos.  Depression is better now.  She is still due for mammogram.  She is 2 HPP today.      Prevention counseling was performed today. The counseling performed was routine health maintenance topics including BMI and exercise.    The above information was reviewed again today 01/22/24.  It continues to be accurate as reflected above and is unchanged.  History, physical and review of systems all reviewed and are unchanged.   Medications were reviewed today and continue the current dosing.           Shree disclaimer:   Much of this encounter note is an electronic transcription/translation of spoken language to printed text. The electronic translation of spoken language may permit erroneous, or at times, nonsensical words or phrases to be inadvertently transcribed; Although I have reviewed the note for such errors, some may still exist.

## 2024-02-29 ENCOUNTER — LAB (OUTPATIENT)
Dept: LAB | Facility: HOSPITAL | Age: 64
End: 2024-02-29
Payer: COMMERCIAL

## 2024-02-29 ENCOUNTER — HOSPITAL ENCOUNTER (OUTPATIENT)
Dept: MAMMOGRAPHY | Facility: HOSPITAL | Age: 64
Discharge: HOME | End: 2024-02-29
Payer: COMMERCIAL

## 2024-02-29 DIAGNOSIS — Z12.31 SCREENING MAMMOGRAM, ENCOUNTER FOR: ICD-10-CM

## 2024-02-29 LAB
CHOLEST SERPL-MCNC: 176 MG/DL (ref 0–200)
HDLC SERPL QL: 4.4
HDLC SERPL-MCNC: 40 MG/DL (ref 40–60)
LDLC SERPL CALC-MCNC: 112 MG/DL (ref 0–100)
TRIGL SERPL-MCNC: 135 MG/DL (ref 0–150)
VLDLC SERPL-MCNC: 24 MG/DL (ref 5–40)

## 2024-02-29 PROCEDURE — 77063 BREAST TOMOSYNTHESIS BI: CPT

## 2024-02-29 PROCEDURE — 80061 LIPID PANEL: CPT | Performed by: INTERNAL MEDICINE

## 2024-02-29 PROCEDURE — 77067 SCR MAMMO BI INCL CAD: CPT

## 2024-03-04 RX ORDER — VALACYCLOVIR HYDROCHLORIDE 500 MG/1
500 TABLET, FILM COATED ORAL DAILY
Qty: 90 TABLET | Refills: 1 | Status: SHIPPED | OUTPATIENT
Start: 2024-03-04

## 2024-04-15 ENCOUNTER — LAB (OUTPATIENT)
Dept: LAB | Facility: HOSPITAL | Age: 64
End: 2024-04-15
Payer: COMMERCIAL

## 2024-04-15 ENCOUNTER — OFFICE VISIT (OUTPATIENT)
Dept: INTERNAL MEDICINE | Facility: CLINIC | Age: 64
End: 2024-04-15
Payer: COMMERCIAL

## 2024-04-15 VITALS
HEART RATE: 82 BPM | BODY MASS INDEX: 43.02 KG/M2 | DIASTOLIC BLOOD PRESSURE: 78 MMHG | TEMPERATURE: 98 F | OXYGEN SATURATION: 95 % | RESPIRATION RATE: 18 BRPM | SYSTOLIC BLOOD PRESSURE: 130 MMHG | HEIGHT: 64 IN | WEIGHT: 252 LBS

## 2024-04-15 DIAGNOSIS — Z79.899 MEDICATION MANAGEMENT: ICD-10-CM

## 2024-04-15 DIAGNOSIS — E78.5 HYPERLIPIDEMIA, UNSPECIFIED HYPERLIPIDEMIA TYPE: Chronic | ICD-10-CM

## 2024-04-15 DIAGNOSIS — R73.9 HYPERGLYCEMIA: ICD-10-CM

## 2024-04-15 DIAGNOSIS — M12.812 ROTATOR CUFF ARTHROPATHY OF LEFT SHOULDER: ICD-10-CM

## 2024-04-15 DIAGNOSIS — I10 PRIMARY HYPERTENSION: Primary | Chronic | ICD-10-CM

## 2024-04-15 DIAGNOSIS — K21.9 GASTROESOPHAGEAL REFLUX DISEASE, UNSPECIFIED WHETHER ESOPHAGITIS PRESENT: Chronic | ICD-10-CM

## 2024-04-15 LAB
ALBUMIN SERPL-MCNC: 4.2 G/DL (ref 3.5–5.2)
ALBUMIN/GLOB SERPL: 1.7 G/DL
ALP SERPL-CCNC: 105 U/L (ref 39–117)
ALT SERPL W P-5'-P-CCNC: 26 U/L (ref 1–33)
ANION GAP SERPL CALCULATED.3IONS-SCNC: 11.5 MMOL/L (ref 5–15)
AST SERPL-CCNC: 24 U/L (ref 1–32)
BASOPHILS # BLD AUTO: 0.04 10*3/MM3 (ref 0–0.2)
BASOPHILS NFR BLD AUTO: 0.5 % (ref 0–1.5)
BILIRUB SERPL-MCNC: 0.6 MG/DL (ref 0–1.2)
BUN SERPL-MCNC: 12 MG/DL (ref 8–23)
BUN/CREAT SERPL: 16.9 (ref 7–25)
CALCIUM SPEC-SCNC: 9.5 MG/DL (ref 8.6–10.5)
CHLORIDE SERPL-SCNC: 102 MMOL/L (ref 98–107)
CHOLEST SERPL-MCNC: 187 MG/DL (ref 0–200)
CO2 SERPL-SCNC: 28.5 MMOL/L (ref 22–29)
CREAT SERPL-MCNC: 0.71 MG/DL (ref 0.57–1)
DEPRECATED RDW RBC AUTO: 44.1 FL (ref 37–54)
EGFRCR SERPLBLD CKD-EPI 2021: 93.9 ML/MIN/1.73
EOSINOPHIL # BLD AUTO: 0.27 10*3/MM3 (ref 0–0.4)
EOSINOPHIL NFR BLD AUTO: 3.5 % (ref 0.3–6.2)
ERYTHROCYTE [DISTWIDTH] IN BLOOD BY AUTOMATED COUNT: 11.8 % (ref 12.3–15.4)
GLOBULIN UR ELPH-MCNC: 2.5 GM/DL
GLUCOSE SERPL-MCNC: 104 MG/DL (ref 65–99)
HBA1C MFR BLD: 5.2 % (ref 4.8–5.6)
HCT VFR BLD AUTO: 40.5 % (ref 34–46.6)
HDLC SERPL QL: 4.25
HDLC SERPL-MCNC: 44 MG/DL (ref 40–60)
HGB BLD-MCNC: 13.7 G/DL (ref 12–15.9)
IMM GRANULOCYTES # BLD AUTO: 0.02 10*3/MM3 (ref 0–0.05)
IMM GRANULOCYTES NFR BLD AUTO: 0.3 % (ref 0–0.5)
LDLC SERPL CALC-MCNC: 117 MG/DL (ref 0–100)
LYMPHOCYTES # BLD AUTO: 1.42 10*3/MM3 (ref 0.7–3.1)
LYMPHOCYTES NFR BLD AUTO: 18.3 % (ref 19.6–45.3)
MCH RBC QN AUTO: 34.3 PG (ref 26.6–33)
MCHC RBC AUTO-ENTMCNC: 33.8 G/DL (ref 31.5–35.7)
MCV RBC AUTO: 101.3 FL (ref 79–97)
MONOCYTES # BLD AUTO: 0.54 10*3/MM3 (ref 0.1–0.9)
MONOCYTES NFR BLD AUTO: 6.9 % (ref 5–12)
NEUTROPHILS NFR BLD AUTO: 5.48 10*3/MM3 (ref 1.7–7)
NEUTROPHILS NFR BLD AUTO: 70.5 % (ref 42.7–76)
NRBC BLD AUTO-RTO: 0 /100 WBC (ref 0–0.2)
PLATELET # BLD AUTO: 209 10*3/MM3 (ref 140–450)
PMV BLD AUTO: 10 FL (ref 6–12)
POTASSIUM SERPL-SCNC: 3.7 MMOL/L (ref 3.5–5.2)
PROT SERPL-MCNC: 6.7 G/DL (ref 6–8.5)
RBC # BLD AUTO: 4 10*6/MM3 (ref 3.77–5.28)
SODIUM SERPL-SCNC: 142 MMOL/L (ref 136–145)
TRIGL SERPL-MCNC: 149 MG/DL (ref 0–150)
VLDLC SERPL-MCNC: 26 MG/DL (ref 5–40)
WBC NRBC COR # BLD AUTO: 7.77 10*3/MM3 (ref 3.4–10.8)

## 2024-04-15 PROCEDURE — 80053 COMPREHEN METABOLIC PANEL: CPT | Performed by: INTERNAL MEDICINE

## 2024-04-15 PROCEDURE — 83036 HEMOGLOBIN GLYCOSYLATED A1C: CPT | Performed by: INTERNAL MEDICINE

## 2024-04-15 PROCEDURE — 85025 COMPLETE CBC W/AUTO DIFF WBC: CPT | Performed by: INTERNAL MEDICINE

## 2024-04-15 PROCEDURE — 36415 COLL VENOUS BLD VENIPUNCTURE: CPT | Performed by: INTERNAL MEDICINE

## 2024-04-15 PROCEDURE — 99214 OFFICE O/P EST MOD 30 MIN: CPT | Performed by: INTERNAL MEDICINE

## 2024-04-15 PROCEDURE — 80061 LIPID PANEL: CPT | Performed by: INTERNAL MEDICINE

## 2024-04-15 NOTE — PROGRESS NOTES
Subjective   Ann-Marie Eaton is a 64 y.o. female.     Chief Complaint   Patient presents with    Hyperlipidemia    Hypertension    Heartburn    Lt. Shoulder Pain          Hyperlipidemia  This is a chronic problem. The current episode started more than 1 year ago. The problem is controlled. Recent lipid tests were reviewed and are normal. Pertinent negatives include no chest pain or shortness of breath.   Hypertension  This is a chronic problem. The current episode started more than 1 year ago. The problem is unchanged. The problem is controlled. Associated symptoms include headaches and peripheral edema. Pertinent negatives include no chest pain, orthopnea, palpitations or shortness of breath.   Heartburn  She reports no abdominal pain ( GERD), no chest pain or no wheezing. This is a chronic problem. The current episode started more than 1 year ago. The problem occurs occasionally. The problem has been unchanged. She has tried a histamine-2 antagonist for the symptoms. The treatment provided significant relief.   Irritable Bowel Syndrome  This is a chronic problem. Associated symptoms include headaches. Pertinent negatives include no abdominal pain ( GERD) or chest pain.        The following portions of the patient's history were reviewed and updated as appropriate: allergies, current medications, past social history and problem list.    Outpatient Medications Marked as Taking for the 4/15/24 encounter (Office Visit) with John Cowan MD   Medication Sig Dispense Refill    albuterol sulfate  (90 Base) MCG/ACT inhaler Inhale 2 puffs Every 4 (Four) Hours As Needed for Wheezing. 18 g 0    atorvastatin (LIPITOR) 20 MG tablet TAKE 1 TABLET BY MOUTH DAILY 90 tablet 1    colestipol (COLESTID) 1 g tablet TAKE 1 TABLET ONCE TO TWICE DAILY PRN DIARRHEA AND STOOL URGENCY      dicyclomine (BENTYL) 10 MG capsule TAKE 1 CAPSULE BY MOUTH FOUR TIMES DAILY BEFORE MEALS AND AT BEDTIME 60 capsule 3    ezetimibe (ZETIA) 10 MG  tablet TAKE 1 TABLET BY MOUTH DAILY 90 tablet 1    famotidine (PEPCID) 20 MG tablet Take 1 tablet by mouth 2 (Two) Times a Day.      hydroCHLOROthiazide (MICROZIDE) 12.5 MG capsule TAKE 1 CAPSULE BY MOUTH DAILY 90 capsule 1    lisinopril (PRINIVIL,ZESTRIL) 10 MG tablet TAKE 1 TABLET BY MOUTH DAILY 90 tablet 1    multivitamin with minerals tablet tablet Take 1 tablet by mouth Daily.      naproxen sodium (ALEVE) 220 MG tablet Take 1 tablet by mouth 2 (Two) Times a Day As Needed.      potassium chloride 10 MEQ CR tablet TAKE 1 TABLET BY MOUTH TWICE DAILY 60 tablet 2    sertraline (ZOLOFT) 100 MG tablet TAKE 1 AND 1/2 TABLETS BY MOUTH DAILY 135 tablet 1    valACYclovir (VALTREX) 500 MG tablet TAKE 1 TABLET BY MOUTH DAILY 90 tablet 1    vitamin D (ERGOCALCIFEROL) 1.25 MG (96866 UT) capsule capsule TAKE 1 CAPSULE BY MOUTH 1 TIME A WEEK 13 capsule 1       Review of Systems   Respiratory:  Negative for shortness of breath and wheezing.    Cardiovascular:  Negative for chest pain, palpitations, orthopnea and leg swelling.   Gastrointestinal:  Negative for abdominal pain ( GERD), constipation (controlled with bentyl) and diarrhea (controlled with colestid).   Neurological:  Positive for headaches.       Objective   Vitals:    04/15/24 1005   BP: 130/78   Pulse: 82   Resp: 18   Temp: 98 °F (36.7 °C)   SpO2: 95%          04/15/24  1005   Weight: 114 kg (252 lb)    [unfilled]  Body mass index is 43.26 kg/m².      Physical Exam   Constitutional: She appears well-developed.   Cardiovascular: Normal rate, regular rhythm and normal heart sounds. Exam reveals no gallop.   No murmur heard.  Pulmonary/Chest: Effort normal and breath sounds normal. No respiratory distress. She has no wheezes. She has no rales.   Abdominal: Soft. Normal appearance and bowel sounds are normal. She exhibits no mass. There is no abdominal tenderness. There is no guarding.   Neurological: She is alert.         Problems Addressed this Visit           Cardiac and Vasculature    Hypertension - Primary (Chronic)    Hyperlipidemia (Chronic)       Gastrointestinal Abdominal     GERD (gastroesophageal reflux disease) (Chronic)     Diagnoses         Codes Comments    Primary hypertension    -  Primary ICD-10-CM: I10  ICD-9-CM: 401.9     Hyperlipidemia, unspecified hyperlipidemia type     ICD-10-CM: E78.5  ICD-9-CM: 272.4     Gastroesophageal reflux disease, unspecified whether esophagitis present     ICD-10-CM: K21.9  ICD-9-CM: 530.81           Assessment & Plan   In for recheck of hypertension, hyperlipidemia, IBS and GERD today April 2024.  Has diarrhea predominant irritable bowel which is doing pretty well overall.  Currently controlled with Colestid.  Off of Metamucil.  Off of probiotics.  Nan is controlling her constipation.  Annual labs today April 2024 including CBC, CMP, lipids, UA.  Lipids q 3 mos. Sertraline dose is back to 150 mg daily.  Depression is better now.  Remains on Naprosyn 225 mg daily for arthritis.  Remains on Zetia 10 mg daily and Lipitor 20 mg daily for hyperlipidemia and those are reviewed today.  Annual preventive exam January 2025.  She is fasting today.    She has had some left shoulder pain on and off for 1 month now.  No specific injury.  Pain mostly with movement and sometimes sleeping on that shoulder.  Pain with internal and external rotation.  Probably rotator cuff tendinopathy.  Will get set up for some physical therapy.    The above information was reviewed again today 04/15/24.  It continues to be accurate as reflected above and is unchanged.  History, physical and review of systems all reviewed and are unchanged.  Medications were reviewed today and continue the current dosing.           Shree disclaimer:   Much of this encounter note is an electronic transcription/translation of spoken language to printed text. The electronic translation of spoken language may permit erroneous, or at times, nonsensical words or phrases to be  inadvertently transcribed; Although I have reviewed the note for such errors, some may still exist.

## 2024-05-10 NOTE — PROGRESS NOTES
For information on Fall & Injury Prevention, visit: [email or web address] OR  [email or web address] OR  https://www.cdc.gov/steadi/patient.html Subjective   Ann-Marie Eaton is a 62 y.o. female.     Chief Complaint   Patient presents with   • Hypertension   • Hyperlipidemia         Hypertension  This is a chronic problem. The current episode started more than 1 year ago. The problem is unchanged. The problem is controlled. Pertinent negatives include no orthopnea, palpitations, peripheral edema or shortness of breath. There are no associated agents to hypertension. Risk factors for coronary artery disease include dyslipidemia, post-menopausal state, stress, sedentary lifestyle and smoking/tobacco exposure. Current antihypertension treatment includes ACE inhibitors and calcium channel blockers.   Hyperlipidemia  This is a chronic problem. The current episode started more than 1 year ago. The problem is controlled. Recent lipid tests were reviewed and are normal. Pertinent negatives include no shortness of breath.   Irritable Bowel Syndrome  This is a chronic problem.   Heartburn  She reports no wheezing. This is a chronic problem. The current episode started more than 1 year ago. The problem occurs occasionally. The problem has been unchanged. She has tried a histamine-2 antagonist for the symptoms. The treatment provided significant relief.        The following portions of the patient's history were reviewed and updated as appropriate: allergies, current medications, past social history and problem list.    Outpatient Medications Marked as Taking for the 5/31/22 encounter (Office Visit) with John Cowan MD   Medication Sig Dispense Refill   • atorvastatin (LIPITOR) 20 MG tablet TAKE 1 TABLET BY MOUTH DAILY 90 tablet 1   • dicyclomine (BENTYL) 10 MG capsule Take 1 capsule by mouth 4 (Four) Times a Day Before Meals & at Bedtime. 60 capsule 3   • ezetimibe (ZETIA) 10 MG tablet TAKE 1 TABLET BY MOUTH DAILY 90 tablet 1   • hydroCHLOROthiazide (MICROZIDE) 12.5 MG capsule TAKE 1 CAPSULE BY MOUTH DAILY 90 capsule 1   • lisinopril (PRINIVIL,ZESTRIL) 10 MG tablet  TAKE 1 TABLET BY MOUTH DAILY 90 tablet 1   • loperamide (IMODIUM) 2 MG capsule Take 8 mg by mouth As Needed for Diarrhea.     • multivitamin with minerals tablet tablet Take 1 tablet by mouth Daily.     • Naproxen (NAPROSYN PO) Take 440 mg by mouth Daily As Needed (PAIN).     • omeprazole (priLOSEC) 20 MG capsule Take 20 mg by mouth Daily.     • Probiotic Product (PROBIOTIC DAILY PO) Take 1 tablet by mouth Daily. Taking while on antibiotic therapy.     • sertraline (ZOLOFT) 100 MG tablet TAKE 1 AND 1/2 TABLETS BY MOUTH DAILY 135 tablet 1   • valACYclovir (VALTREX) 500 MG tablet TAKE 1 TABLET BY MOUTH DAILY 90 tablet 1   • vitamin D (ERGOCALCIFEROL) 1.25 MG (53443 UT) capsule capsule TAKE 1 CAPSULE BY MOUTH 1 TIME A WEEK 13 capsule 1       Review of Systems   Respiratory: Negative for shortness of breath and wheezing.    Cardiovascular: Negative for palpitations, orthopnea and leg swelling.   Gastrointestinal: Negative for constipation and diarrhea.       Objective   Vitals:    05/31/22 0950   BP: 112/70   Pulse: 62   Resp: 18   Temp: 97.3 °F (36.3 °C)   SpO2: 97%          05/31/22  0950   Weight: 115 kg (254 lb)    [unfilled]  Body mass index is 46.46 kg/m².      Physical Exam   Constitutional: She appears well-developed.   Cardiovascular: Normal rate, regular rhythm and normal heart sounds. Exam reveals no gallop.   No murmur heard.  Pulmonary/Chest: Effort normal and breath sounds normal. No respiratory distress. She has no wheezes. She has no rales.   Abdominal: Soft. Normal appearance and bowel sounds are normal. She exhibits no mass. There is no abdominal tenderness. There is no guarding.   Neurological: She is alert.         Problems Addressed this Visit        Cardiac and Vasculature    Hypertension - Primary (Chronic)    Hyperlipidemia (Chronic)       Gastrointestinal Abdominal     IBS (irritable bowel syndrome) (Chronic)    GERD (gastroesophageal reflux disease) (Chronic)    Relevant Medications     omeprazole (priLOSEC) 20 MG capsule    dicyclomine (BENTYL) 10 MG capsule      Diagnoses       Codes Comments    Primary hypertension    -  Primary ICD-10-CM: I10  ICD-9-CM: 401.9     Hyperlipidemia, unspecified hyperlipidemia type     ICD-10-CM: E78.5  ICD-9-CM: 272.4     Irritable bowel syndrome with diarrhea     ICD-10-CM: K58.0  ICD-9-CM: 564.1     Gastroesophageal reflux disease, unspecified whether esophagitis present     ICD-10-CM: K21.9  ICD-9-CM: 530.81         Assessment & Plan   In for recheck of hypertension, hyperlipidemia, IBS and GERD.   Has diarrhea predominant irritable bowel which is doing pretty well overall.  Takes Imodium when necessary.  She is still on probiotics.  Annual labs July 2021 including CBC, CMP, lipids, UA.  Lipids q 3 mos. Sertraline dose is back to 150 mg daily.  Depression is better now.  Remain on Naprosyn 225 mg daily for arthritis.  Remains on Zetia 10 mg daily and Lipitor 20 mg daily for hyperlipidemia and those are reviewed today.  Annual preventive exam next visit August 2022.  She is fasting today.    The above information was reviewed again today 05/31/22.  It continues to be accurate as reflected above and is unchanged.  History, physical and review of systems all reviewed and are unchanged.  Medications were reviewed today and continue the current dosing.    PPE today includes face mask and eye shield.         Dragon disclaimer:   Much of this encounter note is an electronic transcription/translation of spoken language to printed text. The electronic translation of spoken language may permit erroneous, or at times, nonsensical words or phrases to be inadvertently transcribed; Although I have reviewed the note for such errors, some may still exist.

## 2024-06-03 RX ORDER — LISINOPRIL 10 MG/1
10 TABLET ORAL DAILY
Qty: 90 TABLET | Refills: 1 | Status: SHIPPED | OUTPATIENT
Start: 2024-06-03

## 2024-06-03 RX ORDER — EZETIMIBE 10 MG/1
10 TABLET ORAL DAILY
Qty: 90 TABLET | Refills: 1 | Status: SHIPPED | OUTPATIENT
Start: 2024-06-03

## 2024-06-03 RX ORDER — HYDROCHLOROTHIAZIDE 12.5 MG/1
12.5 CAPSULE, GELATIN COATED ORAL DAILY
Qty: 90 CAPSULE | Refills: 1 | Status: SHIPPED | OUTPATIENT
Start: 2024-06-03

## 2024-06-03 RX ORDER — SERTRALINE HYDROCHLORIDE 100 MG/1
TABLET, FILM COATED ORAL
Qty: 135 TABLET | Refills: 1 | Status: SHIPPED | OUTPATIENT
Start: 2024-06-03

## 2024-07-22 ENCOUNTER — LAB (OUTPATIENT)
Dept: LAB | Facility: HOSPITAL | Age: 64
End: 2024-07-22
Payer: COMMERCIAL

## 2024-07-22 ENCOUNTER — OFFICE VISIT (OUTPATIENT)
Dept: INTERNAL MEDICINE | Facility: CLINIC | Age: 64
End: 2024-07-22
Payer: COMMERCIAL

## 2024-07-22 VITALS
RESPIRATION RATE: 18 BRPM | BODY MASS INDEX: 42.68 KG/M2 | OXYGEN SATURATION: 98 % | TEMPERATURE: 98.5 F | DIASTOLIC BLOOD PRESSURE: 80 MMHG | WEIGHT: 250 LBS | HEIGHT: 64 IN | HEART RATE: 82 BPM | SYSTOLIC BLOOD PRESSURE: 122 MMHG

## 2024-07-22 DIAGNOSIS — I10 PRIMARY HYPERTENSION: Primary | Chronic | ICD-10-CM

## 2024-07-22 DIAGNOSIS — E78.5 HYPERLIPIDEMIA, UNSPECIFIED HYPERLIPIDEMIA TYPE: Chronic | ICD-10-CM

## 2024-07-22 LAB
CHOLEST SERPL-MCNC: 187 MG/DL (ref 0–200)
HDLC SERPL QL: 4.35
HDLC SERPL-MCNC: 43 MG/DL (ref 40–60)
LDLC SERPL CALC-MCNC: 119 MG/DL (ref 0–100)
TRIGL SERPL-MCNC: 141 MG/DL (ref 0–150)
VLDLC SERPL-MCNC: 25 MG/DL (ref 5–40)

## 2024-07-22 PROCEDURE — 80061 LIPID PANEL: CPT | Performed by: INTERNAL MEDICINE

## 2024-07-22 PROCEDURE — 36415 COLL VENOUS BLD VENIPUNCTURE: CPT | Performed by: INTERNAL MEDICINE

## 2024-07-22 PROCEDURE — 99214 OFFICE O/P EST MOD 30 MIN: CPT | Performed by: INTERNAL MEDICINE

## 2024-07-22 NOTE — PROGRESS NOTES
Subjective   Ann-Marie Eaton is a 64 y.o. female.     Chief Complaint   Patient presents with    Hyperlipidemia    Hypertension    Irritable Bowel Syndrome         Hyperlipidemia  This is a chronic problem. The current episode started more than 1 year ago. The problem is controlled. Recent lipid tests were reviewed and are normal. Pertinent negatives include no chest pain or shortness of breath.   Hypertension  This is a chronic problem. The current episode started more than 1 year ago. The problem is unchanged. The problem is controlled. Associated symptoms include peripheral edema. Pertinent negatives include no chest pain, headaches, orthopnea, palpitations or shortness of breath.        The following portions of the patient's history were reviewed and updated as appropriate: allergies, current medications, past social history and problem list.    Outpatient Medications Marked as Taking for the 7/22/24 encounter (Office Visit) with John Cowan MD   Medication Sig Dispense Refill    albuterol sulfate  (90 Base) MCG/ACT inhaler Inhale 2 puffs Every 4 (Four) Hours As Needed for Wheezing. 18 g 0    atorvastatin (LIPITOR) 20 MG tablet TAKE 1 TABLET BY MOUTH DAILY 90 tablet 1    colestipol (COLESTID) 1 g tablet TAKE 1 TABLET ONCE TO TWICE DAILY PRN DIARRHEA AND STOOL URGENCY      dicyclomine (BENTYL) 10 MG capsule TAKE 1 CAPSULE BY MOUTH FOUR TIMES DAILY BEFORE MEALS AND AT BEDTIME 60 capsule 3    ezetimibe (ZETIA) 10 MG tablet TAKE 1 TABLET BY MOUTH DAILY 90 tablet 1    famotidine (PEPCID) 20 MG tablet Take 1 tablet by mouth 2 (Two) Times a Day.      hydroCHLOROthiazide (MICROZIDE) 12.5 MG capsule TAKE 1 CAPSULE BY MOUTH DAILY 90 capsule 1    lisinopril (PRINIVIL,ZESTRIL) 10 MG tablet TAKE 1 TABLET BY MOUTH DAILY 90 tablet 1    multivitamin with minerals tablet tablet Take 1 tablet by mouth Daily.      naproxen sodium (ALEVE) 220 MG tablet Take 1 tablet by mouth 2 (Two) Times a Day As Needed.      potassium  chloride 10 MEQ CR tablet TAKE 1 TABLET BY MOUTH TWICE DAILY 60 tablet 2    sertraline (ZOLOFT) 100 MG tablet TAKE 1 AND 1/2 TABLETS BY MOUTH DAILY 135 tablet 1    valACYclovir (VALTREX) 500 MG tablet TAKE 1 TABLET BY MOUTH DAILY 90 tablet 1    vitamin D (ERGOCALCIFEROL) 1.25 MG (01320 UT) capsule capsule TAKE 1 CAPSULE BY MOUTH 1 TIME A WEEK 13 capsule 1       Review of Systems   Respiratory:  Negative for shortness of breath and wheezing.    Cardiovascular:  Negative for chest pain, palpitations, orthopnea and leg swelling.   Gastrointestinal:  Negative for constipation (controlled with bentyl) and diarrhea (controlled with colestid).   Neurological:  Negative for headaches.       Objective   Vitals:    07/22/24 0957   BP: 122/80   Pulse: 82   Resp: 18   Temp: 98.5 °F (36.9 °C)   SpO2: 98%          07/22/24 0957   Weight: 113 kg (250 lb)    [unfilled]  Body mass index is 42.91 kg/m².      Physical Exam   Constitutional: She appears well-developed.   Cardiovascular: Normal rate, regular rhythm and normal heart sounds. Exam reveals no gallop.   No murmur heard.  Pulmonary/Chest: Effort normal and breath sounds normal. No respiratory distress. She has no wheezes. She has no rales.   Abdominal: Soft. Normal appearance and bowel sounds are normal. She exhibits no mass. There is no abdominal tenderness. There is no guarding.   Neurological: She is alert.         Problems Addressed this Visit          Cardiac and Vasculature    Hypertension - Primary (Chronic)    Hyperlipidemia (Chronic)     Diagnoses         Codes Comments    Primary hypertension    -  Primary ICD-10-CM: I10  ICD-9-CM: 401.9     Hyperlipidemia, unspecified hyperlipidemia type     ICD-10-CM: E78.5  ICD-9-CM: 272.4           Assessment & Plan   In for recheck of hypertension, hyperlipidemia, IBS and GERD today July 2024.  Has diarrhea predominant irritable bowel which is doing pretty well overall.  Currently controlled with Colestid.  Off of  Metamucil.  Off of probiotics.  Nan is controlling her constipation.  Annual labs April 2024 including CBC, CMP, lipids, UA.  Lipids q 3 mos. Sertraline dose is back to 150 mg daily.  Depression is better now.  Remains on Naprosyn 225 mg daily for arthritis.  Remains on Zetia 10 mg daily and Lipitor 20 mg daily for hyperlipidemia and those are reviewed today.  Annual preventive exam January 2025.  She is fasting today.    The above information was reviewed again today 07/22/24.  It continues to be accurate as reflected above and is unchanged.  History, physical and review of systems all reviewed and are unchanged.  Medications were reviewed today and continue the current dosing.         Dragon disclaimer:   Much of this encounter note is an electronic transcription/translation of spoken language to printed text. The electronic translation of spoken language may permit erroneous, or at times, nonsensical words or phrases to be inadvertently transcribed; Although I have reviewed the note for such errors, some may still exist.

## 2024-08-06 RX ORDER — POTASSIUM CHLORIDE 750 MG/1
10 TABLET, FILM COATED, EXTENDED RELEASE ORAL 2 TIMES DAILY
Qty: 60 TABLET | Refills: 2 | Status: SHIPPED | OUTPATIENT
Start: 2024-08-06

## 2024-09-09 RX ORDER — VALACYCLOVIR HYDROCHLORIDE 500 MG/1
500 TABLET, FILM COATED ORAL DAILY
Qty: 90 TABLET | Refills: 1 | OUTPATIENT
Start: 2024-09-09

## 2024-09-09 RX ORDER — VALACYCLOVIR HYDROCHLORIDE 500 MG/1
500 TABLET, FILM COATED ORAL DAILY
Qty: 90 TABLET | Refills: 1 | Status: SHIPPED | OUTPATIENT
Start: 2024-09-09

## 2024-09-09 RX ORDER — ERGOCALCIFEROL 1.25 MG/1
CAPSULE, LIQUID FILLED ORAL
Qty: 13 CAPSULE | Refills: 1 | Status: SHIPPED | OUTPATIENT
Start: 2024-09-09

## 2024-11-01 ENCOUNTER — LAB (OUTPATIENT)
Dept: LAB | Facility: HOSPITAL | Age: 64
End: 2024-11-01
Payer: COMMERCIAL

## 2024-11-01 ENCOUNTER — OFFICE VISIT (OUTPATIENT)
Dept: INTERNAL MEDICINE | Facility: CLINIC | Age: 64
End: 2024-11-01
Payer: COMMERCIAL

## 2024-11-01 VITALS
HEART RATE: 57 BPM | HEIGHT: 61 IN | TEMPERATURE: 98.4 F | OXYGEN SATURATION: 98 % | SYSTOLIC BLOOD PRESSURE: 126 MMHG | WEIGHT: 260 LBS | BODY MASS INDEX: 49.09 KG/M2 | RESPIRATION RATE: 18 BRPM | DIASTOLIC BLOOD PRESSURE: 80 MMHG

## 2024-11-01 DIAGNOSIS — I10 PRIMARY HYPERTENSION: Primary | Chronic | ICD-10-CM

## 2024-11-01 DIAGNOSIS — E78.5 HYPERLIPIDEMIA, UNSPECIFIED HYPERLIPIDEMIA TYPE: Chronic | ICD-10-CM

## 2024-11-01 DIAGNOSIS — K21.9 GASTROESOPHAGEAL REFLUX DISEASE, UNSPECIFIED WHETHER ESOPHAGITIS PRESENT: Chronic | ICD-10-CM

## 2024-11-01 LAB
CHOLEST SERPL-MCNC: 188 MG/DL (ref 0–200)
HDLC SERPL QL: 4.48
HDLC SERPL-MCNC: 42 MG/DL (ref 40–60)
LDLC SERPL CALC-MCNC: 118 MG/DL (ref 0–100)
TRIGL SERPL-MCNC: 159 MG/DL (ref 0–150)
VLDLC SERPL-MCNC: 28 MG/DL (ref 5–40)

## 2024-11-01 PROCEDURE — 80061 LIPID PANEL: CPT | Performed by: INTERNAL MEDICINE

## 2024-11-01 PROCEDURE — 99214 OFFICE O/P EST MOD 30 MIN: CPT | Performed by: INTERNAL MEDICINE

## 2024-11-01 PROCEDURE — 36415 COLL VENOUS BLD VENIPUNCTURE: CPT | Performed by: INTERNAL MEDICINE

## 2024-11-01 NOTE — PROGRESS NOTES
Subjective   Ann-Marie Eaton is a 64 y.o. female.     Chief Complaint   Patient presents with    Hyperlipidemia    Hypertension    Irritable Bowel Syndrome         Hyperlipidemia  This is a chronic problem. The current episode started more than 1 year ago. The problem is controlled. Recent lipid tests were reviewed and are normal. Pertinent negatives include no chest pain or shortness of breath.   Hypertension  This is a chronic problem. The current episode started more than 1 year ago. The problem is unchanged. The problem is controlled. Associated symptoms include peripheral edema. Pertinent negatives include no chest pain, headaches, orthopnea, palpitations or shortness of breath.   Irritable Bowel Syndrome  Pertinent negatives include no chest pain or headaches.        The following portions of the patient's history were reviewed and updated as appropriate: allergies, current medications, past social history and problem list.    Outpatient Medications Marked as Taking for the 11/1/24 encounter (Office Visit) with John Cowan MD   Medication Sig Dispense Refill    albuterol sulfate  (90 Base) MCG/ACT inhaler Inhale 2 puffs Every 4 (Four) Hours As Needed for Wheezing. 18 g 0    atorvastatin (LIPITOR) 20 MG tablet TAKE 1 TABLET BY MOUTH DAILY 90 tablet 1    colestipol (COLESTID) 1 g tablet TAKE 1 TABLET ONCE TO TWICE DAILY PRN DIARRHEA AND STOOL URGENCY      dicyclomine (BENTYL) 10 MG capsule TAKE 1 CAPSULE BY MOUTH FOUR TIMES DAILY BEFORE MEALS AND AT BEDTIME 60 capsule 3    ezetimibe (ZETIA) 10 MG tablet TAKE 1 TABLET BY MOUTH DAILY 90 tablet 1    famotidine (PEPCID) 20 MG tablet Take 1 tablet by mouth 2 (Two) Times a Day.      fluticasone (FLONASE) 50 MCG/ACT nasal spray 2 sprays into the nostril(s) as directed by provider Daily. 16 g 0    hydroCHLOROthiazide (MICROZIDE) 12.5 MG capsule TAKE 1 CAPSULE BY MOUTH DAILY 90 capsule 1    lisinopril (PRINIVIL,ZESTRIL) 10 MG tablet TAKE 1 TABLET BY MOUTH DAILY  90 tablet 1    multivitamin with minerals tablet tablet Take 1 tablet by mouth Daily.      naproxen sodium (ALEVE) 220 MG tablet Take 1 tablet by mouth 2 (Two) Times a Day As Needed.      potassium chloride 10 MEQ CR tablet TAKE 1 TABLET BY MOUTH TWICE DAILY 60 tablet 2    sertraline (ZOLOFT) 100 MG tablet TAKE 1 AND 1/2 TABLETS BY MOUTH DAILY 135 tablet 1    triamcinolone (KENALOG) 0.1 % cream Apply 1 Application topically to the appropriate area as directed 3 (Three) Times a Day. 30 g 0    valACYclovir (VALTREX) 500 MG tablet TAKE 1 TABLET BY MOUTH DAILY 90 tablet 1    vitamin D (ERGOCALCIFEROL) 1.25 MG (65060 UT) capsule capsule TAKE 1 CAPSULE BY MOUTH 1 TIME A WEEK 13 capsule 1       Review of Systems   Respiratory:  Negative for shortness of breath and wheezing.    Cardiovascular:  Negative for chest pain, palpitations, orthopnea and leg swelling.   Gastrointestinal:  Negative for constipation (controlled with bentyl) and diarrhea (controlled with colestid).   Neurological:  Negative for headaches.       Objective   Vitals:    11/01/24 1003   BP: 126/80   Pulse: 57   Resp: 18   Temp: 98.4 °F (36.9 °C)   SpO2: 98%          11/01/24  1003   Weight: 118 kg (260 lb)    [unfilled]  Body mass index is 49.13 kg/m².      Physical Exam   Constitutional: She appears well-developed.   Cardiovascular: Normal rate, regular rhythm and normal heart sounds. Exam reveals no gallop.   No murmur heard.  Pulmonary/Chest: Effort normal and breath sounds normal. No respiratory distress. She has no wheezes. She has no rales.   Abdominal: Soft. Normal appearance and bowel sounds are normal. She exhibits no mass. There is no abdominal tenderness. There is no guarding.   Neurological: She is alert.         Problems Addressed this Visit          Cardiac and Vasculature    Hypertension - Primary (Chronic)    Hyperlipidemia (Chronic)       Gastrointestinal Abdominal     GERD (gastroesophageal reflux disease) (Chronic)     Diagnoses          Codes Comments    Primary hypertension    -  Primary ICD-10-CM: I10  ICD-9-CM: 401.9     Hyperlipidemia, unspecified hyperlipidemia type     ICD-10-CM: E78.5  ICD-9-CM: 272.4     Gastroesophageal reflux disease, unspecified whether esophagitis present     ICD-10-CM: K21.9  ICD-9-CM: 530.81           Assessment & Plan   In for recheck of hypertension, hyperlipidemia, IBS and GERD today November 2024.  Has diarrhea predominant irritable bowel which is doing pretty well overall.  Currently controlled with Colestid.  Off of Metamucil.  Off of probiotics.  Nan is controlling her constipation.  Annual labs April 2024 including CBC, CMP, lipids, UA.  Lipids q 3 mos. Sertraline dose is back to 150 mg daily.  Depression is better now.  Remains on Naprosyn 225 mg daily for arthritis.  Remains on Zetia 10 mg daily and Lipitor 20 mg daily for hyperlipidemia and those are reviewed today.  Annual preventive exam January 2025.  She is fasting today.    The above information was reviewed again today 11/01/24.  It continues to be accurate as reflected above and is unchanged.  History, physical and review of systems all reviewed and are unchanged.  Medications were reviewed today and continue the current dosing.         Dragon disclaimer:   Much of this encounter note is an electronic transcription/translation of spoken language to printed text. The electronic translation of spoken language may permit erroneous, or at times, nonsensical words or phrases to be inadvertently transcribed; Although I have reviewed the note for such errors, some may still exist.

## 2024-12-02 RX ORDER — SERTRALINE HYDROCHLORIDE 100 MG/1
TABLET, FILM COATED ORAL
Qty: 135 TABLET | Refills: 1 | Status: SHIPPED | OUTPATIENT
Start: 2024-12-02

## 2024-12-02 RX ORDER — EZETIMIBE 10 MG/1
10 TABLET ORAL DAILY
Qty: 90 TABLET | Refills: 1 | Status: SHIPPED | OUTPATIENT
Start: 2024-12-02

## 2024-12-02 RX ORDER — LISINOPRIL 10 MG/1
10 TABLET ORAL DAILY
Qty: 90 TABLET | Refills: 1 | Status: SHIPPED | OUTPATIENT
Start: 2024-12-02

## 2024-12-02 RX ORDER — HYDROCHLOROTHIAZIDE 12.5 MG/1
12.5 CAPSULE ORAL DAILY
Qty: 90 CAPSULE | Refills: 1 | Status: SHIPPED | OUTPATIENT
Start: 2024-12-02

## 2024-12-05 RX ORDER — POTASSIUM CHLORIDE 750 MG/1
10 TABLET, EXTENDED RELEASE ORAL 2 TIMES DAILY
Qty: 60 TABLET | Refills: 2 | Status: SHIPPED | OUTPATIENT
Start: 2024-12-05

## 2024-12-17 RX ORDER — VALACYCLOVIR HYDROCHLORIDE 500 MG/1
500 TABLET, FILM COATED ORAL DAILY
Qty: 90 TABLET | Refills: 1 | Status: SHIPPED | OUTPATIENT
Start: 2024-12-17

## 2025-01-13 RX ORDER — POTASSIUM CHLORIDE 750 MG/1
10 TABLET, EXTENDED RELEASE ORAL 2 TIMES DAILY
Qty: 180 TABLET | Refills: 1 | Status: SHIPPED | OUTPATIENT
Start: 2025-01-13

## 2025-02-21 ENCOUNTER — PATIENT ROUNDING (BHMG ONLY) (OUTPATIENT)
Dept: URGENT CARE | Facility: CLINIC | Age: 65
End: 2025-02-21
Payer: COMMERCIAL

## 2025-02-21 NOTE — ED NOTES
Thank you for letting us care for you in your recent visit to our urgent care center. We would love to hear about your experience with us. Was this the first time you have visited our location?    We’re always looking for ways to make our patients’ experiences even better. Do you have any recommendations on ways we may improve?     I appreciate you taking the time to respond. Please be on the lookout for a survey about your recent visit from [employer] via text or email. We would greatly appreciate if you could fill that out and turn it back in. We want your voice to be heard and we value your feedback.   Thank you for choosing [facility] for your healthcare needs.

## 2025-02-24 RX ORDER — ERGOCALCIFEROL 1.25 MG/1
CAPSULE, LIQUID FILLED ORAL
Qty: 13 CAPSULE | Refills: 1 | Status: SHIPPED | OUTPATIENT
Start: 2025-02-24

## 2025-03-06 ENCOUNTER — OFFICE VISIT (OUTPATIENT)
Dept: INTERNAL MEDICINE | Facility: CLINIC | Age: 65
End: 2025-03-06
Payer: COMMERCIAL

## 2025-03-06 ENCOUNTER — LAB (OUTPATIENT)
Dept: LAB | Facility: HOSPITAL | Age: 65
End: 2025-03-06
Payer: COMMERCIAL

## 2025-03-06 VITALS
HEART RATE: 84 BPM | SYSTOLIC BLOOD PRESSURE: 120 MMHG | RESPIRATION RATE: 18 BRPM | DIASTOLIC BLOOD PRESSURE: 84 MMHG | HEIGHT: 62 IN | TEMPERATURE: 96.9 F | OXYGEN SATURATION: 95 % | BODY MASS INDEX: 47.84 KG/M2 | WEIGHT: 260 LBS

## 2025-03-06 DIAGNOSIS — J06.9 UPPER RESPIRATORY TRACT INFECTION, UNSPECIFIED TYPE: ICD-10-CM

## 2025-03-06 DIAGNOSIS — Z79.899 MEDICATION MANAGEMENT: ICD-10-CM

## 2025-03-06 DIAGNOSIS — I10 PRIMARY HYPERTENSION: Chronic | ICD-10-CM

## 2025-03-06 DIAGNOSIS — E78.5 HYPERLIPIDEMIA, UNSPECIFIED HYPERLIPIDEMIA TYPE: Chronic | ICD-10-CM

## 2025-03-06 DIAGNOSIS — Z00.00 ENCOUNTER FOR PREVENTIVE HEALTH EXAMINATION: Primary | ICD-10-CM

## 2025-03-06 LAB
ALBUMIN SERPL-MCNC: 4 G/DL (ref 3.5–5.2)
ALBUMIN/GLOB SERPL: 1.3 G/DL
ALP SERPL-CCNC: 101 U/L (ref 39–117)
ALT SERPL W P-5'-P-CCNC: 29 U/L (ref 1–33)
ANION GAP SERPL CALCULATED.3IONS-SCNC: 8.7 MMOL/L (ref 5–15)
AST SERPL-CCNC: 31 U/L (ref 1–32)
BASOPHILS # BLD AUTO: 0.04 10*3/MM3 (ref 0–0.2)
BASOPHILS NFR BLD AUTO: 0.4 % (ref 0–1.5)
BILIRUB SERPL-MCNC: 0.5 MG/DL (ref 0–1.2)
BUN SERPL-MCNC: 10 MG/DL (ref 8–23)
BUN/CREAT SERPL: 15.9 (ref 7–25)
CALCIUM SPEC-SCNC: 9.4 MG/DL (ref 8.6–10.5)
CHLORIDE SERPL-SCNC: 102 MMOL/L (ref 98–107)
CHOLEST SERPL-MCNC: 188 MG/DL (ref 0–200)
CO2 SERPL-SCNC: 27.3 MMOL/L (ref 22–29)
CREAT SERPL-MCNC: 0.63 MG/DL (ref 0.57–1)
DEPRECATED RDW RBC AUTO: 48.8 FL (ref 37–54)
EGFRCR SERPLBLD CKD-EPI 2021: 97.4 ML/MIN/1.73
EOSINOPHIL # BLD AUTO: 0.35 10*3/MM3 (ref 0–0.4)
EOSINOPHIL NFR BLD AUTO: 3.8 % (ref 0.3–6.2)
ERYTHROCYTE [DISTWIDTH] IN BLOOD BY AUTOMATED COUNT: 12.7 % (ref 12.3–15.4)
GLOBULIN UR ELPH-MCNC: 3.1 GM/DL
GLUCOSE SERPL-MCNC: 98 MG/DL (ref 65–99)
HCT VFR BLD AUTO: 41.2 % (ref 34–46.6)
HDLC SERPL QL: 4.48
HDLC SERPL-MCNC: 42 MG/DL (ref 40–60)
HGB BLD-MCNC: 14 G/DL (ref 12–15.9)
IMM GRANULOCYTES # BLD AUTO: 0.03 10*3/MM3 (ref 0–0.05)
IMM GRANULOCYTES NFR BLD AUTO: 0.3 % (ref 0–0.5)
LDLC SERPL CALC-MCNC: 116 MG/DL (ref 0–100)
LYMPHOCYTES # BLD AUTO: 1.77 10*3/MM3 (ref 0.7–3.1)
LYMPHOCYTES NFR BLD AUTO: 19.3 % (ref 19.6–45.3)
MCH RBC QN AUTO: 35 PG (ref 26.6–33)
MCHC RBC AUTO-ENTMCNC: 34 G/DL (ref 31.5–35.7)
MCV RBC AUTO: 103 FL (ref 79–97)
MONOCYTES # BLD AUTO: 0.7 10*3/MM3 (ref 0.1–0.9)
MONOCYTES NFR BLD AUTO: 7.7 % (ref 5–12)
NEUTROPHILS NFR BLD AUTO: 6.26 10*3/MM3 (ref 1.7–7)
NEUTROPHILS NFR BLD AUTO: 68.5 % (ref 42.7–76)
NRBC BLD AUTO-RTO: 0 /100 WBC (ref 0–0.2)
PLATELET # BLD AUTO: 221 10*3/MM3 (ref 140–450)
PMV BLD AUTO: 9.8 FL (ref 6–12)
POTASSIUM SERPL-SCNC: 4 MMOL/L (ref 3.5–5.2)
PROT SERPL-MCNC: 7.1 G/DL (ref 6–8.5)
RBC # BLD AUTO: 4 10*6/MM3 (ref 3.77–5.28)
SODIUM SERPL-SCNC: 138 MMOL/L (ref 136–145)
TRIGL SERPL-MCNC: 169 MG/DL (ref 0–150)
VLDLC SERPL-MCNC: 30 MG/DL (ref 5–40)
WBC NRBC COR # BLD AUTO: 9.15 10*3/MM3 (ref 3.4–10.8)

## 2025-03-06 PROCEDURE — 36415 COLL VENOUS BLD VENIPUNCTURE: CPT | Performed by: INTERNAL MEDICINE

## 2025-03-06 PROCEDURE — 99397 PER PM REEVAL EST PAT 65+ YR: CPT | Performed by: INTERNAL MEDICINE

## 2025-03-06 PROCEDURE — 80061 LIPID PANEL: CPT | Performed by: INTERNAL MEDICINE

## 2025-03-06 PROCEDURE — 80053 COMPREHEN METABOLIC PANEL: CPT | Performed by: INTERNAL MEDICINE

## 2025-03-06 PROCEDURE — 85025 COMPLETE CBC W/AUTO DIFF WBC: CPT | Performed by: INTERNAL MEDICINE

## 2025-03-06 RX ORDER — ALBUTEROL SULFATE 90 UG/1
2 INHALANT RESPIRATORY (INHALATION) EVERY 4 HOURS PRN
Qty: 18 G | Refills: 0 | Status: SHIPPED | OUTPATIENT
Start: 2025-03-06

## 2025-03-06 NOTE — PROGRESS NOTES
Subjective   Ann-Marie Eaton is a 65 y.o. female.     Chief Complaint   Patient presents with    Annual Exam    Hypertension    Hyperlipidemia    Cough    Fatigue         History of Present Illness  In for annual preventative exam.  Sleeps 6 hours per night.  No exercise.  Energy is low.  Diet is so so.  Hypertension  This is a chronic problem. The current episode started more than 1 year ago. Associated symptoms include headaches and palpitations (recently since covid). Pertinent negatives include no chest pain or shortness of breath.   Hyperlipidemia  This is a chronic problem. The current episode started more than 1 year ago. Pertinent negatives include no chest pain, myalgias or shortness of breath.        The following portions of the patient's history were reviewed and updated as appropriate: allergies, current medications, past social history and problem list.    HISTORY  Outpatient Medications Marked as Taking for the 3/6/25 encounter (Office Visit) with John Cowan MD   Medication Sig Dispense Refill    albuterol sulfate  (90 Base) MCG/ACT inhaler Inhale 2 puffs Every 4 (Four) Hours As Needed for Wheezing. 18 g 0    atorvastatin (LIPITOR) 20 MG tablet TAKE 1 TABLET BY MOUTH DAILY 90 tablet 1    benzonatate (TESSALON) 200 MG capsule Take 1 capsule by mouth 3 (Three) Times a Day As Needed for Cough. 30 capsule 0    colestipol (COLESTID) 1 g tablet TAKE 1 TABLET ONCE TO TWICE DAILY PRN DIARRHEA AND STOOL URGENCY      dicyclomine (BENTYL) 20 MG tablet Take 1 tablet by mouth.      Ergocalciferol (Vitamin D2) 10 MCG (400 UNIT) tablet Take  by mouth.      ezetimibe (ZETIA) 10 MG tablet TAKE 1 TABLET BY MOUTH DAILY 90 tablet 1    famotidine (PEPCID) 20 MG tablet Take 1 tablet by mouth 2 (Two) Times a Day.      fluticasone (FLONASE) 50 MCG/ACT nasal spray 2 sprays into the nostril(s) as directed by provider Daily. 16 g 0    hydroCHLOROthiazide (MICROZIDE) 12.5 MG capsule TAKE 1 CAPSULE BY MOUTH DAILY 90  capsule 1    lisinopril (PRINIVIL,ZESTRIL) 10 MG tablet TAKE 1 TABLET BY MOUTH DAILY 90 tablet 1    multivitamin with minerals tablet tablet Take 1 tablet by mouth Daily.      naproxen sodium (ALEVE) 220 MG tablet Take 1 tablet by mouth 2 (Two) Times a Day As Needed.      potassium chloride 10 MEQ CR tablet TAKE 1 TABLET BY MOUTH TWICE DAILY 180 tablet 1    sertraline (ZOLOFT) 100 MG tablet TAKE 1 AND 1/2 TABLETS BY MOUTH DAILY 135 tablet 1    triamcinolone (KENALOG) 0.1 % cream Apply 1 Application topically to the appropriate area as directed 3 (Three) Times a Day. 30 g 0    valACYclovir (VALTREX) 500 MG tablet TAKE 1 TABLET BY MOUTH DAILY 90 tablet 1    vitamin D (ERGOCALCIFEROL) 1.25 MG (27863 UT) capsule capsule TAKE 1 CAPSULE BY MOUTH 1 TIME A WEEK 13 capsule 1    [DISCONTINUED] albuterol sulfate  (90 Base) MCG/ACT inhaler Inhale 2 puffs Every 4 (Four) Hours As Needed for Wheezing. 18 g 0     Social History     Socioeconomic History    Marital status:    Tobacco Use    Smoking status: Former     Current packs/day: 0.00     Average packs/day: 1 pack/day for 15.0 years (15.0 ttl pk-yrs)     Types: Cigarettes     Start date:      Quit date:      Years since quittin.1     Passive exposure: Past    Smokeless tobacco: Never   Vaping Use    Vaping status: Never Used   Substance and Sexual Activity    Alcohol use: Yes     Alcohol/week: 0.0 - 1.0 standard drinks of alcohol     Comment: 1 MONTHLY    Drug use: No    Sexual activity: Defer     Family History   Problem Relation Age of Onset    Lung cancer Mother     Aneurysm Father     COPD Father     No Known Problems Brother     No Known Problems Daughter     No Known Problems Sister     Malig Hyperthermia Neg Hx      Past Medical History:   Diagnosis Date    Anxiety     DDD (degenerative disc disease), lumbar     Eczema     Fatty liver disease, nonalcoholic     GERD (gastroesophageal reflux disease)     History of concussion     HLD  (hyperlipidemia)     HTN (hypertension)     IBS (irritable bowel syndrome)     SHANNAN (obstructive sleep apnea)     NO MACHINE    Osteoarthritis of right knee     Urinary incontinence in female     WEARS PADS    Urolithiasis     Vitamin D deficiency      Past Surgical History:   Procedure Laterality Date    APPENDECTOMY      CHOLECYSTECTOMY      CYSTOSCOPY W/ LASER LITHOTRIPSY  04/2014    EXTRACORPOREAL SHOCK WAVE LITHOTRIPSY (ESWL)      STENT  X2    TN ARTHRP KNE CONDYLE&PLATU MEDIAL&LAT COMPARTMENTS Left 4/18/2016    Procedure: LT TOTAL KNEE ARTHROPLASTY;  Surgeon: Cam Ryan MD;  Location: [facility] OR;  Service: Orthopedics    TONSILLECTOMY      TOTAL KNEE ARTHROPLASTY Right 8/12/2019    Procedure: RIGHT TOTAL KNEE ARTHROPLASTY;  Surgeon: Cam Ryan MD;  Location: [facility] OR;  Service: Orthopedics    URETEROTOMY  03/2014    R Stent- stone       Review of Systems   Constitutional:  Negative for appetite change, chills, diaphoresis, fatigue, fever and unexpected weight change.   Respiratory:  Positive for wheezing. Negative for cough, chest tightness and shortness of breath.    Cardiovascular:  Positive for palpitations (recently since covid). Negative for chest pain and leg swelling.   Gastrointestinal:  Positive for abdominal pain (GERD controlled with pepcid). Negative for anal bleeding, blood in stool, constipation, diarrhea, nausea ( spells), rectal pain and vomiting.   Endocrine: Negative for cold intolerance, heat intolerance and polyuria.   Genitourinary:  Negative for difficulty urinating, dysuria, flank pain, frequency, hematuria and urgency.   Musculoskeletal:  Positive for arthralgias (right knee and hands) and back pain. Negative for myalgias.   Allergic/Immunologic: Positive for environmental allergies.   Neurological:  Positive for headaches. Negative for dizziness, syncope, speech difficulty, weakness, light-headedness and numbness.   Hematological:  Does not bruise/bleed easily.    Psychiatric/Behavioral:  Positive for dysphoric mood. Negative for agitation, confusion and sleep disturbance. The patient is not nervous/anxious.        Objective   Vitals:    03/06/25 1306   BP: 120/84   Pulse: 84   Resp: 18   Temp: 96.9 °F (36.1 °C)   SpO2: 95%          03/06/25  1306   Weight: 118 kg (260 lb)    [unfilled]  Body mass index is 47.55 kg/m².      Physical Exam   Constitutional: She is oriented to person, place, and time. She appears well-developed.   HENT:   Head: Normocephalic and atraumatic.   Right Ear: Tympanic membrane and external ear normal.   Left Ear: Tympanic membrane and external ear normal.   Nose: Nose normal.   Eyes: Pupils are equal, round, and reactive to light. Conjunctivae are normal.   Neck: No JVD present. No thyromegaly present.   Cardiovascular: Normal rate, regular rhythm and normal heart sounds. Exam reveals no gallop.   No murmur heard.  Pulmonary/Chest: Effort normal and breath sounds normal. No respiratory distress. She has no wheezes. She has no rales. Right breast exhibits no mass, no skin change and no tenderness. Left breast exhibits no mass, no skin change and no tenderness.   Chaperone present   Abdominal: Soft. Normal appearance and bowel sounds are normal. She exhibits no distension and no mass. There is no abdominal tenderness. There is no guarding. No hernia.   Musculoskeletal: Normal range of motion.   Lymphadenopathy:     She has no cervical adenopathy.   Neurological: She is alert and oriented to person, place, and time. She displays normal reflexes. No cranial nerve deficit. Coordination normal.   Skin: Skin is warm and dry.   Psychiatric: Her behavior is normal. Mood, judgment and thought content normal.   Nursing note and vitals reviewed.        Problems Addressed this Visit          Cardiac and Vasculature    Hypertension (Chronic)    Hyperlipidemia (Chronic)     Other Visit Diagnoses       Encounter for preventive health examination    -  Primary     Upper respiratory tract infection, unspecified type        Relevant Medications    albuterol sulfate  (90 Base) MCG/ACT inhaler          Diagnoses         Codes Comments    Encounter for preventive health examination    -  Primary ICD-10-CM: Z00.00  ICD-9-CM: V70.0     Primary hypertension     ICD-10-CM: I10  ICD-9-CM: 401.9     Hyperlipidemia, unspecified hyperlipidemia type     ICD-10-CM: E78.5  ICD-9-CM: 272.4     Upper respiratory tract infection, unspecified type     ICD-10-CM: J06.9  ICD-9-CM: 465.9           Assessment & Plan   In for annual preventive exam today March 2025.  Also in for in for recheck of htn, lipids, IBS, and Gerd.  Has diarrhea predominant irritable bowel which has been resolved on using her dicyclomine regularly now.  Off of fiber and Imodium.  Also boosted her sertraline dose to 150 mg previously.  That is working well.  Annual labs today March 2025 including CBC, CMP, lipids, UA.  Lipids today and q 3 mos.  Depression is better now.  She is still due for mammogram.  She is 3 HPP toast today.      Prevention counseling was performed today. The counseling performed was routine health maintenance topics including BMI and exercise.    The above information was reviewed again today 03/06/25.  It continues to be accurate as reflected above and is unchanged.  History, physical and review of systems all reviewed and are unchanged.  Medications were reviewed today and continue the current dosing.         Dragon disclaimer:   Much of this encounter note is an electronic transcription/translation of spoken language to printed text. The electronic translation of spoken language may permit erroneous, or at times, nonsensical words or phrases to be inadvertently transcribed; Although I have reviewed the note for such errors, some may still exist.

## 2025-04-12 DIAGNOSIS — J06.9 UPPER RESPIRATORY TRACT INFECTION, UNSPECIFIED TYPE: ICD-10-CM

## 2025-04-14 RX ORDER — ALBUTEROL SULFATE 90 UG/1
2 INHALANT RESPIRATORY (INHALATION) EVERY 4 HOURS PRN
Qty: 6.7 G | Refills: 0 | Status: SHIPPED | OUTPATIENT
Start: 2025-04-14

## 2025-05-19 RX ORDER — EZETIMIBE 10 MG/1
10 TABLET ORAL DAILY
Qty: 90 TABLET | Refills: 1 | Status: SHIPPED | OUTPATIENT
Start: 2025-05-19

## 2025-05-19 RX ORDER — HYDROCHLOROTHIAZIDE 12.5 MG/1
12.5 CAPSULE ORAL DAILY
Qty: 90 CAPSULE | Refills: 1 | Status: SHIPPED | OUTPATIENT
Start: 2025-05-19

## 2025-05-19 RX ORDER — VALACYCLOVIR HYDROCHLORIDE 500 MG/1
500 TABLET, FILM COATED ORAL DAILY
Qty: 90 TABLET | Refills: 1 | Status: SHIPPED | OUTPATIENT
Start: 2025-05-19

## 2025-05-19 RX ORDER — LISINOPRIL 10 MG/1
10 TABLET ORAL DAILY
Qty: 90 TABLET | Refills: 1 | Status: SHIPPED | OUTPATIENT
Start: 2025-05-19

## 2025-05-19 RX ORDER — SERTRALINE HYDROCHLORIDE 100 MG/1
150 TABLET, FILM COATED ORAL DAILY
Qty: 135 TABLET | Refills: 1 | Status: SHIPPED | OUTPATIENT
Start: 2025-05-19

## 2025-06-09 ENCOUNTER — LAB (OUTPATIENT)
Dept: LAB | Facility: HOSPITAL | Age: 65
End: 2025-06-09
Payer: COMMERCIAL

## 2025-06-09 ENCOUNTER — OFFICE VISIT (OUTPATIENT)
Dept: INTERNAL MEDICINE | Facility: CLINIC | Age: 65
End: 2025-06-09
Payer: COMMERCIAL

## 2025-06-09 ENCOUNTER — RESULTS FOLLOW-UP (OUTPATIENT)
Dept: INTERNAL MEDICINE | Facility: CLINIC | Age: 65
End: 2025-06-09

## 2025-06-09 VITALS
RESPIRATION RATE: 18 BRPM | HEIGHT: 62 IN | HEART RATE: 74 BPM | OXYGEN SATURATION: 98 % | TEMPERATURE: 98.1 F | BODY MASS INDEX: 47.84 KG/M2 | SYSTOLIC BLOOD PRESSURE: 128 MMHG | WEIGHT: 260 LBS | DIASTOLIC BLOOD PRESSURE: 80 MMHG

## 2025-06-09 DIAGNOSIS — I10 PRIMARY HYPERTENSION: Primary | Chronic | ICD-10-CM

## 2025-06-09 DIAGNOSIS — E78.5 HYPERLIPIDEMIA, UNSPECIFIED HYPERLIPIDEMIA TYPE: Chronic | ICD-10-CM

## 2025-06-09 LAB
CHOLEST SERPL-MCNC: 172 MG/DL (ref 0–200)
HDLC SERPL QL: 3.91
HDLC SERPL-MCNC: 44 MG/DL (ref 40–60)
LDLC SERPL CALC-MCNC: 103 MG/DL (ref 0–100)
TRIGL SERPL-MCNC: 142 MG/DL (ref 0–150)
VLDLC SERPL-MCNC: 25 MG/DL (ref 5–40)

## 2025-06-09 PROCEDURE — 99214 OFFICE O/P EST MOD 30 MIN: CPT | Performed by: INTERNAL MEDICINE

## 2025-06-09 PROCEDURE — 80061 LIPID PANEL: CPT | Performed by: INTERNAL MEDICINE

## 2025-06-09 PROCEDURE — 36415 COLL VENOUS BLD VENIPUNCTURE: CPT | Performed by: INTERNAL MEDICINE

## 2025-06-09 NOTE — LETTER
Ann-Marie Eaton  [address] [facility] [address]    June 9, 2025     Dear Ms. Eaton:    Below are the results from your recent visit:    Resulted Orders   Lipid Panel With / Chol / HDL Ratio   Result Value Ref Range    Total Cholesterol 172 0 - 200 mg/dL    Triglycerides 142 0 - 150 mg/dL    HDL Cholesterol 44 40 - 60 mg/dL    LDL Cholesterol  103 (H) 0 - 100 mg/dL    VLDL Cholesterol 25 5 - 40 mg/dL    Chol/HDL Ratio 3.91              Your labs look good.  They are slightly improved.        If you have any questions or concerns, please don't hesitate to call.         Sincerely,        John Cowan MD

## 2025-06-09 NOTE — PROGRESS NOTES
Subjective   Ann-Marie Eaton is a 65 y.o. female.     Chief Complaint   Patient presents with    Hyperlipidemia    Irritable Bowel Syndrome         Hyperlipidemia  This is a chronic problem. The current episode started more than 1 year ago. The problem is controlled. Recent lipid tests were reviewed and are normal. Pertinent negatives include no chest pain or shortness of breath.   Irritable Bowel Syndrome  Symptoms include headaches.    Pertinent negative symptoms include no chest pain.   Hypertension  This is a chronic problem. The current episode started more than 1 year ago. The problem is unchanged. The problem is controlled. Associated symptoms include headaches and peripheral edema. Pertinent negatives include no chest pain, orthopnea, palpitations or shortness of breath.        The following portions of the patient's history were reviewed and updated as appropriate: allergies, current medications, past social history and problem list.    Outpatient Medications Marked as Taking for the 6/9/25 encounter (Office Visit) with John Cowan MD   Medication Sig Dispense Refill    albuterol sulfate  (90 Base) MCG/ACT inhaler INHALE 2 PUFFS EVERY 4 HOURS AS NEEDED FOR WHEEZING 6.7 g 0    atorvastatin (LIPITOR) 20 MG tablet TAKE 1 TABLET BY MOUTH DAILY 90 tablet 1    benzonatate (TESSALON) 200 MG capsule Take 1 capsule by mouth 3 (Three) Times a Day As Needed for Cough. 30 capsule 0    colestipol (COLESTID) 1 g tablet TAKE 1 TABLET ONCE TO TWICE DAILY PRN DIARRHEA AND STOOL URGENCY      dicyclomine (BENTYL) 20 MG tablet Take 1 tablet by mouth.      Ergocalciferol (Vitamin D2) 10 MCG (400 UNIT) tablet Take  by mouth.      ezetimibe (ZETIA) 10 MG tablet TAKE 1 TABLET BY MOUTH DAILY 90 tablet 1    famotidine (PEPCID) 20 MG tablet Take 1 tablet by mouth 2 (Two) Times a Day.      fluticasone (FLONASE) 50 MCG/ACT nasal spray 2 sprays into the nostril(s) as directed by provider Daily. 16 g 0    hydroCHLOROthiazide  (MICROZIDE) 12.5 MG capsule TAKE 1 CAPSULE BY MOUTH DAILY 90 capsule 1    lisinopril (PRINIVIL,ZESTRIL) 10 MG tablet TAKE 1 TABLET BY MOUTH DAILY 90 tablet 1    multivitamin with minerals tablet tablet Take 1 tablet by mouth Daily.      naproxen sodium (ALEVE) 220 MG tablet Take 1 tablet by mouth 2 (Two) Times a Day As Needed.      potassium chloride 10 MEQ CR tablet TAKE 1 TABLET BY MOUTH TWICE DAILY 180 tablet 1    sertraline (ZOLOFT) 100 MG tablet TAKE 1 AND 1/2 TABLETS BY MOUTH DAILY 135 tablet 1    triamcinolone (KENALOG) 0.1 % cream Apply 1 Application topically to the appropriate area as directed 3 (Three) Times a Day. 30 g 0    valACYclovir (VALTREX) 500 MG tablet TAKE 1 TABLET BY MOUTH DAILY 90 tablet 1    vitamin D (ERGOCALCIFEROL) 1.25 MG (10467 UT) capsule capsule TAKE 1 CAPSULE BY MOUTH 1 TIME A WEEK 13 capsule 1       Review of Systems   Respiratory:  Positive for wheezing. Negative for shortness of breath.    Cardiovascular:  Negative for chest pain, palpitations, orthopnea and leg swelling.   Gastrointestinal:  Negative for constipation (controlled with bentyl) and diarrhea (controlled with colestid).   Neurological:  Positive for headaches.       Objective   There were no vitals filed for this visit.     There were no vitals filed for this visit.   [unfilled]  Body mass index is 47.55 kg/m².    Physical Exam  Constitutional:       Appearance: Normal appearance. She is well-developed.   Neck:      Thyroid: No thyromegaly.   Cardiovascular:      Rate and Rhythm: Normal rate and regular rhythm.      Heart sounds: Normal heart sounds. No murmur heard.     No gallop.   Pulmonary:      Effort: Pulmonary effort is normal. No respiratory distress.      Breath sounds: Normal breath sounds. No wheezing or rales.   Abdominal:      General: Bowel sounds are normal.      Palpations: Abdomen is soft. There is no mass.      Tenderness: There is no abdominal tenderness. There is no guarding.   Neurological:       Mental Status: She is alert.                Problems Addressed this Visit          Cardiac and Vasculature    Hypertension - Primary (Chronic)    Hyperlipidemia (Chronic)     Diagnoses         Codes Comments      Primary hypertension    -  Primary ICD-10-CM: I10  ICD-9-CM: 401.9       Hyperlipidemia, unspecified hyperlipidemia type     ICD-10-CM: E78.5  ICD-9-CM: 272.4           Assessment & Plan   In for recheck of hypertension, hyperlipidemia, IBS and GERD today June 2025.  Has diarrhea predominant irritable bowel which is doing pretty well overall.  Currently controlled with Colestid.  Off of Metamucil.  Off of probiotics.  Nan is controlling her constipation.  Annual labs March 2025 including CBC, CMP, lipids, UA.  Lipids q 3 mos. Sertraline dose is back to 150 mg daily.  Depression is better now.  Remains on Naprosyn 225 mg daily for arthritis.  Remains on Zetia 10 mg daily and Lipitor 20 mg daily for hyperlipidemia and those are reviewed today.  Annual preventive exam January 2025.  Plans to retire in August 2025.  She is fasting today.    The above information was reviewed again today 06/09/25.  It continues to be accurate as reflected above and is unchanged.  History, physical and review of systems all reviewed and are unchanged.  Medications were reviewed today and continue the current dosing.         Dragon disclaimer:   Much of this encounter note is an electronic transcription/translation of spoken language to printed text. The electronic translation of spoken language may permit erroneous, or at times, nonsensical words or phrases to be inadvertently transcribed; Although I have reviewed the note for such errors, some may still exist.

## 2025-07-22 RX ORDER — POTASSIUM CHLORIDE 750 MG/1
10 TABLET, EXTENDED RELEASE ORAL 2 TIMES DAILY
Qty: 180 TABLET | Refills: 1 | Status: SHIPPED | OUTPATIENT
Start: 2025-07-22

## 2025-08-18 RX ORDER — ERGOCALCIFEROL 1.25 MG/1
50000 CAPSULE, LIQUID FILLED ORAL WEEKLY
Qty: 13 CAPSULE | Refills: 1 | Status: SHIPPED | OUTPATIENT
Start: 2025-08-18

## (undated) DEVICE — BANDAGE,GAUZE,BULKEE II,4.5"X4.1YD,STRL: Brand: MEDLINE

## (undated) DEVICE — ANTIBACTERIAL UNDYED BRAIDED (POLYGLACTIN 910), SYNTHETIC ABSORBABLE SUTURE: Brand: COATED VICRYL

## (undated) DEVICE — KT DRN EVAC WND PVC PCH WTROC RND 10F400

## (undated) DEVICE — 2108 SERIES SAGITTAL BLADE, NO OFFSET  (12.4 X 1.19 X 82.1MM)

## (undated) DEVICE — PK KN TOTL 40

## (undated) DEVICE — STPLR SKIN VISISTAT WD 35CT

## (undated) DEVICE — GLV SURG PREMIERPRO ORTHO LTX PF SZ9 BRN

## (undated) DEVICE — APPL DURAPREP IODOPHOR APL 26ML

## (undated) DEVICE — SYR LUERLOK 20CC BX/50

## (undated) DEVICE — GLV SURG TRIUMPH CLASSIC PF LTX 8.5 STRL

## (undated) DEVICE — GLV SURG SENSICARE W/ALOE PF LF 9 STRL

## (undated) DEVICE — SOL NACL 0.9PCT 1000ML

## (undated) DEVICE — DUAL CUT SAGITTAL BLADE

## (undated) DEVICE — ZIP 24 SURGICAL SKIN CLOSURE DEVICE, PSA: Brand: ZIP 24 SURGICAL SKIN CLOSURE DEVICE

## (undated) DEVICE — DRSNG WND GZ CURAD OIL EMULSION 3X8IN LF STRL 1PK

## (undated) DEVICE — NDL SPINE 20G 3 1/2 YEL STRL 1P/U

## (undated) DEVICE — BNDG ELAS ELITE V/CLOSE 6IN 5YD LF STRL

## (undated) DEVICE — UNDERCAST PADDING: Brand: DEROYAL

## (undated) DEVICE — GLV SURG PREMIERPRO ORTHO LTX PF SZ8.5 BRN

## (undated) DEVICE — GAUZE,SPONGE,FLUFF,6"X6.75",STRL,10/TRAY: Brand: MEDLINE